# Patient Record
Sex: MALE | Race: WHITE | Employment: OTHER | URBAN - METROPOLITAN AREA
[De-identification: names, ages, dates, MRNs, and addresses within clinical notes are randomized per-mention and may not be internally consistent; named-entity substitution may affect disease eponyms.]

---

## 2017-01-23 ENCOUNTER — HOSPITAL ENCOUNTER (OUTPATIENT)
Dept: GENERAL RADIOLOGY | Age: 77
Discharge: HOME | End: 2017-01-23
Payer: MEDICARE

## 2017-01-23 DIAGNOSIS — Z90.5 S/P NEPHRECTOMY: ICD-10-CM

## 2017-01-23 PROCEDURE — 71020 XR CHEST PA LAT: CPT

## 2017-09-22 ENCOUNTER — CLINICAL SUPPORT (OUTPATIENT)
Dept: INTERNAL MEDICINE CLINIC | Age: 77
End: 2017-09-22

## 2017-09-22 DIAGNOSIS — Z23 ENCOUNTER FOR IMMUNIZATION: Primary | ICD-10-CM

## 2017-09-22 NOTE — PROGRESS NOTES
After obtaining consent, and per orders of Dr. Analy Burroughs, injection of Fluzone high dose 65+ given by [facility], LPN.  P

## 2017-10-03 ENCOUNTER — OFFICE VISIT (OUTPATIENT)
Dept: INTERNAL MEDICINE CLINIC | Age: 77
End: 2017-10-03

## 2017-10-03 VITALS
WEIGHT: 178 LBS | SYSTOLIC BLOOD PRESSURE: 120 MMHG | HEIGHT: 73 IN | BODY MASS INDEX: 23.59 KG/M2 | DIASTOLIC BLOOD PRESSURE: 72 MMHG | HEART RATE: 81 BPM | OXYGEN SATURATION: 97 %

## 2017-10-03 DIAGNOSIS — M79.642 PAIN OF LEFT HAND: Primary | ICD-10-CM

## 2017-10-03 PROBLEM — K21.9 GASTROESOPHAGEAL REFLUX DISEASE WITHOUT ESOPHAGITIS: Status: ACTIVE | Noted: 2017-10-03

## 2017-10-03 PROBLEM — E78.00 HYPERCHOLESTEROLEMIA: Status: ACTIVE | Noted: 2017-10-03

## 2017-10-03 PROBLEM — J30.9 ALLERGIC RHINITIS: Status: ACTIVE | Noted: 2017-10-03

## 2017-10-03 PROBLEM — J31.0 PURULENT RHINITIS: Status: ACTIVE | Noted: 2017-10-03

## 2017-10-03 PROBLEM — S33.5XXA LUMBAR SPRAIN: Status: ACTIVE | Noted: 2017-10-03

## 2017-10-03 PROBLEM — B02.29 HERPES ZOSTER VIRUS INFECTION OF FACE AND EAR NERVES: Status: ACTIVE | Noted: 2017-10-03

## 2017-10-03 PROBLEM — Z79.899 LONG-TERM USE OF HIGH-RISK MEDICATION: Status: ACTIVE | Noted: 2017-10-03

## 2017-10-03 PROBLEM — K29.80 DUODENITIS: Status: ACTIVE | Noted: 2017-10-03

## 2017-10-03 PROBLEM — C64.9 RENAL CELL CANCER (HCC): Status: ACTIVE | Noted: 2017-10-03

## 2017-10-03 PROBLEM — E55.9 VITAMIN D DEFICIENCY: Status: ACTIVE | Noted: 2017-10-03

## 2017-10-03 PROBLEM — N52.9 ED (ERECTILE DYSFUNCTION) OF ORGANIC ORIGIN: Status: ACTIVE | Noted: 2017-10-03

## 2017-10-03 PROBLEM — I82.409 RECURRENT DEEP VEIN THROMBOSIS (DVT) (HCC): Status: ACTIVE | Noted: 2017-10-03

## 2017-10-03 PROBLEM — C61 PROSTATIC CANCER (HCC): Status: ACTIVE | Noted: 2017-10-03

## 2017-10-03 PROBLEM — K63.5 COLON POLYPS: Status: ACTIVE | Noted: 2017-10-03

## 2017-10-03 PROBLEM — L30.9 ECZEMA: Status: ACTIVE | Noted: 2017-10-03

## 2017-10-03 PROBLEM — D68.51 FACTOR V LEIDEN MUTATION (HCC): Status: ACTIVE | Noted: 2017-10-03

## 2017-10-03 PROBLEM — M85.80 OSTEOPENIA: Status: ACTIVE | Noted: 2017-10-03

## 2017-10-03 PROBLEM — Z79.01 LONG-TERM (CURRENT) USE OF ANTICOAGULANTS: Status: ACTIVE | Noted: 2017-10-03

## 2017-10-03 PROBLEM — S92.912A TOE FRACTURE, LEFT: Status: ACTIVE | Noted: 2017-10-03

## 2017-10-03 RX ORDER — SILDENAFIL CITRATE 20 MG/1
20 TABLET ORAL AS NEEDED
COMMUNITY
End: 2020-05-01 | Stop reason: ALTCHOICE

## 2017-10-03 RX ORDER — DICLOFENAC SODIUM 10 MG/G
GEL TOPICAL 4 TIMES DAILY
COMMUNITY
End: 2018-08-02 | Stop reason: ALTCHOICE

## 2017-10-03 RX ORDER — CLOBETASOL PROPIONATE 0.5 MG/G
CREAM TOPICAL 2 TIMES DAILY
Qty: 15 G | Refills: 0 | Status: SHIPPED | OUTPATIENT
Start: 2017-10-03 | End: 2018-08-02 | Stop reason: ALTCHOICE

## 2017-10-03 NOTE — PATIENT INSTRUCTIONS
Hand Pain: Care Instructions  Your Care Instructions  Common causes of hand pain are overuse and injuries, such as might happen during sports or home repair projects. Everyday wear and tear, especially as you get older, also can cause hand pain. Most minor hand injuries will heal on their own, and home treatment is usually all you need to do. If you have sudden and severe pain, you may need tests and treatment. Follow-up care is a key part of your treatment and safety. Be sure to make and go to all appointments, and call your doctor if you are having problems. Its also a good idea to know your test results and keep a list of the medicines you take. How can you care for yourself at home? · Take pain medicines exactly as directed. ¨ If the doctor gave you a prescription medicine for pain, take it as prescribed. ¨ If you are not taking a prescription pain medicine, ask your doctor if you can take an over-the-counter medicine. · Rest and protect your hand. Take a break from any activity that may cause pain. · Put ice or a cold pack on your hand for 10 to 20 minutes at a time. Put a thin cloth between the ice and your skin. · Prop up the sore hand on a pillow when you ice it or anytime you sit or lie down during the next 3 days. Try to keep it above the level of your heart. This will help reduce swelling. · If your doctor recommends a sling, splint, or elastic bandage to support your hand, wear it as directed. When should you call for help? Call 911 anytime you think you may need emergency care. For example, call if:  · Your hand turns cool or pale or changes color. Call your doctor now or seek immediate medical care if:  · You cannot move your hand. · Your hand pops, moves out of its normal position, and then returns to its normal position. · You have signs of infection, such as:  ¨ Increased pain, swelling, warmth, or redness. ¨ Red streaks leading from the sore area.   ¨ Pus draining from a place on your hand. ¨ A fever. · Your hand feels numb or tingly. Watch closely for changes in your health, and be sure to contact your doctor if:  · Your hand feels unstable when you try to use it. · You do not get better as expected. · You have any new symptoms, such as swelling. · Bruises from an injury to your hand last longer than 2 weeks. Where can you learn more? Go to http://kenna-lisa.info/. Enter R273 in the search box to learn more about \"Hand Pain: Care Instructions. \"  Current as of: March 20, 2017  Content Version: 11.3  © 4171-7534 [employer]. Care instructions adapted under license by [employer] (which disclaims liability or warranty for this information). If you have questions about a medical condition or this instruction, always ask your healthcare professional. [address] any warranty or liability for your use of this information.

## 2017-10-03 NOTE — MR AVS SNAPSHOT
Visit Information Date & Time Provider Department Dept. Phone Encounter #  
 10/3/2017  1:50 PM Ana Lilia Orellana MD [facility] [id number] Follow-up Instructions Return if symptoms worsen or fail to improve. Your Appointments 1/4/2018 10:00 AM  
FOLLOW UP 10 with Ana Lilia Orellana MD  
Corazon Prieto ([facility]) Appt Note: 6 mo fu  
 [address] [id number] [address] [facility] [id number] Upcoming Health Maintenance Date Due DTaP/Tdap/Td series (1 - Tdap) 9/5/1961 ZOSTER VACCINE AGE 60> 7/5/2000 GLAUCOMA SCREENING Q2Y 9/5/2005 Pneumococcal 65+ High/Highest Risk (1 of 2 - PCV13) 9/5/2005 MEDICARE YEARLY EXAM 9/5/2005 Allergies as of 10/3/2017  Review Complete On: 10/3/2017 By: Ana Lilia Orellana MD  
  
 Severity Noted Reaction Type Reactions Quinolones  10/03/2017    Unknown (comments) Current Immunizations  Never Reviewed Name Date Influenza High Dose Vaccine PF 9/22/2017 Influenza Vaccine 10/26/2016, 10/19/2015, 9/25/2014 Pneumococcal Conjugate (PCV-13) 10/19/2015, 12/1/2005 Not reviewed this visit You Were Diagnosed With   
  
 Codes Comments Pain of left hand    -  Primary ICD-10-CM: F58.868 ICD-9-CM: 729.5 Vitals BP Pulse Height(growth percentile) Weight(growth percentile) SpO2 BMI  
 120/72 (BP 1 Location: Right arm, BP Patient Position: Sitting) 81 6' 1\" (1.854 m) 178 lb (80.7 kg) 97% 23.48 kg/m2 Smoking Status Never Smoker BMI and BSA Data Body Mass Index Body Surface Area  
 23.48 kg/m 2 2.04 m 2 Preferred Pharmacy Pharmacy Name Phone [facility] [address], [address] [place] [address] [phone] Your Updated Medication List  
  
   
 This list is accurate as of: 10/3/17  2:02 PM.  Always use your most recent med list.  
  
  
  
  
 BONIVA 150 mg tablet Generic drug:  ibandronate Take 150 mg by mouth every thirty (30) days. CALCIUM 600 + D 600-125 mg-unit Tab Generic drug:  calcium-cholecalciferol (d3) Take 1 tablet by mouth daily. calcium carbonate 200 mg calcium (500 mg) Chew Commonly known as:  TUMS Take 2 tablets by mouth daily. Cholecalciferol (Vitamin D3) 3,000 unit Tab Take 1 tablet by mouth daily. clobetasol 0.05 % topical cream  
Commonly known as:  Deidra Patee Apply  to affected area two (2) times a day. diphenhydrAMINE 25 mg capsule Commonly known as:  BENADRYL Take 25 mg by mouth every six (6) hours as needed for Itching or Sleep (allergies). pravastatin 10 mg tablet Commonly known as:  PRAVACHOL Take 40 mg by mouth nightly. Indications: HYPERCHOLESTEROLEMIA  
  
 sildenafil 20 mg tablet Commonly known as:  REVATIO Take 20 mg by mouth three (3) times daily. TYLENOL EXTRA STRENGTH 500 mg tablet Generic drug:  acetaminophen Take 1,000 mg by mouth every six (6) hours as needed for Pain. VOLTAREN 1 % Gel Generic drug:  diclofenac Apply  to affected area four (4) times daily. XARELTO 20 mg Tab tablet Generic drug:  rivaroxaban Take 20 mg by mouth daily. Prescriptions Sent to Pharmacy Refills  
 clobetasol (TEMOVATE) 0.05 % topical cream 0 Sig: Apply  to affected area two (2) times a day. Class: Normal  
 Pharmacy: [address], [address] [place] [address]  [address]  #: [phone] Route: Topical  
  
Follow-up Instructions Return if symptoms worsen or fail to improve. Introducing [facility]! Romayne Duster introduces [employer] patient portal. Now you can access parts of your medical record, email your doctor's office, and request medication refills online. 1. In your internet browser, go to [email or web address] 2. Click on the First Time User? Click Here link in the Sign In box. You will see the New Member Sign Up page. 3. Enter your [employer] Access Code exactly as it appears below. You will not need to use this code after youve completed the sign-up process. If you do not sign up before the expiration date, you must request a new code. · [employer] Access Code: [email or web address] Expires: 12/21/2017  9:19 AM 
 
4. Enter the last four digits of your Social Security Number (xxxx) and Date of Birth (mm/dd/yyyy) as indicated and click Submit. You will be taken to the next sign-up page. 5. Create a Teliportmet ID. This will be your [employer] login ID and cannot be changed, so think of one that is secure and easy to remember. 6. Create a [employer] password. You can change your password at any time. 7. Enter your Password Reset Question and Answer. This can be used at a later time if you forget your password. 8. Enter your e-mail address. You will receive e-mail notification when new information is available in [address] 9. Click Sign Up. You can now view and download portions of your medical record. 10. Click the Download Summary menu link to download a portable copy of your medical information. If you have questions, please visit the Frequently Asked Questions section of the [employer] website. Remember, [employer] is NOT to be used for urgent needs. For medical emergencies, dial 911. Now available from your iPhone and Android! Please provide this summary of care documentation to your next provider. Your primary care clinician is listed as [address]. If you have any questions after today's visit, please call [phone].

## 2017-10-03 NOTE — PROGRESS NOTES
This note will not be viewable in [address] Subjective:   Mr. Jonnie Ruiz presents to the office today with complaints of right hand pain and swelling. He has noticed this over the last week after he had playing a round of golf. Patient states that he feels as though he was gripping the golf club to tightly. Since then he has noted the palm of his hand swelling and a round well circumcised rash present. He notes that it is slightly itchy.   He denies any hand pain or wrist pain or no reduction in the range of motion of his  or wrist movement      Past Medical History:   Diagnosis Date    Allergic rhinitis 10/3/2017    Arthritis     Cancer ([address]     Prostate    Cancer ([address]     kidney    Chronic kidney disease     Coagulation disorder ([address]     factor V leiden mutation- tends to have blood clots-     Colon polyps 10/3/2017    Duodenitis 10/3/2017    Eczema 10/3/2017    ED (erectile dysfunction) of organic origin 10/3/2017    Factor V Leiden mutation ([address] 10/3/2017    Gastroesophageal reflux disease without esophagitis 10/3/2017    Herpes zoster virus infection of face and ear nerves 10/3/2017    Hypercholesterolemia 10/3/2017    Long-term (current) use of anticoagulants 10/3/2017    Long-term use of high-risk medication 10/3/2017    Lumbar sprain 10/3/2017    Osteopenia 10/3/2017    Other ill-defined conditions(799.89)     High Cholesterol    Other ill-defined conditions(799.89)     doctor does not want him to have quinolones family -causes achilles tendonitis    Prostatic cancer ([address] 10/3/2017    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/2017    Recurrent deep vein thrombosis (DVT) ([address] 10/3/2017    Renal cell cancer ([address] 10/3/2017    Thromboembolus ([address]     DVT    Toe fracture, left 10/3/2017    Vitamin D deficiency 10/3/2017     Past Surgical History:   Procedure Laterality Date    COLORECTAL SCRN; HI RISK IND  2/3/2015         HX ORTHOPAEDIC      cortisone shot in knee and elbow    HX OTHER SURGICAL      colonoscopy    HX TONSILLECTOMY      HX UROLOGICAL Right     Kidney Removal     Allergies   Allergen Reactions    Quinolones Unknown (comments)     Current Outpatient Prescriptions   Medication Sig Dispense Refill    sildenafil (REVATIO) 20 mg tablet Take 20 mg by mouth three (3) times daily.  diclofenac (VOLTAREN) 1 % gel Apply  to affected area four (4) times daily.  clobetasol (TEMOVATE) 0.05 % topical cream Apply  to affected area two (2) times a day. 15 g 0    Cholecalciferol, Vitamin D3, 3,000 unit tab Take 1 tablet by mouth daily.  calcium carbonate (TUMS) 200 mg calcium (500 mg) chew Take 2 tablets by mouth daily.  calcium-cholecalciferol, d3, (CALCIUM 600 + D) 600-125 mg-unit tab Take 1 tablet by mouth daily.  ibandronate (BONIVA) 150 mg tablet Take 150 mg by mouth every thirty (30) days.  acetaminophen (TYLENOL EXTRA STRENGTH) 500 mg tablet Take 1,000 mg by mouth every six (6) hours as needed for Pain.  diphenhydrAMINE (BENADRYL) 25 mg capsule Take 25 mg by mouth every six (6) hours as needed for Itching or Sleep (allergies).  rivaroxaban (XARELTO) 20 mg tab tablet Take 20 mg by mouth daily.  pravastatin (PRAVACHOL) 10 mg tablet Take 40 mg by mouth nightly.  Indications: HYPERCHOLESTEROLEMIA       Social History     Social History    Marital status:      Spouse name: N/A    Number of children: N/A    Years of education: N/A     Social History Main Topics    Smoking status: Never Smoker    Smokeless tobacco: Never Used    Alcohol use No    Drug use: No    Sexual activity: Not Asked     Other Topics Concern    None     Social History Narrative     Family History   Problem Relation Age of Onset    Hypertension Mother     Other Mother      abdominal aneurysm    Cancer Father      bladder        Review of Systems:  GEN: no weight loss, weight gain, fatigue or night sweats  EXT: Complains of right hand pain and redness  Neurological ROS: no TIA or stroke symptoms  ROS otherwise negative      Objective:     Visit Vitals    /72 (BP 1 Location: Right arm, BP Patient Position: Sitting)    Pulse 81    Ht 6' 1\" (1.854 m)    Wt 178 lb (80.7 kg)    SpO2 97%    BMI 23.48 kg/m2     Body mass index is 23.48 kg/(m^2). General:   alert, cooperative and no distress   Extremities: , Right hand is slightly swollen there is a round area of redness in the palm of the hand which approximates his hand as it would  his golf club which is red with well demarcated edge. No blister formation is noted. There is no tenderness to the hand with palpation no dorsal hand swelling is present and no wrist pain or swelling is evident   Neuro: ..alert, oriented x3,speech normal in context and clarity, cranial nerves II-XII intact,motor strength: full proximally and distally,gait: normal  reflexes: full and symmetric     Physical exam otherwise negative         Assessment/Plan:     Diagnoses and all orders for this visit:    Pain of left hand    Other orders  -     clobetasol (TEMOVATE) 0.05 % topical cream; Apply  to affected area two (2) times a day., Normal, Disp-15 g, R-0        Other instructions: The area of redness on the palm of the hand is somewhat interesting as it appears as either an allergic reaction or a burn. Patient notes that it is slightly itchy and we will use clobetasol cream to see if this will affect some improvement. The patient will avoid golf for the time being and was recommended that he loosen his golf  when planned in the future. Follow-up if there is no improvement    Follow-up Disposition:  Return if symptoms worsen or fail to improve.     Sam Cruz MD

## 2017-10-03 NOTE — PROGRESS NOTES
Liat Millan is a 68 y.o. male presenting for Hand Swelling  . 1. Have you been to the ER, urgent care clinic since your last visit? Hospitalized since your last visit? No    2. Have you seen or consulted any other health care providers outside of the [address] [place] [address] since your last visit? Include any pap smears or colon screening. No    Fall Risk Assessment, last 12 mths 10/3/2017   Able to walk? Yes   Fall in past 12 months? No         Abuse Screening Questionnaire 10/3/2017   Do you ever feel afraid of your partner? N   Are you in a relationship with someone who physically or mentally threatens you? N   Is it safe for you to go home? Y       PHQ over the last two weeks 10/3/2017   Little interest or pleasure in doing things Not at all   Feeling down, depressed or hopeless Not at all   Total Score PHQ 2 0       There are no discontinued medications.

## 2018-01-16 RX ORDER — RIVAROXABAN 20 MG/1
TABLET, FILM COATED ORAL
Qty: 30 TAB | Refills: 5 | Status: SHIPPED | OUTPATIENT
Start: 2018-01-16 | End: 2018-08-07 | Stop reason: SDUPTHER

## 2018-01-22 ENCOUNTER — OFFICE VISIT (OUTPATIENT)
Dept: INTERNAL MEDICINE CLINIC | Age: 78
End: 2018-01-22

## 2018-01-22 VITALS
WEIGHT: 181.6 LBS | BODY MASS INDEX: 24.07 KG/M2 | HEART RATE: 70 BPM | OXYGEN SATURATION: 96 % | DIASTOLIC BLOOD PRESSURE: 72 MMHG | HEIGHT: 73 IN | SYSTOLIC BLOOD PRESSURE: 120 MMHG

## 2018-01-22 DIAGNOSIS — C61 PROSTATIC CANCER (HCC): ICD-10-CM

## 2018-01-22 DIAGNOSIS — D68.51 FACTOR V LEIDEN MUTATION (HCC): ICD-10-CM

## 2018-01-22 DIAGNOSIS — E78.00 HYPERCHOLESTEROLEMIA: Primary | ICD-10-CM

## 2018-01-22 DIAGNOSIS — E55.9 VITAMIN D DEFICIENCY: ICD-10-CM

## 2018-01-22 DIAGNOSIS — M85.80 OSTEOPENIA, UNSPECIFIED LOCATION: ICD-10-CM

## 2018-01-22 DIAGNOSIS — Z79.899 LONG-TERM USE OF HIGH-RISK MEDICATION: ICD-10-CM

## 2018-01-22 DIAGNOSIS — Z79.01 LONG-TERM (CURRENT) USE OF ANTICOAGULANTS: ICD-10-CM

## 2018-01-22 DIAGNOSIS — C64.9 RENAL CELL CARCINOMA, UNSPECIFIED LATERALITY (HCC): ICD-10-CM

## 2018-01-22 LAB
ALBUMIN SERPL-MCNC: 4.4 G/DL (ref 3.9–5.4)
ALKALINE PHOS POC: 53 U/L (ref 38–126)
ALT SERPL-CCNC: 22 U/L (ref 9–52)
AST SERPL-CCNC: 22 U/L (ref 14–36)
BACTERIA UA POCT, BACTPOCT: NORMAL
BILIRUB UR QL STRIP: NEGATIVE
BUN BLD-MCNC: 20 MG/DL (ref 9–20)
CALCIUM BLD-MCNC: 10 MG/DL (ref 8.4–10.2)
CASTS UA POCT: 0
CHLORIDE BLD-SCNC: 101 MMOL/L (ref 98–107)
CHOLEST SERPL-MCNC: 156 MG/DL (ref 0–200)
CK (CPK) POC: 83 U/L (ref 30–135)
CLUE CELLS, CLUEPOCT: NEGATIVE
CO2 POC: 31 MMOL/L (ref 22–32)
CREAT BLD-MCNC: 1 MG/DL (ref 0.8–1.5)
CRYSTALS UA POCT, CRYSPOCT: NEGATIVE
EGFR (POC): 72.3
EPITHELIAL CELLS POCT: NEGATIVE
GLUCOSE POC: 75 MG/DL (ref 75–110)
GLUCOSE UR-MCNC: NEGATIVE MG/DL
GRAN# POC: 3.6 K/UL (ref 2–7.8)
GRAN% POC: 64 % (ref 37–92)
HCT VFR BLD CALC: 45.1 % (ref 37–51)
HDLC SERPL-MCNC: 68 MG/DL (ref 35–130)
HGB BLD-MCNC: 14.6 G/DL (ref 12–18)
KETONES P FAST UR STRIP-MCNC: NEGATIVE MG/DL
LDL CHOLESTEROL POC: 65 MG/DL (ref 0–130)
LY# POC: 1.7 K/UL (ref 0.6–4.1)
LY% POC: 30.3 % (ref 10–58.5)
MCH RBC QN: 28.7 PG (ref 26–32)
MCHC RBC-ENTMCNC: 32.5 G/DL (ref 30–36)
MCV RBC: 88 FL (ref 80–97)
MID #, POC: 0.3 K/UL (ref 0–1.8)
MID% POC: 5.7 % (ref 0.1–24)
MUCUS UA POCT, MUCPOCT: NORMAL
PH UR STRIP: 6.5 [PH] (ref 5–7)
PLATELET # BLD: 149 K/UL (ref 140–440)
POTASSIUM SERPL-SCNC: 4.5 MMOL/L (ref 3.6–5)
PROT SERPL-MCNC: 7.2 G/DL (ref 6.3–8.2)
PROT UR QL STRIP: NEGATIVE
RBC # BLD: 5.1 M/UL (ref 4.2–6.3)
RBC UA POCT, RBCPOCT: 0
SODIUM SERPL-SCNC: 143 MMOL/L (ref 137–145)
SP GR UR STRIP: 1.01 (ref 1.01–1.02)
TCHOL/HDL RATIO (POC): 2.3 (ref 0–4)
TOTAL BILIRUBIN POC: 1 MG/DL (ref 0.2–1.3)
TRICH UA POCT, TRICHPOC: NEGATIVE
TRIGL SERPL-MCNC: 115 MG/DL (ref 0–200)
TSH BLD-ACNC: 2.1 UIU/ML (ref 0.4–4.2)
UA UROBILINOGEN AMB POC: NORMAL (ref 0.2–1)
URINALYSIS CLARITY POC: CLEAR
URINALYSIS COLOR POC: NORMAL
URINE BLOOD POC: NEGATIVE
URINE CULT COMMENT, POCT: NORMAL
URINE LEUKOCYTES POC: NEGATIVE
URINE NITRITES POC: NEGATIVE
VITAMIN D POC: 28.4 NG/ML (ref 30–96)
VLDLC SERPL CALC-MCNC: 23 MG/DL
WBC # BLD: 5.6 K/UL (ref 4.1–10.9)
WBC UA POCT, WBCPOCT: 0
YEAST UA POCT, YEASTPOC: NEGATIVE

## 2018-01-22 NOTE — PROGRESS NOTES
Tamara Worley is a 68 y.o. male presenting for Cholesterol Problem (6 mo fu)  . 1. Have you been to the ER, urgent care clinic since your last visit? Hospitalized since your last visit? No    2. Have you seen or consulted any other health care providers outside of the [address] [place] [address] since your last visit? Include any pap smears or colon screening. No    Fall Risk Assessment, last 12 mths 10/3/2017   Able to walk? Yes   Fall in past 12 months? No         Abuse Screening Questionnaire 10/3/2017   Do you ever feel afraid of your partner? N   Are you in a relationship with someone who physically or mentally threatens you? N   Is it safe for you to go home? Y       PHQ over the last two weeks 10/3/2017   Little interest or pleasure in doing things Not at all   Feeling down, depressed or hopeless Not at all   Total Score PHQ 2 0       There are no discontinued medications.

## 2018-01-22 NOTE — PROGRESS NOTES
This note will not be viewable in [address] Jaimee Adams is a 68 y.o. male and presents with Cholesterol Problem (6 mo fu)  . Subjective:  Mr. Omar Beckman returns to our office today in follow-up of multiple medical problems. The patient has hypercholesterolemia and remains on pravastatin therapy. The patient denies muscle soreness or GI upset. He has no history of ASCVD and denies exertional chest pains or claudication. The  the patient has factor V Leiden deficiency with recurrent DVT. He is currently on Xarelto long-term. Patient has had no bleeding problems and no recurrent clot issues. He is tolerated his treatment well. Patient has osteopenia and remains on Boniva as well as calcium and vitamin D. He has been vitamin D deficient in the past as well. He denies any esophageal problems related to his Boniva and he has had no falls or fractures in the last 6 months. The patient does have a history of prostate cancer and renal cell cancer. He is followed by Dr. Noemi Brown for this and sees him on a yearly basis. He has had no recurrence of his disease.     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    Factor V Leiden mutation ([facility] 10/3/2017    Gastroesophageal reflux disease without esophagitis 10/3/2017    Herpes zoster virus infection of face and ear nerves 10/3/2017    Hypercholesterolemia 10/3/2017    Long-term (current) use of anticoagulants 10/3/2017    Long-term use of high-risk medication 10/3/2017    Lumbar sprain 10/3/2017    Osteopenia 10/3/2017    Other ill-defined conditions(799.89)     High Cholesterol    Other ill-defined conditions(799.89)     doctor does not want him to have quinolones family -causes achilles tendonitis    Prostatic cancer ([facility] 10/3/2017    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/2017    Recurrent deep vein thrombosis (DVT) [facility] 10/3/2017    Renal cell cancer ([facility] 10/3/2017    Thromboembolus ([facility]     DVT    Toe fracture, left 10/3/2017    Vitamin D deficiency 10/3/2017     Past Surgical History:   Procedure Laterality Date    COLORECTAL SCRN; HI RISK IND  2/3/2015         HX ORTHOPAEDIC      cortisone shot in knee and elbow    HX OTHER SURGICAL      colonoscopy    HX TONSILLECTOMY      HX UROLOGICAL Right     Kidney Removal     Allergies   Allergen Reactions    Quinolones Unknown (comments)     Current Outpatient Prescriptions   Medication Sig Dispense Refill    XARELTO 20 mg tab tablet TAKE ONE TABLET BY MOUTH DAILY 30 Tab 5    sildenafil (REVATIO) 20 mg tablet Take 20 mg by mouth three (3) times daily.  diclofenac (VOLTAREN) 1 % gel Apply  to affected area four (4) times daily.  clobetasol (TEMOVATE) 0.05 % topical cream Apply  to affected area two (2) times a day. 15 g 0    Cholecalciferol, Vitamin D3, 3,000 unit tab Take 1 tablet by mouth daily.  calcium carbonate (TUMS) 200 mg calcium (500 mg) chew Take 2 tablets by mouth daily.  calcium-cholecalciferol, d3, (CALCIUM 600 + D) 600-125 mg-unit tab Take 1 tablet by mouth daily.  ibandronate (BONIVA) 150 mg tablet Take 150 mg by mouth every thirty (30) days.  acetaminophen (TYLENOL EXTRA STRENGTH) 500 mg tablet Take 1,000 mg by mouth every six (6) hours as needed for Pain.  diphenhydrAMINE (BENADRYL) 25 mg capsule Take 25 mg by mouth every six (6) hours as needed for Itching or Sleep (allergies).  pravastatin (PRAVACHOL) 10 mg tablet Take 40 mg by mouth nightly.  Indications: HYPERCHOLESTEROLEMIA       Social History     Social History    Marital status:      Spouse name: N/A    Number of children: N/A    Years of education: N/A     Social History Main Topics    Smoking status: Never Smoker    Smokeless tobacco: Never Used    Alcohol use No    Drug use: No    Sexual activity: Not Asked     Other Topics Concern    None     Social History Narrative     Family History   Problem Relation Age of Onset    Hypertension Mother     Other Mother      abdominal aneurysm    Cancer Father      bladder        Health Maintenance   Topic Date Due    DTaP/Tdap/Td series (1 - Tdap) 09/05/1961    ZOSTER VACCINE AGE 60>  07/05/2000    GLAUCOMA SCREENING Q2Y  09/05/2005    MEDICARE YEARLY EXAM  09/05/2005    Pneumococcal 65+ High/Highest Risk (2 of 2 - PPSV23) 12/14/2015    Influenza Age 9 to Adult  Completed        Review of Systems  Constitutional: negative for fevers, chills, anorexia and weight loss  Eyes:   negative for visual disturbance and irritation  ENT:   negative for tinnitus,sore throat,nasal congestion,ear pain,hoarseness  Respiratory:  negative for cough, hemoptysis, dyspnea,wheezing  CV:   negative for chest pain, palpitations, lower extremity edema  GI:   negative for nausea, vomiting, diarrhea, abdominal pain,melena  Endo:               negative for polyuria,polydipsia,polyphagia,heat intolerance  Genitourinary: negative for frequency, dysuria and hematuria  Integumentary: negative for rash and pruritus  Hematologic:  negative for easy bruising and gum/nose bleeding  Musculoskel: negative for myalgias, arthralgias, back pain, muscle weakness, joint pain  Neurological:  negative for headaches, dizziness, vertigo, memory problems and gait   Behavl/Psych: negative for feelings of anxiety, depression, mood changes  ROS otherwise negative      Objective:  Visit Vitals    /72 (BP 1 Location: Left arm, BP Patient Position: Sitting)    Pulse 70    Ht 6' 1\" (1.854 m)    Wt 181 lb 9.6 oz (82.4 kg)    SpO2 96%    BMI 23.96 kg/m2     Body mass index is 23.96 kg/(m^2).     Physical Exam:   General appearance - alert, well appearing, and in no distress  Mental status - alert, oriented to person, place, and time  EYE-BLAS, EOMI,conjunctiva normal bilaterally, lids normal  ENT-ENT exam normal, no neck nodes or sinus tenderness  Nose - normal and patent, no erythema,  Or discharge   Mouth - mucous membranes moist, pharynx normal without lesions  Neck - supple, no significant adenopathy or bruit  Chest - clear to auscultation, no wheezes, rales or rhonchi. Heart - normal rate, regular rhythm, normal S1, S2, no murmurs, rubs, clicks or gallops   Abdomen - soft, nontender, nondistended, no masses or organomegaly  Lymph- no adenopathy palpable  Ext-peripheral pulses normal, no pedal edema, no clubbing or cyanosis  Skin-Warm and dry. no hyperpigmentation, vitiligo, or suspicious lesions  Neuro -alert, oriented, normal speech, no focal findings or movement disorder noted      Assessment/Plan:  Diagnoses and all orders for this visit:    Hypercholesterolemia  -     AMB POC LIPID PROFILE  -     IN COLLECTION VENOUS BLOOD,VENIPUNCTURE  -     AMB POC TSH    Long-term use of high-risk medication  -     AMB POC COMPLETE CBC,AUTOMATED ENTER  -     AMB POC COMPREHENSIVE METABOLIC PANEL  -     AMB POC CK (CPK)  -     AMB POC URINALYSIS DIP STICK AUTO W/ MICRO     Factor V Leiden mutation ([facility]    Long-term (current) use of anticoagulants    Osteopenia, unspecified location    Vitamin D deficiency  -     AMB POC VITAMIN D    Prostatic cancer ([facility]    Renal cell carcinoma, unspecified laterality ([facility]        Other instructions: The patient's medications are reviewed and reconciled. No change in his current medical regimen is made. A prudent diet and exercise regimen is encouraged. Await results of multiple labs. Follow-up 6 months    Follow-up Disposition:  Return in about 6 months (around 7/22/2018). I have reviewed with the patient details of the assessment and plan and all questions were answered.  Relevent patient education was performed. The most recent lab findings were reviewed with the patient. An After Visit Summary was printed and given to the patient.     Charlie Antunez MD

## 2018-01-22 NOTE — PATIENT INSTRUCTIONS
Clotting Factor Deficiencies: Care Instructions  Your Care Instructions    Clotting factors are substances in the blood that help stop bleeding after a cut or injury. They also prevent sudden bleeding. In people who have clotting factor problems, the clotting factors don't work right or, in some cases, are missing. When blood does not clot well, even minor injuries can cause serious bleeding. This can lead to blood loss, injury to internal organs, or damage to muscles or joints. Several conditions, including hemophilia, can make it hard for the blood to clot. Your doctor can treat you by giving you replacement clotting factors. You also may take medicine to prevent bleeding. You may often have clotting factors transfused into a vein to prevent bleeding, or you may get them as needed. You may eventually learn to do this at home. You can also try to prevent injuries that can cause you to bleed. Follow-up care is a key part of your treatment and safety. Be sure to make and go to all appointments, and call your doctor if you are having problems. It's also a good idea to know your test results and keep a list of the medicines you take. How can you care for yourself at home? · Take your medicines exactly as prescribed. Call your doctor if you think you are having a problem with your medicine. You will get more details on the specific medicines your doctor prescribes. · Stay at a healthy body weight. If you are overweight, the additional stress on joints can trigger bleeding. · Exercise safely. Avoid contact sports. Swim or walk to avoid excess pressure on your joints. Check with your doctor before doing activities that put you at high risk for falls, such as riding a bike. · Brush and floss your teeth daily. This may help you avoid problems that could lead to having a tooth pulled. · Avoid aspirin and nonsteroidal anti-inflammatory drugs (NSAIDs), such as ibuprofen (Advil, Motrin) or naproxen (Aleve).  They can increase the chance of bleeding. · Take pain medicines exactly as directed. ¨ If the doctor gave you a prescription medicine for pain, take it as prescribed. ¨ If you are not taking a prescription pain medicine, ask your doctor if you can take an over-the-counter medicine. · Take care to prevent accidents at home:  ¨ Make sure rugs are tacked down so you do not slip. ¨ Keep furniture with sharp edges out of pathways. ¨ Use nonskid floor wax. ¨ Wipe up spills quickly. ¨ If you live in an area that gets snow and ice in the winter, sprinkle salt on steps and sidewalks. ¨ Avoid loose-fitting shoes. You might lose your balance and fall. · Wear medical alert jewelry that lists your clotting problem. You can buy this at most drugstores. When should you call for help? Call 911 anytime you think you may need emergency care. For example, call if:  ? · You passed out (lost consciousness). ? · You have signs of severe bleeding, which includes:  ¨ You have a severe headache that is different from past headaches. ¨ You vomit blood or what looks like coffee grounds. ¨ Your stools are maroon or very bloody. ?Call your doctor now or seek immediate medical care if:  ? · You are dizzy or lightheaded, or you feel like you may faint. ? · You have abnormal bleeding, such as:  ¨ Your stools are black and look like tar, or they have streaks of blood. ¨ You have blood in your urine. ¨ You have joint pain. ¨ You have bruises or blood spots under your skin. ? Watch closely for changes in your health, and be sure to contact your doctor if:  ? · You do not get better as expected. Where can you learn more? Go to http://kenna-lisa.info/. Enter O104 in the search box to learn more about \"Clotting Factor Deficiencies: Care Instructions. \"  Current as of: October 13, 2016  Content Version: 11.4  © 3949-9915 [employer].  Care instructions adapted under license by [employer] (which disclaims liability or warranty for this information). If you have questions about a medical condition or this instruction, always ask your healthcare professional. [address] any warranty or liability for your use of this information.

## 2018-01-26 ENCOUNTER — TELEPHONE (OUTPATIENT)
Dept: INTERNAL MEDICINE CLINIC | Age: 78
End: 2018-01-26

## 2018-01-26 RX ORDER — ERGOCALCIFEROL 1.25 MG/1
50000 CAPSULE ORAL
COMMUNITY
End: 2020-05-04 | Stop reason: SDUPTHER

## 2018-01-26 NOTE — TELEPHONE ENCOUNTER
Patient sent a note with some changes to medications. Changes were reconciled in the chart per Dr. Manasa Rodriguez.

## 2018-02-02 ENCOUNTER — OFFICE VISIT (OUTPATIENT)
Dept: INTERNAL MEDICINE CLINIC | Age: 78
End: 2018-02-02

## 2018-02-02 VITALS
DIASTOLIC BLOOD PRESSURE: 70 MMHG | WEIGHT: 184 LBS | BODY MASS INDEX: 24.39 KG/M2 | SYSTOLIC BLOOD PRESSURE: 122 MMHG | OXYGEN SATURATION: 98 % | HEIGHT: 73 IN | HEART RATE: 62 BPM

## 2018-02-02 DIAGNOSIS — H11.32 SUBCONJUNCTIVAL HEMORRHAGE OF LEFT EYE: ICD-10-CM

## 2018-02-02 DIAGNOSIS — J02.9 ACUTE PHARYNGITIS, UNSPECIFIED ETIOLOGY: Primary | ICD-10-CM

## 2018-02-02 RX ORDER — AZITHROMYCIN 250 MG/1
250 TABLET, FILM COATED ORAL SEE ADMIN INSTRUCTIONS
Qty: 6 TAB | Refills: 0 | Status: SHIPPED | OUTPATIENT
Start: 2018-02-02 | End: 2018-05-09 | Stop reason: ALTCHOICE

## 2018-02-02 NOTE — MR AVS SNAPSHOT
[facility] 
 
 
 [address] [id number] [phone] Patient: Brook Main MRN: [id number] [facility]:[birth year] Visit Information Date & Time Provider Department Dept. Phone Encounter #  
 2/2/2018 10:50 AM Maribell Kent MD [facility] [id number] Follow-up Instructions Return if symptoms worsen or fail to improve. Your Appointments 8/2/2018 10:00 AM  
FOLLOW UP 10 with Maribell Kent MD  
[facility] ([address] [facility]) Appt Note: 6 mo fu; r/s:6 mo fu  
 [address] [id number] [address] [address] Upcoming Health Maintenance Date Due DTaP/Tdap/Td series (1 - Tdap) 9/5/1961 ZOSTER VACCINE AGE 60> 7/5/2000 GLAUCOMA SCREENING Q2Y 9/5/2005 MEDICARE YEARLY EXAM 9/5/2005 Allergies as of 2/2/2018  Review Complete On: 2/2/2018 By: Maribell Kent MD  
  
 Severity Noted Reaction Type Reactions Quinolones  10/03/2017    Unknown (comments) Current Immunizations  Reviewed on 1/22/2018 Name Date Influenza High Dose Vaccine PF 9/22/2017 Influenza Vaccine 10/26/2016, 10/19/2015, 9/25/2014 Pneumococcal Conjugate (PCV-13) 10/19/2015 Pneumococcal Polysaccharide (PPSV-23) 12/1/2005 Not reviewed this visit You Were Diagnosed With   
  
 Codes Comments Acute pharyngitis, unspecified etiology    -  Primary ICD-10-CM: J02.9 ICD-9-CM: 275 Subconjunctival hemorrhage of left eye     ICD-10-CM: H11.32 
ICD-9-CM: 372.72 Vitals BP Pulse Height(growth percentile) Weight(growth percentile) SpO2 BMI  
 122/70 (BP 1 Location: Left arm, BP Patient Position: Sitting) 62 6' 1\" (1.854 m) 184 lb (83.5 kg) 98% 24.28 kg/m2 Smoking Status Never Smoker BMI and BSA Data Body Mass Index Body Surface Area  
 24.28 kg/m 2 2.07 m 2 Preferred Pharmacy Pharmacy Name Phone [address], [address] [facility] [address] [place] [phone] Your Updated Medication List  
  
   
This list is accurate as of: 2/2/18 11:19 AM.  Always use your most recent med list.  
  
  
  
  
 azithromycin 250 mg tablet Commonly known as:  Kendall Miguel Take 1 Tab by mouth See Admin Instructions. BONIVA 150 mg tablet Generic drug:  ibandronate Take 150 mg by mouth every thirty (30) days. CALCIUM 600 + D 600-125 mg-unit Tab Generic drug:  calcium-cholecalciferol (d3) Take 1 tablet by mouth daily. calcium carbonate 200 mg calcium (500 mg) Chew Commonly known as:  TUMS Take 2 tablets by mouth daily. Cholecalciferol (Vitamin D3) 3,000 unit Tab Take 1 tablet by mouth daily. clobetasol 0.05 % topical cream  
Commonly known as:  Jennifer Seashore Apply  to affected area two (2) times a day. diphenhydrAMINE 25 mg capsule Commonly known as:  BENADRYL Take 25 mg by mouth every six (6) hours as needed for Itching or Sleep (allergies). ergocalciferol 50,000 unit capsule Commonly known as:  ERGOCALCIFEROL Take 50,000 Units by mouth. Take one capsule on the 1st and 15th of every month  
  
 pravastatin 40 mg tablet Commonly known as:  PRAVACHOL  
TAKE ONE TABLET BY MOUTH DAILY  
  
 sildenafil (antihypertensive) 20 mg tablet Commonly known as:  REVATIO Take 20 mg by mouth as needed. TYLENOL EXTRA STRENGTH 500 mg tablet Generic drug:  acetaminophen Take 1,000 mg by mouth every six (6) hours as needed for Pain. VOLTAREN 1 % Gel Generic drug:  diclofenac Apply  to affected area four (4) times daily. XARELTO 20 mg Tab tablet Generic drug:  rivaroxaban TAKE ONE TABLET BY MOUTH DAILY Prescriptions Sent to Pharmacy  Refills  
 azithromycin (ZITHROMAX) 250 mg tablet 0  
 Sig: Take 1 Tab by mouth See Admin Instructions. Class: Normal  
 Pharmacy: [facility] [address] [place] [address] Dr [place], [address] [place] [address] Ph #: [phone] Route: Oral  
  
Follow-up Instructions Return if symptoms worsen or fail to improve. Patient Instructions Sore Throat: Care Instructions Your Care Instructions Infection by bacteria or a virus causes most sore throats. Cigarette smoke, dry air, air pollution, allergies, and yelling can also cause a sore throat. Sore throats can be painful and annoying. Fortunately, most sore throats go away on their own. If you have a bacterial infection, your doctor may prescribe antibiotics. Follow-up care is a key part of your treatment and safety. Be sure to make and go to all appointments, and call your doctor if you are having problems. It's also a good idea to know your test results and keep a list of the medicines you take. How can you care for yourself at home? · If your doctor prescribed antibiotics, take them as directed. Do not stop taking them just because you feel better. You need to take the full course of antibiotics. · Gargle with warm salt water once an hour to help reduce swelling and relieve discomfort. Use 1 teaspoon of salt mixed in 1 cup of warm water. · Take an over-the-counter pain medicine, such as acetaminophen (Tylenol), ibuprofen (Advil, Motrin), or naproxen (Aleve). Read and follow all instructions on the label. · Be careful when taking over-the-counter cold or flu medicines and Tylenol at the same time. Many of these medicines have acetaminophen, which is Tylenol. Read the labels to make sure that you are not taking more than the recommended dose. Too much acetaminophen (Tylenol) can be harmful. · Drink plenty of fluids. Fluids may help soothe an irritated throat. Hot fluids, such as tea or soup, may help decrease throat pain. · Use over-the-counter throat lozenges to soothe pain. Regular cough drops or hard candy may also help. These should not be given to young children because of the risk of choking. · Do not smoke or allow others to smoke around you. If you need help quitting, talk to your doctor about stop-smoking programs and medicines. These can increase your chances of quitting for good. · Use a vaporizer or humidifier to add moisture to your bedroom. Follow the directions for cleaning the machine. When should you call for help? Call your doctor now or seek immediate medical care if: 
? · You have new or worse trouble swallowing. ? · Your sore throat gets much worse on one side. ? Watch closely for changes in your health, and be sure to contact your doctor if you do not get better as expected. Where can you learn more? Go to http://kenna-lisa.info/. Enter 062 441 80 [phone] in the search box to learn more about \"Sore Throat: Care Instructions. \" Current as of: May 12, 2017 Content Version: 11.4 © 6641-6044 [employer]. Care instructions adapted under license by [employer] (which disclaims liability or warranty for this information). If you have questions about a medical condition or this instruction, always ask your healthcare professional. [address] any warranty or liability for your use of this information. Introducing [facility]! Elizabeth Mccoy introduces [employer] patient portal. Now you can access parts of your medical record, email your doctor's office, and request medication refills online. 1. In your internet browser, go to [email or web address] 2. Click on the First Time User? Click Here link in the Sign In box. You will see the New Member Sign Up page. 3. Enter your [employer] Access Code exactly as it appears below. You will not need to use this code after youve completed the sign-up process.  If you do not sign up before the expiration date, you must request a new code. · [employer] Access Code: [email or web address] Expires: 4/22/2018 10:58 AM 
 
4. Enter the last four digits of your Social Security Number (xxxx) and Date of Birth (mm/dd/yyyy) as indicated and click Submit. You will be taken to the next sign-up page. 5. Create a [employer] ID. This will be your [employer] login ID and cannot be changed, so think of one that is secure and easy to remember. 6. Create a [employer] password. You can change your password at any time. 7. Enter your Password Reset Question and Answer. This can be used at a later time if you forget your password. 8. Enter your e-mail address. You will receive e-mail notification when new information is available in [address] 9. Click Sign Up. You can now view and download portions of your medical record. 10. Click the Download Summary menu link to download a portable copy of your medical information. If you have questions, please visit the Frequently Asked Questions section of the [employer] website. Remember, [employer] is NOT to be used for urgent needs. For medical emergencies, dial 911. Now available from your iPhone and Android! Please provide this summary of care documentation to your next provider. Your primary care clinician is listed as NAT Munoz 21. If you have any questions after today's visit, please call [phone].

## 2018-02-02 NOTE — PROGRESS NOTES
Sonia Barakat is a 68 y.o. male presenting for Red Eye  .     1. Have you been to the ER, urgent care clinic since your last visit? Hospitalized since your last visit? No    2. Have you seen or consulted any other health care providers outside of the [address] [place] [address] since your last visit? Include any pap smears or colon screening. No    Fall Risk Assessment, last 12 mths 10/3/2017   Able to walk? Yes   Fall in past 12 months? No         Abuse Screening Questionnaire 10/3/2017   Do you ever feel afraid of your partner? N   Are you in a relationship with someone who physically or mentally threatens you? N   Is it safe for you to go home? Y       PHQ over the last two weeks 10/3/2017   Little interest or pleasure in doing things Not at all   Feeling down, depressed or hopeless Not at all   Total Score PHQ 2 0       There are no discontinued medications.

## 2018-02-02 NOTE — PROGRESS NOTES
This note will not be viewable in [address] Sirena Weinberg is a 68 y.o. male and presents with Red Eye  . Subjective:  Mr. Samuel Sherman presents to the office today with 1-2 days worth of a sore throat. He denies any fevers chills, rhinorrhea neck stiffness or rash. There is been no cough. In addition he has noted some redness involving the inner lower aspect of his left eye. The patient notes no eye irritation or drainage. He is taking Xarelto due to underlying factor V Leiden deficiency.     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    Factor V Leiden mutation ([address] 10/3/2017    Gastroesophageal reflux disease without esophagitis 10/3/2017    Herpes zoster virus infection of face and ear nerves 10/3/2017    Hypercholesterolemia 10/3/2017    Long-term (current) use of anticoagulants 10/3/2017    Long-term use of high-risk medication 10/3/2017    Lumbar sprain 10/3/2017    Osteopenia 10/3/2017    Other ill-defined conditions(799.89)     High Cholesterol    Other ill-defined conditions(799.89)     doctor does not want him to have quinolones family -causes achilles tendonitis    Prostatic cancer ([address] 10/3/2017    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/2017    Recurrent deep vein thrombosis (DVT) ([address] 10/3/2017    Renal cell cancer ([address] 10/3/2017    Thromboembolus ([address]     DVT    Toe fracture, left 10/3/2017    Vitamin D deficiency 10/3/2017     Past Surgical History:   Procedure Laterality Date    COLORECTAL SCRN; HI RISK IND  2/3/2015         HX ORTHOPAEDIC      cortisone shot in knee and elbow    HX OTHER SURGICAL      colonoscopy    HX TONSILLECTOMY      HX UROLOGICAL Right     Kidney Removal     Allergies   Allergen Reactions    Quinolones Unknown (comments)     Current Outpatient Prescriptions   Medication Sig Dispense Refill    azithromycin (ZITHROMAX) 250 mg tablet Take 1 Tab by mouth See Admin Instructions. 6 Tab 0    ergocalciferol (ERGOCALCIFEROL) 50,000 unit capsule Take 50,000 Units by mouth. Take one capsule on the 1st and 15th of every month      pravastatin (PRAVACHOL) 40 mg tablet TAKE ONE TABLET BY MOUTH DAILY 90 Tab 3    XARELTO 20 mg tab tablet TAKE ONE TABLET BY MOUTH DAILY 30 Tab 5    sildenafil (REVATIO) 20 mg tablet Take 20 mg by mouth as needed.  diclofenac (VOLTAREN) 1 % gel Apply  to affected area four (4) times daily.  clobetasol (TEMOVATE) 0.05 % topical cream Apply  to affected area two (2) times a day. 15 g 0    Cholecalciferol, Vitamin D3, 3,000 unit tab Take 1 tablet by mouth daily.  calcium carbonate (TUMS) 200 mg calcium (500 mg) chew Take 2 tablets by mouth daily.  calcium-cholecalciferol, d3, (CALCIUM 600 + D) 600-125 mg-unit tab Take 1 tablet by mouth daily.  ibandronate (BONIVA) 150 mg tablet Take 150 mg by mouth every thirty (30) days.  acetaminophen (TYLENOL EXTRA STRENGTH) 500 mg tablet Take 1,000 mg by mouth every six (6) hours as needed for Pain.  diphenhydrAMINE (BENADRYL) 25 mg capsule Take 25 mg by mouth every six (6) hours as needed for Itching or Sleep (allergies).        Social History     Social History    Marital status:      Spouse name: N/A    Number of children: N/A    Years of education: N/A     Social History Main Topics    Smoking status: Never Smoker    Smokeless tobacco: Never Used    Alcohol use No    Drug use: No    Sexual activity: Not Asked     Other Topics Concern    None     Social History Narrative     Family History   Problem Relation Age of Onset    Hypertension Mother    Riaz Fahad Other Mother      abdominal aneurysm    Cancer Father      bladder Review of Systems  Constitutional: negative for fevers, chills, anorexia and weight loss  Eyes:   negative for visual disturbance and irritation. Positive for redness along the inner corner of the left eye  ENT:   Positive for sore throat, drainage. Denies dysphageia. LN's tender. Respiratory:  negative for cough, hemoptysis, dyspnea,wheezing  CV:   negative for chest pain, palpitations, lower extremity edema  GI:   negative for nausea, vomiting, diarrhea, abdominal pain,melena  Integumentary: negative for rash and pruritus  Neurological:  negative for headaches, dizziness, vertigo, memory problems and gait       Objective:  Visit Vitals    /70 (BP 1 Location: Left arm, BP Patient Position: Sitting)    Pulse 62    Ht 6' 1\" (1.854 m)    Wt 184 lb (83.5 kg)    SpO2 98%    BMI 24.28 kg/m2     Body mass index is 24.28 kg/(m^2). Physical Exam:   General appearance - alert, well appearing, and in no distress  Mental status - alert, oriented to person, place, and time  EYE-BLAS, EOMI,  No lid swelling or purulent drainage. There is a sub-conjunctival hemorrhage involving the inner lower quadrant of the left eye  ENT- TM's clear without A/F level. Pharynx erythematous with drainage noted  Nose - normal and patent, no erythema,  Neck - supple, with tender anterior nodes   Chest - clear to auscultation, no wheezes, rales or rhonchi, symmetric air entry   Heart - normal rate, regular rhythm, normal S1, S2, no murmurs, rubs, clicks or gallops   Skin-No rash appreciated  Neuro -alert, oriented, normal speech, no focal findings. Assessment/Plan:  Diagnoses and all orders for this visit:    Acute pharyngitis, unspecified etiology  -     azithromycin (ZITHROMAX) 250 mg tablet; Take 1 Tab by mouth See Admin Instructions. , [id number] Tab, R-0    Subconjunctival hemorrhage of left eye        Other Instructions:  Warm salt water gargles to be started    Tylenol and chloraseptic spray to be used symptomatically    Increase po fluids    I have asked him to hold his Xarelto for 24 hours due to the sub-conjunctival hemorrhage    Follow-up Disposition:  Return if symptoms worsen or fail to improve. I have reviewed with the patient details of the assessment and plan and all questions were answered. Relevent patient education was performed. An After Visit Summary was printed and given to the patient.     Alexys Dallas MD

## 2018-02-02 NOTE — PATIENT INSTRUCTIONS
Sore Throat: Care Instructions  Your Care Instructions    Infection by bacteria or a virus causes most sore throats. Cigarette smoke, dry air, air pollution, allergies, and yelling can also cause a sore throat. Sore throats can be painful and annoying. Fortunately, most sore throats go away on their own. If you have a bacterial infection, your doctor may prescribe antibiotics. Follow-up care is a key part of your treatment and safety. Be sure to make and go to all appointments, and call your doctor if you are having problems. It's also a good idea to know your test results and keep a list of the medicines you take. How can you care for yourself at home? · If your doctor prescribed antibiotics, take them as directed. Do not stop taking them just because you feel better. You need to take the full course of antibiotics. · Gargle with warm salt water once an hour to help reduce swelling and relieve discomfort. Use 1 teaspoon of salt mixed in 1 cup of warm water. · Take an over-the-counter pain medicine, such as acetaminophen (Tylenol), ibuprofen (Advil, Motrin), or naproxen (Aleve). Read and follow all instructions on the label. · Be careful when taking over-the-counter cold or flu medicines and Tylenol at the same time. Many of these medicines have acetaminophen, which is Tylenol. Read the labels to make sure that you are not taking more than the recommended dose. Too much acetaminophen (Tylenol) can be harmful. · Drink plenty of fluids. Fluids may help soothe an irritated throat. Hot fluids, such as tea or soup, may help decrease throat pain. · Use over-the-counter throat lozenges to soothe pain. Regular cough drops or hard candy may also help. These should not be given to young children because of the risk of choking. · Do not smoke or allow others to smoke around you. If you need help quitting, talk to your doctor about stop-smoking programs and medicines.  These can increase your chances of quitting for good. · Use a vaporizer or humidifier to add moisture to your bedroom. Follow the directions for cleaning the machine. When should you call for help? Call your doctor now or seek immediate medical care if:  ? · You have new or worse trouble swallowing. ? · Your sore throat gets much worse on one side. ? Watch closely for changes in your health, and be sure to contact your doctor if you do not get better as expected. Where can you learn more? Go to http://kenna-lisa.info/. Enter [phone] [id number] [phone] in the search box to learn more about \"Sore Throat: Care Instructions. \"  Current as of: May 12, 2017  Content Version: 11.4  © 7533-2965 [employer]. Care instructions adapted under license by [employer] (which disclaims liability or warranty for this information). If you have questions about a medical condition or this instruction, always ask your healthcare professional. [address] any warranty or liability for your use of this information.

## 2018-02-05 ENCOUNTER — HOSPITAL ENCOUNTER (OUTPATIENT)
Dept: GENERAL RADIOLOGY | Age: 78
Discharge: HOME | End: 2018-02-05
Payer: MEDICARE

## 2018-02-05 DIAGNOSIS — Z90.5 HISTORY OF NEPHRECTOMY: ICD-10-CM

## 2018-02-05 PROCEDURE — 71046 X-RAY EXAM CHEST 2 VIEWS: CPT

## 2018-05-09 ENCOUNTER — OFFICE VISIT (OUTPATIENT)
Dept: INTERNAL MEDICINE CLINIC | Age: 78
End: 2018-05-09

## 2018-05-09 VITALS
HEART RATE: 62 BPM | SYSTOLIC BLOOD PRESSURE: 124 MMHG | OXYGEN SATURATION: 98 % | WEIGHT: 172.8 LBS | HEIGHT: 73 IN | DIASTOLIC BLOOD PRESSURE: 80 MMHG | BODY MASS INDEX: 22.9 KG/M2

## 2018-05-09 DIAGNOSIS — S33.5XXA LUMBAR SPRAIN, INITIAL ENCOUNTER: Primary | ICD-10-CM

## 2018-05-09 RX ORDER — CYCLOBENZAPRINE HCL 10 MG
10 TABLET ORAL
Qty: 30 TAB | Refills: 0 | Status: SHIPPED | OUTPATIENT
Start: 2018-05-09 | End: 2018-08-02 | Stop reason: ALTCHOICE

## 2018-05-09 RX ORDER — TRAMADOL HYDROCHLORIDE 50 MG/1
50 TABLET ORAL
Qty: 28 TAB | Refills: 0 | Status: SHIPPED | OUTPATIENT
Start: 2018-05-09 | End: 2018-08-02 | Stop reason: ALTCHOICE

## 2018-05-09 NOTE — PATIENT INSTRUCTIONS
Back Strain: Care Instructions  Your Care Instructions    Back strain happens when you overstretch, or pull, a muscle in your back. You may hurt your back in an accident or when you exercise or lift something. Most back pain will get better with rest and time. You can take care of yourself at home to help your back heal.  Follow-up care is a key part of your treatment and safety. Be sure to make and go to all appointments, and call your doctor if you are having problems. It's also a good idea to know your test results and keep a list of the medicines you take. How can you care for yourself at home? · Try to stay as active as you can, but stop or reduce any activity that causes pain. · Put ice or a cold pack on the sore muscle for 10 to 20 minutes at a time to stop swelling. Try this every 1 to 2 hours for 3 days (when you are awake) or until the swelling goes down. Put a thin cloth between the ice pack and your skin. · After 2 or 3 days, apply a heating pad on low or a warm cloth to your back. Some doctors suggest that you go back and forth between hot and cold treatments. · Take pain medicines exactly as directed. ¨ If the doctor gave you a prescription medicine for pain, take it as prescribed. ¨ If you are not taking a prescription pain medicine, ask your doctor if you can take an over-the-counter medicine. · Try sleeping on your side with a pillow between your legs. Or put a pillow under your knees when you lie on your back. These measures can ease pain in your lower back. · Return to your usual level of activity slowly. When should you call for help? Call 911 anytime you think you may need emergency care. For example, call if:  ? · You are unable to move a leg at all. ?Call your doctor now or seek immediate medical care if:  ? · You have new or worse symptoms in your legs, belly, or buttocks. Symptoms may include:  ¨ Numbness or tingling. ¨ Weakness. ¨ Pain.    ? · You lose bladder or bowel control. ? Watch closely for changes in your health, and be sure to contact your doctor if you are not getting better as expected. Where can you learn more? Go to http://kenna-lisa.info/. Enter S239 in the search box to learn more about \"Back Strain: Care Instructions. \"  Current as of: March 21, 2017  Content Version: 11.4  © 6842-6205 [employer]. Care instructions adapted under license by [employer] (which disclaims liability or warranty for this information). If you have questions about a medical condition or this instruction, always ask your healthcare professional. [place] [address] any warranty or liability for your use of this information.

## 2018-05-09 NOTE — MR AVS SNAPSHOT
[facility], [place], operated by [facility] 
 
 
 [address] [address] [phone] Patient: Angelika Rater MRN: [id number] Visit Information Date & Time Provider Department Dept. Phone Encounter #  
 5/9/2018  1:40 PM Ramiro Real MD [facility] [id number] Follow-up Instructions Return if symptoms worsen or fail to improve. Your Appointments 8/2/2018 10:00 AM  
FOLLOW UP 10 with MD Corazon [address] ([facility]) Appt Note: 6 mo fu; r/s:6 mo fu  
 [address] [phone] [address] [address] Upcoming Health Maintenance Date Due DTaP/Tdap/Td series (1 - Tdap) 9/5/1961 ZOSTER VACCINE AGE 60> 7/5/2000 GLAUCOMA SCREENING Q2Y 9/5/2005 MEDICARE YEARLY EXAM 3/14/2018 Influenza Age 5 to Adult 8/1/2018 Allergies as of 5/9/2018  Review Complete On: 5/9/2018 By: Ramiro Real MD  
  
 Severity Noted Reaction Type Reactions Quinolones  10/03/2017    Unknown (comments) Current Immunizations  Reviewed on 1/22/2018 Name Date Influenza High Dose Vaccine PF 9/22/2017 Influenza Vaccine 10/26/2016, 10/19/2015, 9/25/2014 Pneumococcal Conjugate (PCV-13) 10/19/2015 Pneumococcal Polysaccharide (PPSV-23) 12/1/2005 Not reviewed this visit You Were Diagnosed With   
  
 Codes Comments Lumbar sprain, initial encounter    -  Primary ICD-10-CM: S33. Halie Santos ICD-9-CM: 347. 2 Vitals BP Pulse Height(growth percentile) Weight(growth percentile) SpO2 BMI  
 124/80 (BP 1 Location: Left arm, BP Patient Position: Sitting) 62 6' 1\" (1.854 m) 172 lb 12.8 oz (78.4 kg) 98% 22.8 kg/m2 Smoking Status Never Smoker BMI and BSA Data  Body Mass Index Body Surface Area  
 22.8 kg/m 2 2.01 m 2  
 Preferred Pharmacy Pharmacy Name Phone [facility] [address], [address] [place] [address] Charissa Borden [phone] Your Updated Medication List  
  
   
This list is accurate as of 5/9/18  2:12 PM.  Always use your most recent med list.  
  
  
  
  
 BONIVA 150 mg tablet Generic drug:  ibandronate Take 150 mg by mouth every thirty (30) days. CALCIUM 600 + D 600-125 mg-unit Tab Generic drug:  calcium-cholecalciferol (d3) Take 1 tablet by mouth daily. calcium carbonate 200 mg calcium (500 mg) Chew Commonly known as:  TUMS Take 2 tablets by mouth daily. Cholecalciferol (Vitamin D3) 3,000 unit Tab Take 1 tablet by mouth daily. clobetasol 0.05 % topical cream  
Commonly known as:  Tressia Shayna Apply  to affected area two (2) times a day. cyclobenzaprine 10 mg tablet Commonly known as:  FLEXERIL Take 1 Tab by mouth nightly. diphenhydrAMINE 25 mg capsule Commonly known as:  BENADRYL Take 25 mg by mouth every six (6) hours as needed for Itching or Sleep (allergies). ergocalciferol 50,000 unit capsule Commonly known as:  ERGOCALCIFEROL Take 50,000 Units by mouth. Take one capsule on the 1st and 15th of every month  
  
 pravastatin 40 mg tablet Commonly known as:  PRAVACHOL  
TAKE ONE TABLET BY MOUTH DAILY  
  
 sildenafil (antihypertensive) 20 mg tablet Commonly known as:  REVATIO Take 20 mg by mouth as needed. traMADol 50 mg tablet Commonly known as:  ULTRAM  
Take 1 Tab by mouth every six (6) hours as needed for Pain. Max Daily Amount: 200 mg.  
  
 TYLENOL EXTRA STRENGTH 500 mg tablet Generic drug:  acetaminophen Take 1,000 mg by mouth every six (6) hours as needed for Pain. VOLTAREN 1 % Gel Generic drug:  diclofenac Apply  to affected area four (4) times daily. XARELTO 20 mg Tab tablet Generic drug:  rivaroxaban TAKE ONE TABLET BY MOUTH DAILY Prescriptions Printed Refills  
 cyclobenzaprine (FLEXERIL) 10 mg tablet 0 Sig: Take 1 Tab by mouth nightly. Class: Print Route: Oral  
 traMADol (ULTRAM) 50 mg tablet 0 Sig: Take 1 Tab by mouth every six (6) hours as needed for Pain. Max Daily Amount: 200 mg. Class: Print Route: Oral  
  
Follow-up Instructions Return if symptoms worsen or fail to improve. Introducing [facility]! Dinesh Benedict introduces [employer] patient portal. Now you can access parts of your medical record, email your doctor's office, and request medication refills online. 1. In your internet browser, go to [email or web address] 2. Click on the First Time User? Click Here link in the Sign In box. You will see the New Member Sign Up page. 3. Enter your Portero Access Code exactly as it appears below. You will not need to use this code after youve completed the sign-up process. If you do not sign up before the expiration date, you must request a new code. · Portero Access Code: [email or web address] Expires: 8/7/2018  2:12 PM 
 
4. Enter the last four digits of your Social Security Number (xxxx) and Date of Birth (mm/dd/yyyy) as indicated and click Submit. You will be taken to the next sign-up page. 5. Create a Portero ID. This will be your Portero login ID and cannot be changed, so think of one that is secure and easy to remember. 6. Create a Portero password. You can change your password at any time. 7. Enter your Password Reset Question and Answer. This can be used at a later time if you forget your password. 8. Enter your e-mail address. You will receive e-mail notification when new information is available in [address] 9. Click Sign Up. You can now view and download portions of your medical record. 10. Click the Download Summary menu link to download a portable copy of your medical information.  
 
If you have questions, please visit the Frequently Asked Questions section of the [employer]ER. Remember, [facility] is NOT to be used for urgent needs. For medical emergencies, dial 911. Now available from your iPhone and Android! Please provide this summary of care documentation to your next provider. Your primary care clinician is listed as NAT Aguilera. If you have any questions after today's visit, please call [phone].

## 2018-05-09 NOTE — PROGRESS NOTES
This note will not be viewable in [address]          68 y.o. male presents with complaints of LBP    His pain began over the last 24 hours after he bent over to pick something up. The pain is in the right lumbar region. He notes that it feels muscular and is had similar back sprain in the past.  He denies any spinal pain or radicular discomfort. He notes stiffness with inactivity and notes that it is hard to stand up from a sitting position or sit down from a standing position. He notes that if he walks any distance that the pain will loosen up. He denies any lower extremity weakness. There is been no rash.     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    Factor V Leiden mutation ([address] 10/3/2017    Gastroesophageal reflux disease without esophagitis 10/3/2017    Herpes zoster virus infection of face and ear nerves 10/3/2017    Hypercholesterolemia 10/3/2017    Long-term (current) use of anticoagulants 10/3/2017    Long-term use of high-risk medication 10/3/2017    Lumbar sprain 10/3/2017    Osteopenia 10/3/2017    Other ill-defined conditions(799.89)     High Cholesterol    Other ill-defined conditions(799.89)     doctor does not want him to have quinolones family -causes achilles tendonitis    Prostatic cancer ([address] 10/3/2017    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/2017    Recurrent deep vein thrombosis (DVT) ([address] 10/3/2017    Renal cell cancer ([address] 10/3/2017    Thromboembolus ([address]     DVT    Toe fracture, left 10/3/2017    Vitamin D deficiency 10/3/2017     Past Surgical History:   Procedure Laterality Date    COLORECTAL SCRN; HI RISK IND  2/3/2015         HX ORTHOPAEDIC      cortisone shot in knee and elbow    HX OTHER SURGICAL      colonoscopy    HX TONSILLECTOMY      HX UROLOGICAL Right     Kidney Removal     Allergies   Allergen Reactions    Quinolones Unknown (comments)     Current Outpatient Prescriptions   Medication Sig Dispense Refill    cyclobenzaprine (FLEXERIL) 10 mg tablet Take 1 Tab by mouth nightly. 30 Tab 0    traMADol (ULTRAM) 50 mg tablet Take 1 Tab by mouth every six (6) hours as needed for Pain. Max Daily Amount: 200 mg. 28 Tab 0    ergocalciferol (ERGOCALCIFEROL) 50,000 unit capsule Take 50,000 Units by mouth. Take one capsule on the 1st and 15th of every month      pravastatin (PRAVACHOL) 40 mg tablet TAKE ONE TABLET BY MOUTH DAILY 90 Tab 3    XARELTO 20 mg tab tablet TAKE ONE TABLET BY MOUTH DAILY 30 Tab 5    sildenafil (REVATIO) 20 mg tablet Take 20 mg by mouth as needed.  diclofenac (VOLTAREN) 1 % gel Apply  to affected area four (4) times daily.  clobetasol (TEMOVATE) 0.05 % topical cream Apply  to affected area two (2) times a day. 15 g 0    Cholecalciferol, Vitamin D3, 3,000 unit tab Take 1 tablet by mouth daily.  calcium carbonate (TUMS) 200 mg calcium (500 mg) chew Take 2 tablets by mouth daily.  calcium-cholecalciferol, d3, (CALCIUM 600 + D) 600-125 mg-unit tab Take 1 tablet by mouth daily.  ibandronate (BONIVA) 150 mg tablet Take 150 mg by mouth every thirty (30) days.  acetaminophen (TYLENOL EXTRA STRENGTH) 500 mg tablet Take 1,000 mg by mouth every six (6) hours as needed for Pain.  diphenhydrAMINE (BENADRYL) 25 mg capsule Take 25 mg by mouth every six (6) hours as needed for Itching or Sleep (allergies).        Social History     Social History    Marital status:      Spouse name: N/A    Number of children: N/A    Years of education: N/A     Social History Main Topics    Smoking status: Never Smoker    Smokeless tobacco: Never Used    Alcohol use No    Drug use: No    Sexual activity: Not Asked     Other Topics Concern    None     Social History Narrative     Family History   Problem Relation Age of Onset    Hypertension Mother     Other Mother      abdominal aneurysm    Cancer Father      bladder        Review of Systems:  Gen: no fatigue, fever, chills,  Nose: no rhinorrhea, no sinus pain  Mouth: no oral lesions, no sore throat  Resp: no shortness of breath, no wheezing, no cough  CV: no chest pain, no orthopnea, no paroxysmal nocturnal dyspnea, no lower extremity edema, no palpitations  Abd: no nausea, no heartburn, no diarrhea, no constipation, no abdominal pain  Back: Positive for back pain without radiculopathy. Stiffness with ROM  Neuro: no headaches, no syncope or presyncopal episodes  Heme: no lymphadenopathy, no easy bruising or bleeding  ROS otherwise negative    Visit Vitals    /80 (BP 1 Location: Left arm, BP Patient Position: Sitting)    Pulse 62    Ht 6' 1\" (1.854 m)    Wt 172 lb 12.8 oz (78.4 kg)    SpO2 98%    BMI 22.8 kg/m2     Gen: alert, oriented, no acute distress  HEENT: Unremarkable  Neck: Supple without adenopathy  Resp: no increase work of breathing, lungs clear to ausculation bilaterally, no wheezing, rales or rhonchi  CV: RRR. No murmurs, rubs, or gallops. Abd: soft, not tender, not distended. No hepatosplenomegaly. Normal bowel sounds. Neuro: cranial nerves intact, normal strength and movement in all extremities, reflexes and sensation intact and symmetric. Skin: no lesion or rash  Extremities: no cyanosis or edema  Back: Lower  to palpation on affected side. Twisting ROM limited. SLR negative bilaterally. LE DTR's symmetric. Diagnoses and all orders for this visit:    Lumbar sprain, initial encounter  -     cyclobenzaprine (FLEXERIL) 10 mg tablet; Take 1 Tab by mouth nightly. , Print, Disp-30 Tab, R-0  -     traMADol (ULTRAM) 50 mg tablet; Take 1 Tab by mouth every six (6) hours as needed for Pain.  Max Daily Amount: 200 mg., Print, Disp-28 Tab, R-0        Other instructions: The patient has a back sprain that he is previously had before. We will treat conservatively as we have done in the past.    He is unable to take anti-inflammatory medication due to the use of Xarelto. We will give him tramadol for pain and have him take a muscle relaxer at bedtime. Moist heat, stretching exercises and walking are encouraged. Follow-up if there is worsening    Follow-up Disposition:  Return if symptoms worsen or fail to improve.     Marga Kelley MD

## 2018-05-09 NOTE — PROGRESS NOTES
Cole Dang is a 68 y.o. male presenting for Back Pain  . 1. Have you been to the ER, urgent care clinic since your last visit? Hospitalized since your last visit? No    2. Have you seen or consulted any other health care providers outside of the [address] [place] [address] since your last visit? Include any pap smears or colon screening. No    Fall Risk Assessment, last 12 mths 5/9/2018   Able to walk? Yes   Fall in past 12 months? No         Abuse Screening Questionnaire 5/9/2018   Do you ever feel afraid of your partner? N   Are you in a relationship with someone who physically or mentally threatens you? N   Is it safe for you to go home? Y       PHQ over the last two weeks 5/9/2018   Little interest or pleasure in doing things Not at all   Feeling down, depressed or hopeless Not at all   Total Score PHQ 2 0       There are no discontinued medications.

## 2018-08-02 ENCOUNTER — OFFICE VISIT (OUTPATIENT)
Dept: INTERNAL MEDICINE CLINIC | Age: 78
End: 2018-08-02

## 2018-08-02 VITALS
HEIGHT: 73 IN | BODY MASS INDEX: 22.4 KG/M2 | DIASTOLIC BLOOD PRESSURE: 80 MMHG | SYSTOLIC BLOOD PRESSURE: 124 MMHG | HEART RATE: 73 BPM | OXYGEN SATURATION: 97 % | WEIGHT: 169 LBS

## 2018-08-02 DIAGNOSIS — Z23 ENCOUNTER FOR IMMUNIZATION: ICD-10-CM

## 2018-08-02 DIAGNOSIS — Z00.00 INITIAL MEDICARE ANNUAL WELLNESS VISIT: Primary | ICD-10-CM

## 2018-08-02 DIAGNOSIS — D68.51 FACTOR V LEIDEN MUTATION (HCC): Chronic | ICD-10-CM

## 2018-08-02 DIAGNOSIS — I82.409 RECURRENT DEEP VEIN THROMBOSIS (DVT) (HCC): ICD-10-CM

## 2018-08-02 DIAGNOSIS — E78.00 HYPERCHOLESTEROLEMIA: Chronic | ICD-10-CM

## 2018-08-02 DIAGNOSIS — Z79.01 LONG TERM CURRENT USE OF ANTICOAGULANT THERAPY: Chronic | ICD-10-CM

## 2018-08-02 DIAGNOSIS — Z79.899 LONG-TERM USE OF HIGH-RISK MEDICATION: Chronic | ICD-10-CM

## 2018-08-02 DIAGNOSIS — E55.9 VITAMIN D DEFICIENCY: Chronic | ICD-10-CM

## 2018-08-02 RX ORDER — LANOLIN ALCOHOL/MO/W.PET/CERES
500 CREAM (GRAM) TOPICAL DAILY
COMMUNITY

## 2018-08-02 NOTE — PROGRESS NOTES
This note will not be viewable in [address] Stephanie Houser is a 68 y.o. male who presents for an Initial Medicare Annual Wellness Exam (AWV) and follow up of chronic medical conditions. The patient states that he has not had an annual wellness exam done within the last year I have reviewed the patient's medical history in detail and updated the computerized patient record. History Subjective: 
Mr. Coy Bowers is a 51-year-old  male who presents the office today for a Medicare wellness check as well as follow-up of multiple medical problems. The patient has hypercholesterolemia and remains on pravastatin therapy. He denies muscle soreness or GI upset. The patient has recently been on an [place] type of diet. He has no history of coronary artery disease and he denies exertional chest pains or claudication. The patient has a history of recurrent DVT with subsequent finding of factor V Leiden mutation for which he is now on long-term anticoagulation. He currently is on Xarelto and is had no bleeding problems. He denies any symptoms of recurrent DVT and is had no chest pain palpitations or shortness of breath. Patient has a vitamin D deficiency along with osteopenia. He is on calcium with vitamin D along with a vitamin D supplement. He does take Boniva monthly. He has had no GI upset related to his medications and he has had no histories of falls or fractures. Patient Active Problem List  
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  
 Gastroesophageal reflux disease without esophagitis K21.9  Herpes zoster virus infection of face and ear nerves B02.29  
 Hypercholesterolemia E78.00  Long term current use of anticoagulant therapy Z79.01  
 Long-term use of high-risk medication Z79.899  Lumbar sprain S33. 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 
Esteban Rick MD as PCP - General (Internal Medicine) Charito Iglesias MD (Endocrinology) Kaylynn Mensah MD (Dermatology) Roselia Tomlinson MD (Urology) Judy Lopez DPM (Podiatry) Past Medical History:  
Diagnosis Date  Allergic rhinitis 10/3/2017  Arthritis  Cancer ([address] Prostate  Cancer ([address]   
 kidney  Chronic kidney disease  Coagulation disorder ([address] factor V leiden mutation- tends to have blood clots-   
 Colon polyps 10/3/2017  Duodenitis 10/3/2017  Eczema 10/3/2017  ED (erectile dysfunction) of organic origin 10/3/2017  Factor V Leiden mutation ([facility] 10/3/2017  Gastroesophageal reflux disease without esophagitis 10/3/2017  Herpes zoster virus infection of face and ear nerves 10/3/2017  Hypercholesterolemia 10/3/2017  Long-term (current) use of anticoagulants 10/3/2017  Long-term use of high-risk medication 10/3/2017  Lumbar sprain 10/3/2017  Osteopenia 10/3/2017  Other ill-defined conditions(799.89) High Cholesterol  Other ill-defined conditions(799.89) doctor does not want him to have quinolones family -causes achilles tendonitis  Prostatic cancer ([address] 10/3/2017  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/2017  Recurrent deep vein thrombosis (DVT) ([address] 10/3/2017  Renal cell cancer ([address] 10/3/2017  Thromboembolus ([address] DVT  Toe fracture, left 10/3/2017  Vitamin D deficiency 10/3/2017 Past Surgical History:  
Procedure Laterality Date  COLORECTAL SCRN; HI RISK IND  2/3/2015  HX ORTHOPAEDIC    
 cortisone shot in knee and elbow  HX OTHER SURGICAL    
 colonoscopy  HX TONSILLECTOMY  HX UROLOGICAL Right  Kidney Removal Allergies Allergen Reactions  Quinolones Unknown (comments) Current Outpatient Prescriptions Medication Sig Dispense Refill  cyanocobalamin (VITAMIN B-12) 500 mcg tablet Take 500 mcg by mouth daily.  ergocalciferol (ERGOCALCIFEROL) 50,000 unit capsule Take 50,000 Units by mouth. Take one capsule on the 1st and 15th of every month  pravastatin (PRAVACHOL) 40 mg tablet TAKE ONE TABLET BY MOUTH DAILY 90 Tab 3  XARELTO 20 mg tab tablet TAKE ONE TABLET BY MOUTH DAILY 30 Tab 5  
 sildenafil (REVATIO) 20 mg tablet Take 20 mg by mouth as needed.  Cholecalciferol, Vitamin D3, 3,000 unit tab Take 1 tablet by mouth daily.  calcium carbonate (TUMS) 200 mg calcium (500 mg) chew Take 2 tablets by mouth daily.  calcium-cholecalciferol, d3, (CALCIUM 600 + D) 600-125 mg-unit tab Take 1 tablet by mouth daily.  ibandronate (BONIVA) 150 mg tablet Take 150 mg by mouth every thirty (30) days.  acetaminophen (TYLENOL EXTRA STRENGTH) 500 mg tablet Take 1,000 mg by mouth every six (6) hours as needed for Pain.  diphenhydrAMINE (BENADRYL) 25 mg capsule Take 25 mg by mouth every six (6) hours as needed for Itching or Sleep (allergies). Social History Social History  Marital status:  Spouse name: N/A  
 Number of children: N/A  
 Years of education: N/A Social History Main Topics  Smoking status: Never Smoker  Smokeless tobacco: Never Used  Alcohol use No  
 Drug use: No  
 Sexual activity: Not Asked Other Topics Concern  None Social History Narrative Family History Problem Relation Age of Onset  Hypertension Mother  Other Mother   
  abdominal aneurysm  Cancer Father   
  bladder Health Maintenance Topic Date Due  
 DTaP/Tdap/Td series (1 - Tdap) 09/05/1961  ZOSTER VACCINE AGE 60>  07/05/2000  GLAUCOMA SCREENING Q2Y  09/05/2005  MEDICARE YEARLY EXAM  03/14/2018  Influenza Age 5 to Adult 08/01/2018  Pneumococcal 65+ High/Highest Risk  Completed Review of Systems Constitutional: negative for fevers, chills, anorexia and weight loss Eyes:   negative for visual disturbance and irritation ENT:   negative for tinnitus,sore throat,nasal congestion,ear pain,hoarseness Respiratory:  negative for cough, hemoptysis, dyspnea,wheezing CV:   negative for chest pain, palpitations, lower extremity edema GI:   negative for nausea, vomiting, diarrhea, abdominal pain,melena Endo:               negative for polyuria,polydipsia,polyphagia,heat intolerance Genitourinary: negative for frequency, dysuria and hematuria Integumentary: negative for rash and pruritus Hematologic:  negative for easy bruising and gum/nose bleeding Musculoskel: negative for myalgias, arthralgias, back pain, muscle weakness, joint pain Neurological:  negative for headaches, dizziness, vertigo, memory problems and gait Behavl/Psych: negative for feelings of anxiety, depression, mood changes ROS otherwise negative Depression Risk Factor Screening: PHQ over the last two weeks 5/9/2018 Little interest or pleasure in doing things Not at all Feeling down, depressed, irritable, or hopeless Not at all Total Score PHQ 2 0 Alcohol Risk Factor Screening: You do not drink alcohol or very rarely. Functional Ability and Level of Safety:  
Hearing Loss The patient wears hearing aids. Activities of Daily Living ADL Assessment 8/2/2018 Feeding yourself No Help Needed Getting from bed to chair No Help Needed Getting dressed No Help Needed Bathing or showering No Help Needed Walk across the room (includes cane/walker) No Help Needed Using the telphone No Help Needed Taking your medications No Help Needed Preparing meals No Help Needed Managing money (expenses/bills) No Help Needed Moderately strenuous housework (laundry) No Help Needed Shopping for personal items (toiletries/medicines) No Help Needed Shopping for groceries No Help Needed Driving No Help Needed Climbing a flight of stairs No Help Needed Getting to places beyond walking distances No Help Needed Fall Risk Fall Risk Assessment, last 12 mths 5/9/2018 Able to walk? Yes Fall in past 12 months? No  
 
 
Abuse Screen Abuse Screening Questionnaire 5/9/2018 Do you ever feel afraid of your partner? Wendy Distel Are you in a relationship with someone who physically or mentally threatens you? Wendy Distel Is it safe for you to go home? Madeleine Parker Cognitive Screening Evaluation of Cognitive Function: 
Has your family/caregiver stated any concerns about your memory: no 
 
Physical Exam  
 
Visit Vitals  /80  Pulse 73  Ht 6' 1\" (1.854 m)  Wt 169 lb (76.7 kg)  SpO2 97%  BMI 22.3 kg/m2 Body mass index is 22.3 kg/(m^2). General appearance - alert, well appearing, and in no distress Mental status - alert, oriented to person, place, and time EYE-BLAS, EOMI,conjunctiva normal bilaterally, lids normal 
ENT-ENT exam normal, no neck nodes or sinus tenderness Nose - normal and patent, no erythema,  Or discharge Mouth - mucous membranes moist, pharynx normal without lesions Neck - supple, no significant adenopathy or bruit Chest - clear to auscultation, no wheezes, rales or rhonchi. Heart - normal rate, regular rhythm, normal S1, S2, no murmurs, rubs, clicks or gallops Abdomen - soft, nontender, nondistended, no masses or organomegaly Lymph- no adenopathy palpable Ext-peripheral pulses normal, no pedal edema, no clubbing or cyanosis Skin-Warm and dry. no hyperpigmentation, vitiligo, or suspicious lesions Neuro -alert, oriented, normal speech, no focal findings or movement disorder noted Assessment/Plan  
[place] education and counseling provided: 
Age appropriate evidence-based preventive care recommendations based on today's review and evaluation; including relevant cancer screening guidelines, and vaccination recommendations. An After Visit Summary was printed and given to the patient which information about these guidelines, and a personalized schedule for health maintenance items. Whe appropriate and with patient agreement, orders noted below were placed to complete missing health maintenance items. Additional Plan for follow up chronic medical conditions includes: 
Diagnoses and all orders for this visit: 
 
Initial Medicare annual wellness visit Hypercholesterolemia 
-     COLLECTION VENOUS BLOOD,VENIPUNCTURE 
-     LIPID PANEL 
-     TSH 3RD GENERATION Long-term use of high-risk medication -     AMB POC COMPLETE CBC,AUTOMATED ENTER 
-     AMB POC URINALYSIS DIP STICK AUTO W/ MICRO  
-     CK 
-     METABOLIC PANEL, COMPREHENSIVE Factor V Leiden mutation ([facility] Recurrent deep vein thrombosis (DVT) ([facility] Long term current use of anticoagulant therapy Vitamin D deficiency 
-     VITAMIN D, 25 HYDROXY Encounter for immunization -     Tetanus, diphtheria toxoids and acellular pertussis (TDAP) vaccine, in individuals >=7 years, IM 
-     NC IMMUNIZ ADMIN,1 SINGLE/COMB VAC/TOXOID Other instructions: The patient's medications are reviewed and reconciled. No change in his current medical regimen is made. A prudent diet and exercise is encouraged. Advanced care planning was discussed with the patient today. Health maintenance issues were reviewed and age-appropriate vaccinations were the only issues to be commented on. I have recommended an influenza vaccination as of September 1 and also have recommended that he receive his shingles vaccination at his pharmacy. The patient is also due for a tetanus shot and he requests a Tdap be given today. This will be ordered. Await results of multiple labs Follow-up 6 months Follow-up Disposition: 
Return in about 6 months (around 2/2/2019).  
 
I have reviewed with the patient details of the assessment and plan and all questions were answered. Relevent patient education was performed. The most recent lab findings were reviewed with the patient.  
 
Nalini Álvarez MD

## 2018-08-02 NOTE — PROGRESS NOTES
Chief Complaint Patient presents with  Cholesterol Problem 6 mo fu [address] [facility] Annual Wellness Visit Depression Risk Factor Screening: PHQ over the last two weeks 5/9/2018 Little interest or pleasure in doing things Not at all Feeling down, depressed, irritable, or hopeless Not at all Total Score PHQ 2 0 Functional Ability and Level of Safety: Activities of Daily Living ADL Assessment 8/2/2018 Feeding yourself No Help Needed Getting from bed to chair No Help Needed Getting dressed No Help Needed Bathing or showering No Help Needed Walk across the room (includes cane/walker) No Help Needed Using the telphone No Help Needed Taking your medications No Help Needed Preparing meals No Help Needed Managing money (expenses/bills) No Help Needed Moderately strenuous housework (laundry) No Help Needed Shopping for personal items (toiletries/medicines) No Help Needed Shopping for groceries No Help Needed Driving No Help Needed Climbing a flight of stairs No Help Needed Getting to places beyond walking distances No Help Needed Fall Risk Fall Risk Assessment, last 12 mths 5/9/2018 Able to walk? Yes Fall in past 12 months? No  
 
 
Abuse Screen Abuse Screening Questionnaire 5/9/2018 Do you ever feel afraid of your partner? Wendy Distel Are you in a relationship with someone who physically or mentally threatens you? Wendy Distel Is it safe for you to go home? Madeleine Parker Patient Care Team  
Patient Care Team: 
Lorenza Litten, MD as PCP - General (Internal Medicine) Kelton Ferrer MD (Endocrinology) Medhat Monzon MD (Dermatology) Kate Calderon MD (Urology) Alta Quarles DPM (Podiatry)

## 2018-08-02 NOTE — MR AVS SNAPSHOT
[address] [facility] [address] [id number] [phone] Patient: Akin Pickens MRN: [id number] [id number] Visit Information Date & Time Provider Department Dept. Phone Encounter #  
 8/2/2018 10:00 AM Karthik Ball MD [facility] [phone] [id number] Follow-up Instructions Return in about 6 months (around 2/2/2019). Upcoming Health Maintenance Date Due DTaP/Tdap/Td series (1 - Tdap) 9/5/1961 ZOSTER VACCINE AGE 60> 7/5/2000 GLAUCOMA SCREENING Q2Y 9/5/2005 MEDICARE YEARLY EXAM 3/14/2018 Influenza Age 5 to Adult 8/1/2018 Allergies as of 8/2/2018  Review Complete On: 8/2/2018 By: Karthik Ball MD  
  
 Severity Noted Reaction Type Reactions Quinolones  10/03/2017    Unknown (comments) Current Immunizations  Reviewed on 1/22/2018 Name Date Influenza High Dose Vaccine PF 9/22/2017 Influenza Vaccine 10/26/2016, 10/19/2015, 9/25/2014 Pneumococcal Conjugate (PCV-13) 10/19/2015 Pneumococcal Polysaccharide (PPSV-23) 12/1/2005 Tdap  Incomplete Not reviewed this visit You Were Diagnosed With   
  
 Codes Comments Initial Medicare annual wellness visit    -  Primary ICD-10-CM: Z00.00 ICD-9-CM: V70.0 Hypercholesterolemia     ICD-10-CM: E78.00 ICD-9-CM: 272.0 Long-term use of high-risk medication     ICD-10-CM: Z79.899 ICD-9-CM: V58.69 Factor V Leiden mutation [facility])     ICD-10-CM: K96.61 
ICD-9-CM: 289.81 Recurrent deep vein thrombosis (DVT) (HCC)     ICD-10-CM: I82.409 ICD-9-CM: 453.40 Long term current use of anticoagulant therapy     ICD-10-CM: Z79.01 
ICD-9-CM: V58.61 Vitamin D deficiency     ICD-10-CM: E55.9 ICD-9-CM: 268.9 Encounter for immunization     ICD-10-CM: H48 ICD-9-CM: V03.89 Vitals BP Pulse Height(growth percentile) Weight(growth percentile) SpO2 BMI 124/80 73 6' 1\" (1.854 m) 169 lb (76.7 kg) 97% 22.3 kg/m2 Smoking Status Never Smoker Vitals History BMI and BSA Data Body Mass Index Body Surface Area  
 22.3 kg/m 2 1.99 m 2 Preferred Pharmacy Pharmacy Name Phone [facility] [address] [facility] [phone] Your Updated Medication List  
  
   
This list is accurate as of 8/2/18 10:19 AM.  Always use your most recent med list.  
  
  
  
  
 BONIVA 150 mg tablet Generic drug:  ibandronate Take 150 mg by mouth every thirty (30) days. CALCIUM 600 + D 600-125 mg-unit Tab Generic drug:  calcium-cholecalciferol (d3) Take 1 tablet by mouth daily. calcium carbonate 200 mg calcium (500 mg) Chew Commonly known as:  TUMS Take 2 tablets by mouth daily. Cholecalciferol (Vitamin D3) 3,000 unit Tab Take 1 tablet by mouth daily. diphenhydrAMINE 25 mg capsule Commonly known as:  BENADRYL Take 25 mg by mouth every six (6) hours as needed for Itching or Sleep (allergies). ergocalciferol 50,000 unit capsule Commonly known as:  ERGOCALCIFEROL Take 50,000 Units by mouth. Take one capsule on the 1st and 15th of every month  
  
 pravastatin 40 mg tablet Commonly known as:  PRAVACHOL  
TAKE ONE TABLET BY MOUTH DAILY  
  
 sildenafil (antihypertensive) 20 mg tablet Commonly known as:  REVATIO Take 20 mg by mouth as needed. TYLENOL EXTRA STRENGTH 500 mg tablet Generic drug:  acetaminophen Take 1,000 mg by mouth every six (6) hours as needed for Pain. VITAMIN B-12 500 mcg tablet Generic drug:  cyanocobalamin Take 500 mcg by mouth daily. XARELTO 20 mg Tab tablet Generic drug:  rivaroxaban TAKE ONE TABLET BY MOUTH DAILY We Performed the Following AMB POC COMPLETE CBC,AUTOMATED ENTER S5866959 CPT(R)] AMB POC URINALYSIS DIP STICK AUTO W/ MICRO  [12205 CPT(R)] CK X8861732 CPT(R)] COLLECTION VENOUS BLOOD,VENIPUNCTURE A3323660 CPT(R)] LIPID PANEL [37868 CPT(R)] METABOLIC PANEL, COMPREHENSIVE [92677 CPT(R)] WV IMMUNIZ ADMIN,1 SINGLE/COMB VAC/TOXOID C141536 CPT(R)] TETANUS, DIPHTHERIA TOXOIDS AND ACELLULAR PERTUSSIS VACCINE (TDAP), IN INDIVIDS. >=7, IM [id number] CPT(R)] TSH 3RD GENERATION [24612 CPT(R)] VITAMIN D, 25 HYDROXY L6811032 CPT(R)] Follow-up Instructions Return in about 6 months (around 2/2/2019). Patient Instructions The best way to stay healthy is to live a healthy lifestyle. A healthy lifestyle includes regular exercise, eating a well-balanced diet, keeping a healthy weight and not smoking. Regular physical exams and screening tests are another important way to take care of yourself. Preventive exams provided by health care providers can find health problems early when treatment works best and can keep you from getting certain diseases or illnesses. Preventive services include exams, lab tests, screenings, shots, monitoring and information to help you take care of your own health. All people over 65 should have a pneumonia shot. Pneumonia shots are usually only needed once in a lifetime unless your doctor decides differently. In addition to your physical exam, some screening tests are recommended: 
 
All people over 65 should have a yearly flu shot. People over 65 are at medium to high risk for Hepatitis B. Three shots are needed for complete protection. Bone mass measurement (dexa scan) is recommended every two years. Diabetes Mellitus screening is recommended every year. Glaucoma is an eye disease caused by high pressure in the eye. An eye exam is recommended every year. Cardiovascular screening tests that check your cholesterol and other blood fat (lipid) levels are recommended every five years.   
 
Colorectal Cancer screening tests help to find pre-cancerous polyps (growths in the colon) so they can be removed before they turn into cancer. Tests ordered for screening depend on your personal and family history risk factors. Prostate Cancer Screening (annually up to age 76) Screening for breast cancer is recommended yearly with a Mammogram. 
 
Screening for cervical and vaginal cancer is recommended with a pelvic and Pap test every two years. However if you have had an abnormal pap in the past  three years or at high risk for cervical or vaginal cancer Medicare will cover a pap test and a pelvic exam every year. Here is a list of your current Health Maintenance items with a due date: 
Health Maintenance Due Topic Date Due  
 DTaP/Tdap/Td  (1 - Tdap) 1961  Shingles Vaccine  2000  Glaucoma Screening   2005 [facility] Annual Well Visit  2018  Flu Vaccine  2018 Vaccine Information Statement Tdap (Tetanus, Diphtheria, Pertussis) Vaccine: What You Need to Know Many Vaccine Information Statements are available in German and other languages. See www.immunize.org/vis. Hojas de Información Sobre Vacunas están disponibles en español y en muchos otros idiomas. Visite WorthScale.si 1. Why get vaccinated? Tetanus, diphtheria, and pertussis are very serious diseases. Tdap vaccine can protect us from these diseases. And, Tdap vaccine given to pregnant women can protect  babies against pertussis. TETANUS (Lockjaw) is rare in the [facility] today. It causes painful muscle tightening and stiffness, usually all over the body. ? It can lead to tightening of muscles in the head and neck so you cant open your mouth, swallow, or sometimes even breathe. Tetanus kills about 1 out of 10 people who are infected even after receiving the best medical care. DIPHTHERIA is also rare in the [facility] today. It can cause a thick coating to form in the back of the throat. ? It can lead to breathing problems, heart failure, paralysis, and death. PERTUSSIS (Whooping Cough) causes severe coughing spells, which can cause difficulty breathing, vomiting, and disturbed sleep. ? It can also lead to weight loss, incontinence, and rib fractures. Up to 2 in 100 adolescents and 5 in 100 adults with pertussis are hospitalized or have complications, which could include pneumonia or death. These diseases are caused by bacteria. Diphtheria and pertussis are spread from person to person through secretions from coughing or sneezing. Tetanus enters the body through cuts, scratches, or wounds. Before vaccines, as many as 200,000 cases of diphtheria, 200,000 cases of pertussis, and hundreds of cases of tetanus, were reported in the [place] each year. Since vaccination began, reports of cases for tetanus and diphtheria have dropped by about 99% and for pertussis by about 80%. 2. Tdap vaccine Tdap vaccine can protect adolescents and adults from tetanus, diphtheria, and pertussis. One dose of Tdap is routinely given at age 6 or 15. People who did not get Tdap at that age should get it as soon as possible. Tdap is especially important for health care professionals and anyone having close contact with a baby younger than 12 months. Pregnant women should get a dose of Tdap during every pregnancy, to protect the  from pertussis. Infants are most at risk for severe, life-threatening complications from pertussis. Another vaccine, called Td, protects against tetanus and diphtheria, but not pertussis. A Td booster should be given every 10 years. Tdap may be given as one of these boosters if you have never gotten Tdap before. Tdap may also be given after a severe cut or burn to prevent tetanus infection. Your doctor or the person giving you the vaccine can give you more information. Tdap may safely be given at the same time as other vaccines. 3. Some people should not get this vaccine  A person who has ever had a life-threatening allergic reaction after a previous dose of any diphtheria, tetanus or pertussis containing vaccine, OR has a severe allergy to any part of this vaccine, should not get Tdap vaccine. Tell the person giving the vaccine about any severe allergies.  Anyone who had coma or long repeated seizures within 7 days after a childhood dose of DTP or DTaP, or a previous dose of Tdap, should not get Tdap, unless a cause other than the vaccine was found. They can still get Td.  Talk to your doctor if you: 
- have seizures or another nervous system problem, 
- had severe pain or swelling after any vaccine containing diphtheria, tetanus or pertussis,  
- ever had a condition called Guillain Barré Syndrome (GBS), 
- arent feeling well on the day the shot is scheduled. 4. Risks With any medicine, including vaccines, there is a chance of side effects. These are usually mild and go away on their own. Serious reactions are also possible but are rare. Most people who get Tdap vaccine do not have any problems with it. Mild Problems following Tdap 
(Did not interfere with activities)  Pain where the shot was given (about 3 in 4 adolescents or 2 in 3 adults)  Redness or swelling where the shot was given (about 1 person in 5)  Mild fever of at least 100.4°F (up to about 1 in 25 adolescents or 1 in 100 adults)  Headache (about 3 or 4 people in 10)  Tiredness (about 1 person in 3 or 4)  Nausea, vomiting, diarrhea, stomach ache (up to 1 in 4 adolescents or 1 in 10 adults)  Chills,  sore joints (about 1 person in 10)  Body aches (about 1 person in 3 or 4)  Rash, swollen glands (uncommon) Moderate Problems following Tdap (Interfered with activities, but did not require medical attention)  Pain where the shot was given (up to 1 in 5 or 6)  Redness or swelling where the shot was given (up to about 1 in 16 adolescents or 1 in 12 adults)  Fever over 102°F (about 1 in 100 adolescents or 1 in 250 adults)  Headache (about 1 in 7 adolescents or 1 in 10 adults)  Nausea, vomiting, diarrhea, stomach ache (up to 1 or 3 people in 100)  Swelling of the entire arm where the shot was given (up to about 1 in 500). Severe Problems following Tdap 
(Unable to perform usual activities; required medical attention)  Swelling, severe pain, bleeding, and redness in the arm where the shot was given (rare). Problems that could happen after any vaccine:  People sometimes faint after a medical procedure, including vaccination. Sitting or lying down for about 15 minutes can help prevent fainting, and injuries caused by a fall. Tell your doctor if you feel dizzy, or have vision changes or ringing in the ears.  Some people get severe pain in the shoulder and have difficulty moving the arm where a shot was given. This happens very rarely.  Any medication can cause a severe allergic reaction. Such reactions from a vaccine are very rare, estimated at fewer than 1 in a million doses, and would happen within a few minutes to a few hours after the vaccination. As with any medicine, there is a very remote chance of a vaccine causing a serious injury or death. The safety of vaccines is always being monitored. For more information, visit: www.cdc.gov/vaccinesafety/ 
 
 
The [facility] Vaccine Injury Compensation Program (VICP) is a federal program that was created to compensate people who may have been injured by certain vaccines. Persons who believe they may have been injured by a vaccine can learn about the program and about filing a claim by calling 9-571.499.2994 or visiting the [employer] website at www.[facility].gov/vaccinecompensation. There is a time limit to file a claim for compensation. 7. How can I learn more?  Ask your doctor. He or she can give you the vaccine package insert or suggest other sources of information.  Call your local or state health department.  Contact the Centers for Disease Control and Prevention (CDC): 
- Call 4-612.678.3827 (1-800-CDC-INFO) or 
- Visit CDCs website at www.cdc.gov/vaccines Vaccine Information Statement Tdap Vaccine 
(2/24/2015) [address] 177WF-07 Department of Health and [employer] Centers for Disease Control and Prevention Office Use Only Advance Directives: Care Instructions Your Care Instructions An advance directive is a legal way to state your wishes at the end of your life. It tells your family and your doctor what to do if you can no longer say what you want. There are two main types of advance directives. You can change them any time that your wishes change. · A living will tells your family and your doctor your wishes about life support and other treatment.  
· A durable power of  for health care lets you name a person to make treatment decisions for you when you can't speak for yourself. This person is called a health care agent. If you do not have an advance directive, decisions about your medical care may be made by a doctor or a  who doesn't know you. It may help to think of an advance directive as a gift to the people who care for you. If you have one, they won't have to make tough decisions by themselves. Follow-up care is a key part of your treatment and safety. Be sure to make and go to all appointments, and call your doctor if you are having problems. It's also a good idea to know your test results and keep a list of the medicines you take. How can you care for yourself at home? · Discuss your wishes with your loved ones and your doctor. This way, there are no surprises. · Many states have a unique form. Or you might use a universal form that has been approved by many states. This kind of form can sometimes be completed and stored online. Your electronic copy will then be available wherever you have a connection to the Internet. In most cases, doctors will respect your wishes even if you have a form from a different state. · You don't need a  to do an advance directive. But you may want to get legal advice. · Think about these questions when you prepare an advance directive: ¨ Who do you want to make decisions about your medical care if you are not able to? Many people choose a family member or close friend. ¨ Do you know enough about life support methods that might be used? If not, talk to your doctor so you understand. ¨ What are you most afraid of that might happen? You might be afraid of having pain, losing your independence, or being kept alive by machines. ¨ Where would you prefer to die? Choices include your home, a hospital, or a nursing home. ¨ Would you like to have information about hospice care to support you and your family? ¨ Do you want to donate organs when you die? ¨ Do you want certain Jew practices performed before you die? If so, put your wishes in the advance directive. · Read your advance directive every year, and make changes as needed. When should you call for help? Be sure to contact your doctor if you have any questions. Where can you learn more? Go to http://kenna-lisa.info/. Enter R264 in the search box to learn more about \"Advance Directives: Care Instructions. \" Current as of: October 6, 2017 Content Version: 11.7 © 4748-4469 [employer]. Care instructions adapted under license by [employer] (which disclaims liability or warranty for this information). If you have questions about a medical condition or this instruction, always ask your healthcare professional. [address] any warranty or liability for your use of this information. Introducing [facility]! Peyton Santana introduces [employer] patient portal. Now you can access parts of your medical record, email your doctor's office, and request medication refills online. 1. In your internet browser, go to [email or web address] 2. Click on the First Time User? Click Here link in the Sign In box. You will see the New Member Sign Up page. 3. Enter your [employer] Access Code exactly as it appears below. You will not need to use this code after youve completed the sign-up process. If you do not sign up before the expiration date, you must request a new code. · [employer] Access Code: [email or web address] Expires: 8/7/2018  2:12 PM 
 
4. Enter the last four digits of your Social Security Number (xxxx) and Date of Birth (mm/dd/yyyy) as indicated and click Submit. You will be taken to the next sign-up page. 5. Create a [employer] ID. This will be your [employer] login ID and cannot be changed, so think of one that is secure and easy to remember. 6. Create a [employer] password. You can change your password at any time. 7. Enter your Password Reset Question and Answer. This can be used at a later time if you forget your password. 8. Enter your e-mail address. You will receive e-mail notification when new information is available in [address] 9. Click Sign Up. You can now view and download portions of your medical record. 10. Click the Download Summary menu link to download a portable copy of your medical information. If you have questions, please visit the Frequently Asked Questions section of the [employer] website. Remember, [employer] is NOT to be used for urgent needs. For medical emergencies, dial 911. Now available from your iPhone and Android! Please provide this summary of care documentation to your next provider. Your primary care clinician is listed as NAT Munoz 21. If you have any questions after today's visit, please call [phone].

## 2018-08-02 NOTE — PATIENT INSTRUCTIONS
The best way to stay healthy is to live a healthy lifestyle. A healthy lifestyle includes regular exercise, eating a well-balanced diet, keeping a healthy weight and not smoking. Regular physical exams and screening tests are another important way to take care of yourself. Preventive exams provided by health care providers can find health problems early when treatment works best and can keep you from getting certain diseases or illnesses. Preventive services include exams, lab tests, screenings, shots, monitoring and information to help you take care of your own health. All people over 65 should have a pneumonia shot. Pneumonia shots are usually only needed once in a lifetime unless your doctor decides differently. In addition to your physical exam, some screening tests are recommended: 
 
All people over 65 should have a yearly flu shot. People over 65 are at medium to high risk for Hepatitis B. Three shots are needed for complete protection. Bone mass measurement (dexa scan) is recommended every two years. Diabetes Mellitus screening is recommended every year. Glaucoma is an eye disease caused by high pressure in the eye. An eye exam is recommended every year. Cardiovascular screening tests that check your cholesterol and other blood fat (lipid) levels are recommended every five years. Colorectal Cancer screening tests help to find pre-cancerous polyps (growths in the colon) so they can be removed before they turn into cancer. Tests ordered for screening depend on your personal and family history risk factors. Prostate Cancer Screening (annually up to age 76) Screening for breast cancer is recommended yearly with a Mammogram. 
 
Screening for cervical and vaginal cancer is recommended with a pelvic and Pap test every two years.  However if you have had an abnormal pap in the past  three years or at high risk for cervical or vaginal cancer Medicare will cover a pap test and a pelvic exam every year. Here is a list of your current Health Maintenance items with a due date: 
Health Maintenance Due Topic Date Due  
 DTaP/Tdap/Td  (1 - Tdap) 1961  Shingles Vaccine  2000  Glaucoma Screening   2005 [facility] Annual Well Visit  2018  Flu Vaccine  2018 Vaccine Information Statement Tdap (Tetanus, Diphtheria, Pertussis) Vaccine: What You Need to Know Many Vaccine Information Statements are available in Greek and other languages. See www.immunize.org/vis. Hojas de Información Sobre Vacunas están disponibles en español y en muchos otros idiomas. Visite WorthScale.si 1. Why get vaccinated? Tetanus, diphtheria, and pertussis are very serious diseases. Tdap vaccine can protect us from these diseases. And, Tdap vaccine given to pregnant women can protect  babies against pertussis. TETANUS (Lockjaw) is rare in the [facility] today. It causes painful muscle tightening and stiffness, usually all over the body.  It can lead to tightening of muscles in the head and neck so you cant open your mouth, swallow, or sometimes even breathe. Tetanus kills about 1 out of 10 people who are infected even after receiving the best medical care. DIPHTHERIA is also rare in the [facility] today. It can cause a thick coating to form in the back of the throat.  It can lead to breathing problems, heart failure, paralysis, and death. PERTUSSIS (Whooping Cough) causes severe coughing spells, which can cause difficulty breathing, vomiting, and disturbed sleep.  It can also lead to weight loss, incontinence, and rib fractures. Up to 2 in 100 adolescents and 5 in 100 adults with pertussis are hospitalized or have complications, which could include pneumonia or death. These diseases are caused by bacteria. Diphtheria and pertussis are spread from person to person through secretions from coughing or sneezing.  Tetanus enters the body through cuts, scratches, or wounds. Before vaccines, as many as 200,000 cases of diphtheria, 200,000 cases of pertussis, and hundreds of cases of tetanus, were reported in the [place] each year. Since vaccination began, reports of cases for tetanus and diphtheria have dropped by about 99% and for pertussis by about 80%. 2. Tdap vaccine Tdap vaccine can protect adolescents and adults from tetanus, diphtheria, and pertussis. One dose of Tdap is routinely given at age 6 or 15. People who did not get Tdap at that age should get it as soon as possible. Tdap is especially important for health care professionals and anyone having close contact with a baby younger than 12 months. Pregnant women should get a dose of Tdap during every pregnancy, to protect the  from pertussis. Infants are most at risk for severe, life-threatening complications from pertussis. Another vaccine, called Td, protects against tetanus and diphtheria, but not pertussis. A Td booster should be given every 10 years. Tdap may be given as one of these boosters if you have never gotten Tdap before. Tdap may also be given after a severe cut or burn to prevent tetanus infection. Your doctor or the person giving you the vaccine can give you more information. Tdap may safely be given at the same time as other vaccines. 3. Some people should not get this vaccine  A person who has ever had a life-threatening allergic reaction after a previous dose of any diphtheria, tetanus or pertussis containing vaccine, OR has a severe allergy to any part of this vaccine, should not get Tdap vaccine. Tell the person giving the vaccine about any severe allergies.  Anyone who had coma or long repeated seizures within 7 days after a childhood dose of DTP or DTaP, or a previous dose of Tdap, should not get Tdap, unless a cause other than the vaccine was found. They can still get Td.  
 
 Talk to your doctor if you: 
- have seizures or another nervous system problem, 
- had severe pain or swelling after any vaccine containing diphtheria, tetanus or pertussis,  
- ever had a condition called Guillain Barré Syndrome (GBS), 
- arent feeling well on the day the shot is scheduled. 4. Risks With any medicine, including vaccines, there is a chance of side effects. These are usually mild and go away on their own. Serious reactions are also possible but are rare. Most people who get Tdap vaccine do not have any problems with it. Mild Problems following Tdap 
(Did not interfere with activities)  Pain where the shot was given (about 3 in 4 adolescents or 2 in 3 adults)  Redness or swelling where the shot was given (about 1 person in 5)  Mild fever of at least 100.4°F (up to about 1 in 25 adolescents or 1 in 100 adults)  Headache (about 3 or 4 people in 10)  Tiredness (about 1 person in 3 or 4)  Nausea, vomiting, diarrhea, stomach ache (up to 1 in 4 adolescents or 1 in 10 adults)  Chills,  sore joints (about 1 person in 10)  Body aches (about 1 person in 3 or 4)  Rash, swollen glands (uncommon) Moderate Problems following Tdap (Interfered with activities, but did not require medical attention)  Pain where the shot was given (up to 1 in 5 or 6)  Redness or swelling where the shot was given (up to about 1 in 16 adolescents or 1 in 12 adults)  Fever over 102°F (about 1 in 100 adolescents or 1 in 250 adults)  Headache (about 1 in 7 adolescents or 1 in 10 adults)  Nausea, vomiting, diarrhea, stomach ache (up to 1 or 3 people in 100)  Swelling of the entire arm where the shot was given (up to about 1 in 500). Severe Problems following Tdap 
(Unable to perform usual activities; required medical attention)  Swelling, severe pain, bleeding, and redness in the arm where the shot was given (rare). Problems that could happen after any vaccine:  People sometimes faint after a medical procedure, including vaccination. Sitting or lying down for about 15 minutes can help prevent fainting, and injuries caused by a fall. Tell your doctor if you feel dizzy, or have vision changes or ringing in the ears.  Some people get severe pain in the shoulder and have difficulty moving the arm where a shot was given. This happens very rarely.  Any medication can cause a severe allergic reaction. Such reactions from a vaccine are very rare, estimated at fewer than 1 in a million doses, and would happen within a few minutes to a few hours after the vaccination. As with any medicine, there is a very remote chance of a vaccine causing a serious injury or death. The safety of vaccines is always being monitored. For more information, visit: www.cdc.gov/vaccinesafety/ 
 
 
The [facility] Vaccine Injury Compensation Program (VICP) is a federal program that was created to compensate people who may have been injured by certain vaccines.  
 
Persons who believe they may have been injured by a vaccine can learn about the program and about filing a claim by calling 3-747.866.8852 or visiting the [employer] website at [email or web address]/[email or web address]. There is a time limit to file a claim for compensation. 7. How can I learn more?  Ask your doctor. He or she can give you the vaccine package insert or suggest other sources of information.  Call your local or state health department.  Contact the Centers for Disease Control and Prevention (CDC): 
- Call 7-954.409.4082 (1-800-CDC-INFO) or 
- Visit CDCs website at www.cdc.gov/vaccines Vaccine Information Statement Tdap Vaccine 
(2/24/2015) 42 JANN [address] 639LE-25 Department of Health and [place] Centers for Disease Control and Prevention Office Use Only Advance Directives: Care Instructions Your Care Instructions An advance directive is a legal way to state your wishes at the end of your life. It tells your family and your doctor what to do if you can no longer say what you want. There are two main types of advance directives. You can change them any time that your wishes change. · A living will tells your family and your doctor your wishes about life support and other treatment. · A durable power of  for health care lets you name a person to make treatment decisions for you when you can't speak for yourself. This person is called a health care agent. If you do not have an advance directive, decisions about your medical care may be made by a doctor or a  who doesn't know you. It may help to think of an advance directive as a gift to the people who care for you. If you have one, they won't have to make tough decisions by themselves. Follow-up care is a key part of your treatment and safety. Be sure to make and go to all appointments, and call your doctor if you are having problems. It's also a good idea to know your test results and keep a list of the medicines you take.  
How can you care for yourself at home? · Discuss your wishes with your loved ones and your doctor. This way, there are no surprises. · Many states have a unique form. Or you might use a universal form that has been approved by many states. This kind of form can sometimes be completed and stored online. Your electronic copy will then be available wherever you have a connection to the Internet. In most cases, doctors will respect your wishes even if you have a form from a different state. · You don't need a  to do an advance directive. But you may want to get legal advice. · Think about these questions when you prepare an advance directive: ¨ Who do you want to make decisions about your medical care if you are not able to? Many people choose a family member or close friend. ¨ Do you know enough about life support methods that might be used? If not, talk to your doctor so you understand. ¨ What are you most afraid of that might happen? You might be afraid of having pain, losing your independence, or being kept alive by machines. ¨ Where would you prefer to die? Choices include your home, a hospital, or a nursing home. ¨ Would you like to have information about hospice care to support you and your family? ¨ Do you want to donate organs when you die? ¨ Do you want certain Confucianism practices performed before you die? If so, put your wishes in the advance directive. · Read your advance directive every year, and make changes as needed. When should you call for help? Be sure to contact your doctor if you have any questions. Where can you learn more? Go to http://kenna-lisa.info/. Enter R264 in the search box to learn more about \"Advance Directives: Care Instructions. \" Current as of: October 6, 2017 Content Version: 11.7 © 1355-6298 [employer]. Care instructions adapted under license by [employer] (which disclaims liability or warranty for this information).  If you have questions about a medical condition or this instruction, always ask your healthcare professional. [place] [address] any warranty or liability for your use of this information.

## 2018-08-03 LAB
25(OH)D3+25(OH)D2 SERPL-MCNC: 48.7 NG/ML (ref 30–100)
ALBUMIN SERPL-MCNC: 4.5 G/DL (ref 3.5–4.8)
ALBUMIN/GLOB SERPL: 2 {RATIO} (ref 1.2–2.2)
ALP SERPL-CCNC: 51 IU/L (ref 39–117)
ALT SERPL-CCNC: 14 IU/L (ref 0–44)
AST SERPL-CCNC: 18 IU/L (ref 0–40)
BACTERIA UA POCT, BACTPOCT: NORMAL
BILIRUB SERPL-MCNC: 0.7 MG/DL (ref 0–1.2)
BILIRUB UR QL STRIP: NEGATIVE
BUN SERPL-MCNC: 16 MG/DL (ref 8–27)
BUN/CREAT SERPL: 14 (ref 10–24)
CALCIUM SERPL-MCNC: 10 MG/DL (ref 8.6–10.2)
CASTS UA POCT: 0
CHLORIDE SERPL-SCNC: 103 MMOL/L (ref 96–106)
CHOLEST SERPL-MCNC: 175 MG/DL (ref 100–199)
CK SERPL-CCNC: 84 U/L (ref 24–204)
CLUE CELLS, CLUEPOCT: NEGATIVE
CO2 SERPL-SCNC: 27 MMOL/L (ref 20–29)
CREAT SERPL-MCNC: 1.18 MG/DL (ref 0.76–1.27)
CRYSTALS UA POCT, CRYSPOCT: NEGATIVE
EPITHELIAL CELLS POCT: NEGATIVE
GLOBULIN SER CALC-MCNC: 2.2 G/DL (ref 1.5–4.5)
GLUCOSE SERPL-MCNC: 87 MG/DL (ref 65–99)
GLUCOSE UR-MCNC: NEGATIVE MG/DL
GRAN# POC: 3.6 K/UL (ref 2–7.8)
GRAN% POC: 62.6 % (ref 37–92)
HCT VFR BLD CALC: 44.4 % (ref 37–51)
HDLC SERPL-MCNC: 67 MG/DL
HGB BLD-MCNC: 14.6 G/DL (ref 12–18)
KETONES P FAST UR STRIP-MCNC: NEGATIVE MG/DL
LDLC SERPL CALC-MCNC: 92 MG/DL (ref 0–99)
LY# POC: 1.7 K/UL (ref 0.6–4.1)
LY% POC: 32.6 % (ref 10–58.5)
MCH RBC QN: 28.9 PG (ref 26–32)
MCHC RBC-ENTMCNC: 32.9 G/DL (ref 30–36)
MCV RBC: 88 FL (ref 80–97)
MID #, POC: 0.2 K/UL (ref 0–1.8)
MID% POC: 4.8 % (ref 0.1–24)
MUCUS UA POCT, MUCPOCT: NORMAL
PH UR STRIP: 7 [PH] (ref 5–7)
PLATELET # BLD: 149 K/UL (ref 140–440)
POTASSIUM SERPL-SCNC: 4.3 MMOL/L (ref 3.5–5.2)
PROT SERPL-MCNC: 6.7 G/DL (ref 6–8.5)
PROT UR QL STRIP: NEGATIVE
RBC # BLD: 5.06 M/UL (ref 4.2–6.3)
RBC UA POCT, RBCPOCT: 0
SODIUM SERPL-SCNC: 143 MMOL/L (ref 134–144)
SP GR UR STRIP: 1.01 (ref 1.01–1.02)
TRICH UA POCT, TRICHPOC: NEGATIVE
TRIGL SERPL-MCNC: 80 MG/DL (ref 0–149)
TSH SERPL DL<=0.005 MIU/L-ACNC: 2.86 UIU/ML (ref 0.45–4.5)
UA UROBILINOGEN AMB POC: NORMAL (ref 0.2–1)
URINALYSIS CLARITY POC: CLEAR
URINALYSIS COLOR POC: NORMAL
URINE BLOOD POC: NEGATIVE
URINE CULT COMMENT, POCT: NORMAL
URINE LEUKOCYTES POC: NEGATIVE
URINE NITRITES POC: NEGATIVE
VLDLC SERPL CALC-MCNC: 16 MG/DL (ref 5–40)
WBC # BLD: 5.5 K/UL (ref 4.1–10.9)
WBC UA POCT, WBCPOCT: 0
YEAST UA POCT, YEASTPOC: NEGATIVE

## 2018-08-07 RX ORDER — RIVAROXABAN 20 MG/1
TABLET, FILM COATED ORAL
Qty: 30 TAB | Refills: 4 | Status: SHIPPED | OUTPATIENT
Start: 2018-08-07 | End: 2019-01-01 | Stop reason: SDUPTHER

## 2018-08-09 ENCOUNTER — OFFICE VISIT (OUTPATIENT)
Dept: INTERNAL MEDICINE CLINIC | Age: 78
End: 2018-08-09

## 2018-08-09 VITALS
BODY MASS INDEX: 22.4 KG/M2 | OXYGEN SATURATION: 96 % | HEART RATE: 66 BPM | SYSTOLIC BLOOD PRESSURE: 130 MMHG | HEIGHT: 73 IN | TEMPERATURE: 97.6 F | DIASTOLIC BLOOD PRESSURE: 80 MMHG | WEIGHT: 169 LBS

## 2018-08-09 DIAGNOSIS — J02.9 ACUTE PHARYNGITIS, UNSPECIFIED ETIOLOGY: Primary | ICD-10-CM

## 2018-08-09 RX ORDER — AZITHROMYCIN 250 MG/1
250 TABLET, FILM COATED ORAL SEE ADMIN INSTRUCTIONS
Qty: 6 TAB | Refills: 0 | Status: SHIPPED | OUTPATIENT
Start: 2018-08-09 | End: 2018-08-14

## 2018-08-09 NOTE — PROGRESS NOTES
Prasad Cassette presents with   Chief Complaint   Patient presents with    Sore Throat    Cough   Patient of Dr Diego Oviedo here with complaint of sore throat & slight cough since yesterday. No fever or chills noted. 1. Have you been to the ER, urgent care clinic since your last visit? Hospitalized since your last visit? No    2. Have you seen or consulted any other health care providers outside of the [facility] since your last visit? Include any pap smears or colon screening.  No

## 2018-08-09 NOTE — PROGRESS NOTES
Subjective:   is a very pleasant 68year old gentleman, patient of Dr. Amilcar Brian, who comes in today with a 2-3 day history of a very sore throat. He denies nasal congestion or postnasal drainage. He denies any shortness of breath, has had occasional cough, but denies wheezing or hemoptysis. Denies chills or fever. Denies nausea or vomiting.       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    Factor V Leiden mutation ([address] 10/3/2017    Gastroesophageal reflux disease without esophagitis 10/3/2017    Herpes zoster virus infection of face and ear nerves 10/3/2017    Hypercholesterolemia 10/3/2017    Long-term (current) use of anticoagulants 10/3/2017    Long-term use of high-risk medication 10/3/2017    Lumbar sprain 10/3/2017    Osteopenia 10/3/2017    Other ill-defined conditions(799.89)     High Cholesterol    Other ill-defined conditions(799.89)     doctor does not want him to have quinolones family -causes achilles tendonitis    Prostatic cancer ([address] 10/3/2017    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/2017    Recurrent deep vein thrombosis (DVT) ([address] 10/3/2017    Renal cell cancer ([address] 10/3/2017    Thromboembolus ([address]     DVT    Toe fracture, left 10/3/2017    Vitamin D deficiency 10/3/2017     Past Surgical History:   Procedure Laterality Date    COLORECTAL SCRN; HI RISK IND  2/3/2015         HX ORTHOPAEDIC      cortisone shot in knee and elbow    HX OTHER SURGICAL      colonoscopy    HX TONSILLECTOMY      HX UROLOGICAL Right     Kidney Removal       Current Outpatient Prescriptions on File Prior to Visit   Medication Sig Dispense Refill    XARELTO 20 mg tab tablet TAKE ONE TABLET BY MOUTH DAILY 30 Tab 4    cyanocobalamin (VITAMIN B-12) 500 mcg tablet Take 500 mcg by mouth daily.  ergocalciferol (ERGOCALCIFEROL) 50,000 unit capsule Take 50,000 Units by mouth. Take one capsule on the 1st and 15th of every month      pravastatin (PRAVACHOL) 40 mg tablet TAKE ONE TABLET BY MOUTH DAILY 90 Tab 3    sildenafil (REVATIO) 20 mg tablet Take 20 mg by mouth as needed.  calcium carbonate (TUMS) 200 mg calcium (500 mg) chew Take 2 tablets by mouth daily.  calcium-cholecalciferol, d3, (CALCIUM 600 + D) 600-125 mg-unit tab Take 1 tablet by mouth daily.  ibandronate (BONIVA) 150 mg tablet Take 150 mg by mouth every thirty (30) days.  acetaminophen (TYLENOL EXTRA STRENGTH) 500 mg tablet Take 1,000 mg by mouth every six (6) hours as needed for Pain.  diphenhydrAMINE (BENADRYL) 25 mg capsule Take 25 mg by mouth every six (6) hours as needed for Itching or Sleep (allergies). No current facility-administered medications on file prior to visit. Allergies   Allergen Reactions    Quinolones Unknown (comments)       Physical Examination:  GENERAL:  Pleasant in no acute distress. He is alert and oriented. VITALS:  BP: 130/80. P: 66. O2 sat: 96%. HEENT:  Normocephalic, atraumatic. TMs normal.  He does wear bilateral hearing aids. Mouth mucosa pink. Tongue midline. Pharynx very erythematous without presence of exudates. No sinus tenderness. NECK:  Supple with mildly tender tonsillar adenopathy. CHEST:  Lungs clear to auscultation, no rales or wheezes. CV:  Heart regular rhythm without murmur. EXTREMITIES:  No edema or calf tenderness. Distal pulses were present. Impression:  1. Acute pharyngitis. Plan:  1. It was opted to start him on a Z-Jaime.    2. He may gargle with salty water. 3. He may use Tylenol alternating with ibuprofen as needed for discomfort.   4. He will call us if he fails to improve or if his condition worsens.

## 2018-08-09 NOTE — PATIENT INSTRUCTIONS
Sore Throat: Care Instructions  Your Care Instructions    Infection by bacteria or a virus causes most sore throats. Cigarette smoke, dry air, air pollution, allergies, and yelling can also cause a sore throat. Sore throats can be painful and annoying. Fortunately, most sore throats go away on their own. If you have a bacterial infection, your doctor may prescribe antibiotics. Follow-up care is a key part of your treatment and safety. Be sure to make and go to all appointments, and call your doctor if you are having problems. It's also a good idea to know your test results and keep a list of the medicines you take. How can you care for yourself at home? · If your doctor prescribed antibiotics, take them as directed. Do not stop taking them just because you feel better. You need to take the full course of antibiotics. · Gargle with warm salt water once an hour to help reduce swelling and relieve discomfort. Use 1 teaspoon of salt mixed in 1 cup of warm water. · Take an over-the-counter pain medicine, such as acetaminophen (Tylenol), ibuprofen (Advil, Motrin), or naproxen (Aleve). Read and follow all instructions on the label. · Be careful when taking over-the-counter cold or flu medicines and Tylenol at the same time. Many of these medicines have acetaminophen, which is Tylenol. Read the labels to make sure that you are not taking more than the recommended dose. Too much acetaminophen (Tylenol) can be harmful. · Drink plenty of fluids. Fluids may help soothe an irritated throat. Hot fluids, such as tea or soup, may help decrease throat pain. · Use over-the-counter throat lozenges to soothe pain. Regular cough drops or hard candy may also help. These should not be given to young children because of the risk of choking. · Do not smoke or allow others to smoke around you. If you need help quitting, talk to your doctor about stop-smoking programs and medicines.  These can increase your chances of quitting for good. · Use a vaporizer or humidifier to add moisture to your bedroom. Follow the directions for cleaning the machine. When should you call for help? Call your doctor now or seek immediate medical care if:    · You have new or worse trouble swallowing.     · Your sore throat gets much worse on one side.    Watch closely for changes in your health, and be sure to contact your doctor if you do not get better as expected. Where can you learn more? Go to http://kenna-lisa.info/. Enter [phone] [id number] [phone] in the search box to learn more about \"Sore Throat: Care Instructions. \"  Current as of: May 12, 2017  Content Version: 11.7  © 7899-7163 [employer]. Care instructions adapted under license by [employer] (which disclaims liability or warranty for this information). If you have questions about a medical condition or this instruction, always ask your healthcare professional. [address] any warranty or liability for your use of this information.

## 2018-08-09 NOTE — MR AVS SNAPSHOT
[facility] 
 
 
 [facility] [address] [address] [phone] Patient: Prasad Soriano MRN: [id number] CVL:[id number]/[id number] Visit Information Date & Time Provider Department Dept. Phone Encounter #  
 8/9/2018  2:00 PM Penelope Zelaya NP [address] [facility] [phone] [id number] Follow-up Instructions Return if symptoms worsen or fail to improve. Your Appointments 2/5/2019 10:00 AM  
FOLLOW UP 10 with MD Corazon Trevino 26 ([facility]) Appt Note: 6 month follow up appointment [place] [address] [id number] [address] [facility] [id number] Upcoming Health Maintenance Date Due ZOSTER VACCINE AGE 60> 7/5/2000 GLAUCOMA SCREENING Q2Y 9/5/2005 Influenza Age 5 to Adult 8/1/2018 MEDICARE YEARLY EXAM 8/3/2019 DTaP/Tdap/Td series (2 - Td) 8/2/2028 Allergies as of 8/9/2018  Review Complete On: 8/9/2018 By: Penelope Zelaya NP Severity Noted Reaction Type Reactions Quinolones  10/03/2017    Unknown (comments) Current Immunizations  Reviewed on 1/22/2018 Name Date Influenza High Dose Vaccine PF 9/22/2017 Influenza Vaccine 10/26/2016, 10/19/2015, 9/25/2014 Pneumococcal Conjugate (PCV-13) 10/19/2015 Pneumococcal Polysaccharide (PPSV-23) 12/1/2005 Tdap 8/2/2018 Not reviewed this visit You Were Diagnosed With   
  
 Codes Comments Acute pharyngitis, unspecified etiology    -  Primary ICD-10-CM: J02.9 ICD-9-CM: 096 Vitals BP Pulse Temp Height(growth percentile) Weight(growth percentile) SpO2  
 130/80 (BP 1 Location: Left arm, BP Patient Position: Sitting) 66 97.6 °F (36.4 °C) (Oral) 6' 1\" (1.854 m) 169 lb (76.7 kg) 96% BMI Smoking Status 22.3 kg/m2 Never Smoker Vitals History BMI and BSA Data Body Mass Index Body Surface Area  
 22.3 kg/m 2 1.99 m 2 Preferred Pharmacy Pharmacy Name Phone [address] [place], [address] [facility] Vaishali Dates [phone] Your Updated Medication List  
  
   
This list is accurate as of 8/9/18  2:21 PM.  Always use your most recent med list.  
  
  
  
  
 azithromycin 250 mg tablet Commonly known as:  Chelsey[place]ash [place] Take 1 Tab by mouth See Admin Instructions for 5 days. BONIVA 150 mg tablet Generic drug:  ibandronate Take 150 mg by mouth every thirty (30) days. CALCIUM 600 + D 600-125 mg-unit Tab Generic drug:  calcium-cholecalciferol (d3) Take 1 tablet by mouth daily. calcium carbonate 200 mg calcium (500 mg) Chew Commonly known as:  TUMS Take 2 tablets by mouth daily. diphenhydrAMINE 25 mg capsule Commonly known as:  BENADRYL Take 25 mg by mouth every six (6) hours as needed for Itching or Sleep (allergies). ergocalciferol 50,000 unit capsule Commonly known as:  ERGOCALCIFEROL Take 50,000 Units by mouth. Take one capsule on the 1st and 15th of every month  
  
 pravastatin 40 mg tablet Commonly known as:  PRAVACHOL  
TAKE ONE TABLET BY MOUTH DAILY  
  
 sildenafil (antihypertensive) 20 mg tablet Commonly known as:  REVATIO Take 20 mg by mouth as needed. TYLENOL EXTRA STRENGTH 500 mg tablet Generic drug:  acetaminophen Take 1,000 mg by mouth every six (6) hours as needed for Pain. VITAMIN B-12 500 mcg tablet Generic drug:  cyanocobalamin Take 500 mcg by mouth daily. XARELTO 20 mg Tab tablet Generic drug:  rivaroxaban TAKE ONE TABLET BY MOUTH DAILY Prescriptions Sent to Pharmacy Refills  
 azithromycin (ZITHROMAX) 250 mg tablet 0 Sig: Take 1 Tab by mouth See Admin Instructions for 5 days.   
 Class: Normal  
 Pharmacy: [address] [place], [address] [facility] Delphina Boxer  #: [phone] Route: Oral  
  
Follow-up Instructions Return if symptoms worsen or fail to improve. Patient Instructions Sore Throat: Care Instructions Your Care Instructions Infection by bacteria or a virus causes most sore throats. Cigarette smoke, dry air, air pollution, allergies, and yelling can also cause a sore throat. Sore throats can be painful and annoying. Fortunately, most sore throats go away on their own. If you have a bacterial infection, your doctor may prescribe antibiotics. Follow-up care is a key part of your treatment and safety. Be sure to make and go to all appointments, and call your doctor if you are having problems. It's also a good idea to know your test results and keep a list of the medicines you take. How can you care for yourself at home? · If your doctor prescribed antibiotics, take them as directed. Do not stop taking them just because you feel better. You need to take the full course of antibiotics. · Gargle with warm salt water once an hour to help reduce swelling and relieve discomfort. Use 1 teaspoon of salt mixed in 1 cup of warm water. · Take an over-the-counter pain medicine, such as acetaminophen (Tylenol), ibuprofen (Advil, Motrin), or naproxen (Aleve). Read and follow all instructions on the label. · Be careful when taking over-the-counter cold or flu medicines and Tylenol at the same time. Many of these medicines have acetaminophen, which is Tylenol. Read the labels to make sure that you are not taking more than the recommended dose. Too much acetaminophen (Tylenol) can be harmful. · Drink plenty of fluids. Fluids may help soothe an irritated throat. Hot fluids, such as tea or soup, may help decrease throat pain. · Use over-the-counter throat lozenges to soothe pain. Regular cough drops or hard candy may also help. These should not be given to young children because of the risk of choking. · Do not smoke or allow others to smoke around you. If you need help quitting, talk to your doctor about stop-smoking programs and medicines. These can increase your chances of quitting for good. · Use a vaporizer or humidifier to add moisture to your bedroom. Follow the directions for cleaning the machine. When should you call for help? Call your doctor now or seek immediate medical care if: 
  · You have new or worse trouble swallowing.  
  · Your sore throat gets much worse on one side.  
 Watch closely for changes in your health, and be sure to contact your doctor if you do not get better as expected. Where can you learn more? Go to http://kenna-lisa.info/. Enter 062 441 80 [phone] in the search box to learn more about \"Sore Throat: Care Instructions. \" Current as of: May 12, 2017 Content Version: 11.7 © 8221-2628 5by. Care instructions adapted under license by [employer] (which disclaims liability or warranty for this information). If you have questions about a medical condition or this instruction, always ask your healthcare professional. [address] any warranty or liability for your use of this information. Introducing [facility] HE[facility]! Sienna Peters introduces [facility] patient portal. Now you can access parts of your medical record, email your doctor's office, and request medication refills online. 1. In your internet browser, go to [email or web address] 2. Click on the First Time User? Click Here link in the Sign In box. You will see the New Member Sign Up page. 3. Enter your [facility] Access Code exactly as it appears below. You will not need to use this code after youve completed the sign-up process. If you do not sign up before the expiration date, you must request a new code. · [facility] Access Code: Stephanie Mamadou Expires: 11/7/2018  1:50 PM 
 
 4. Enter the last four digits of your Social Security Number (xxxx) and Date of Birth (mm/dd/yyyy) as indicated and click Submit. You will be taken to the next sign-up page. 5. Create a [employer] ID. This will be your [employer] login ID and cannot be changed, so think of one that is secure and easy to remember. 6. Create a [employer] password. You can change your password at any time. 7. Enter your Password Reset Question and Answer. This can be used at a later time if you forget your password. 8. Enter your e-mail address. You will receive e-mail notification when new information is available in [address] 9. Click Sign Up. You can now view and download portions of your medical record. 10. Click the Download Summary menu link to download a portable copy of your medical information. If you have questions, please visit the Frequently Asked Questions section of the [employer] website. Remember, [employer] is NOT to be used for urgent needs. For medical emergencies, dial 911. Now available from your iPhone and Android! Please provide this summary of care documentation to your next provider. Your primary care clinician is listed as NAT Munoz 21. If you have any questions after today's visit, please call [phone].

## 2018-08-14 ENCOUNTER — HOSPITAL ENCOUNTER (OUTPATIENT)
Dept: GENERAL RADIOLOGY | Age: 78
Discharge: HOME | End: 2018-08-14
Payer: MEDICARE

## 2018-08-14 DIAGNOSIS — Q67.8 CHEST ANOMALY: ICD-10-CM

## 2018-08-14 PROCEDURE — 71046 X-RAY EXAM CHEST 2 VIEWS: CPT

## 2018-09-21 ENCOUNTER — CLINICAL SUPPORT (OUTPATIENT)
Dept: INTERNAL MEDICINE CLINIC | Age: 78
End: 2018-09-21

## 2018-09-21 DIAGNOSIS — Z23 ENCOUNTER FOR IMMUNIZATION: Primary | ICD-10-CM

## 2018-09-21 NOTE — PROGRESS NOTES
After obtaining consent, and per orders of Dr. Virginia Mandujano, injection of FluAd influenza vaccine given by Sunitha Sandhu LPN.

## 2018-12-14 ENCOUNTER — OFFICE VISIT (OUTPATIENT)
Dept: INTERNAL MEDICINE CLINIC | Age: 78
End: 2018-12-14

## 2018-12-14 VITALS
BODY MASS INDEX: 22.5 KG/M2 | HEART RATE: 62 BPM | HEIGHT: 73 IN | WEIGHT: 169.8 LBS | SYSTOLIC BLOOD PRESSURE: 126 MMHG | OXYGEN SATURATION: 98 % | DIASTOLIC BLOOD PRESSURE: 80 MMHG

## 2018-12-14 DIAGNOSIS — M54.9 MID BACK PAIN: Primary | ICD-10-CM

## 2018-12-14 RX ORDER — METHYLPREDNISOLONE 4 MG/1
TABLET ORAL
Qty: 15 TAB | Refills: 0 | Status: SHIPPED | OUTPATIENT
Start: 2018-12-14 | End: 2019-03-05 | Stop reason: ALTCHOICE

## 2018-12-14 NOTE — PROGRESS NOTES
Wong Wade is a 66 y.o. male presenting for Back Pain  . 1. Have you been to the ER, urgent care clinic since your last visit? Hospitalized since your last visit? No    2. Have you seen or consulted any other health care providers outside of the [address] [place] [address] since your last visit? Include any pap smears or colon screening. No    Fall Risk Assessment, last 12 mths 5/9/2018   Able to walk? Yes   Fall in past 12 months? No         Abuse Screening Questionnaire 5/9/2018   Do you ever feel afraid of your partner? N   Are you in a relationship with someone who physically or mentally threatens you? N   Is it safe for you to go home? Y       PHQ over the last two weeks 5/9/2018   Little interest or pleasure in doing things Not at all   Feeling down, depressed, irritable, or hopeless Not at all   Total Score PHQ 2 0       There are no discontinued medications.

## 2018-12-14 NOTE — PROGRESS NOTES
This note will not be viewable in [address] Mr. Emmanuel Small presents to the office today with complaints of back pain. The pain is been ongoing for 3 weeks. It is located in the mid back from the lower thoracic region to the upper lumbar region. The patient feels it in the middle of the spine and has had no  laterality to his pain. There is been no radicular discomfort. He notes that heat will help the discomfort. The patient is on long-term anticoagulation due to factor V Leiden mutation and is unable to take NSAID's. He has been taking Tylenol and applying liniment to the area for relief. He denies any abdominal symptoms. He has had no falls or or trauma to the area. He does go to the gym regularly and has been lifting weights. Eyes any rash.     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    Factor V Leiden mutation ([address] 10/3/2017    Gastroesophageal reflux disease without esophagitis 10/3/2017    Herpes zoster virus infection of face and ear nerves 10/3/2017    Hypercholesterolemia 10/3/2017    Long-term (current) use of anticoagulants 10/3/2017    Long-term use of high-risk medication 10/3/2017    Lumbar sprain 10/3/2017    Mid back pain 12/14/2018    Osteopenia 10/3/2017    Other ill-defined conditions(799.89)     High Cholesterol    Other ill-defined conditions(799.89)     doctor does not want him to have quinolones family -causes achilles tendonitis    Prostatic cancer ([address] 10/3/2017    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/2017    Recurrent deep vein thrombosis (DVT) ([address] 10/3/2017    Renal cell cancer ([address] 10/3/2017    Thromboembolus ([address]     DVT    Toe fracture, left 10/3/2017    Vitamin D deficiency 10/3/2017     Past Surgical History:   Procedure Laterality Date    COLORECTAL SCRN; HI RISK IND  2/3/2015         HX ORTHOPAEDIC      cortisone shot in knee and elbow    HX OTHER SURGICAL      colonoscopy    HX TONSILLECTOMY      HX UROLOGICAL Right     Kidney Removal     Allergies   Allergen Reactions    Quinolones Unknown (comments)     Current Outpatient Medications   Medication Sig Dispense Refill    methylPREDNISolone (MEDROL) 4 mg tab Take 5 tablets day one, take 4 tablets day two, take 3 tablets day three,take 2 tablets day four, take 1 tablet day five. 15 Tab 0    XARELTO 20 mg tab tablet TAKE ONE TABLET BY MOUTH DAILY 30 Tab 4    cyanocobalamin (VITAMIN B-12) 500 mcg tablet Take 500 mcg by mouth daily.  ergocalciferol (ERGOCALCIFEROL) 50,000 unit capsule Take 50,000 Units by mouth. Take one capsule on the 1st and 15th of every month      pravastatin (PRAVACHOL) 40 mg tablet TAKE ONE TABLET BY MOUTH DAILY 90 Tab 3    sildenafil (REVATIO) 20 mg tablet Take 20 mg by mouth as needed.  calcium carbonate (TUMS) 200 mg calcium (500 mg) chew Take 2 tablets by mouth daily.  calcium-cholecalciferol, d3, (CALCIUM 600 + D) 600-125 mg-unit tab Take 1 tablet by mouth daily.  ibandronate (BONIVA) 150 mg tablet Take 150 mg by mouth every thirty (30) days.  acetaminophen (TYLENOL EXTRA STRENGTH) 500 mg tablet Take 1,000 mg by mouth every six (6) hours as needed for Pain.  diphenhydrAMINE (BENADRYL) 25 mg capsule Take 25 mg by mouth every six (6) hours as needed for Itching or Sleep (allergies).        Social History     Socioeconomic History    Marital status:      Spouse name: Not on file    Number of children: Not on file    Years of education: Not on file    Highest education level: Not on file   Tobacco Use    Smoking status: Never Smoker    Smokeless tobacco: Never Used   Substance and Sexual Activity    Alcohol use: No    Drug use: No     Family History   Problem Relation Age of Onset    Hypertension Mother     Other Mother         abdominal aneurysm    Cancer Father         bladder        Review of Systems:  Gen: no fatigue, fever, chills,  Nose: no rhinorrhea, no sinus pain  Mouth: no oral lesions, no sore throat  Resp: no shortness of breath, no wheezing, no cough  CV: no chest pain, no orthopnea, no paroxysmal nocturnal dyspnea, no lower extremity edema, no palpitations  Abd: no nausea, no heartburn, no diarrhea, no constipation, no abdominal pain  Back: Positive for back pain without radiculopathy. Stiffness with ROM  Neuro: no headaches, no syncope or presyncopal episodes  Heme: no lymphadenopathy, no easy bruising or bleeding  ROS otherwise negative    Visit Vitals  /80 (BP 1 Location: Right arm, BP Patient Position: Sitting)   Pulse 62   Ht 6' 1\" (1.854 m)   Wt 169 lb 12.8 oz (77 kg)   SpO2 98%   BMI 22.40 kg/m²     Gen: alert, oriented, no acute distress  HEENT: Unremarkable  Neck: Supple without adenopathy  Resp: no increase work of breathing, lungs clear to ausculation bilaterally, no wheezing, rales or rhonchi  CV: RRR. No murmurs, rubs, or gallops. Abd: soft, not tender, not distended. No hepatosplenomegaly. Normal bowel sounds. Neuro: cranial nerves intact, normal strength and movement in all extremities, reflexes and sensation intact and symmetric. Skin: no lesion or rash  Extremities: no cyanosis or edema  Back: Lower  to palpation on affected side. Twisting ROM limited. SLR negative bilaterally. LE DTR's symmetric. Diagnoses and all orders for this visit:    Mid back pain  -     XR SPINE LUMB 2 OR 3 V; Future  -     XR SPINE THORAC 2 V; Future  -     methylPREDNISolone (MEDROL) 4 mg tab;  Take 5 tablets day one, take 4 tablets day two, take 3 tablets day three,take 2 tablets day four, take 1 tablet day five., Normal, Disp-15 Tab, R-0        Other instructions:   X-rays were reviewed and revealed some lower thoracic degenerative changes along with degenerative disc disease. Await radiologist review of x-ray    Patient will continue using moist heat and liniment on his back. Medrol Dosepak is prescribed    Have asked him to only do aerobic type of exercise and no resistant exercises until back issues have resolved    Follow-up if there is worsening    Follow-up Disposition:  Return if symptoms worsen or fail to improve.     Dwana Habermann, MD

## 2018-12-14 NOTE — PATIENT INSTRUCTIONS
Learning About Relief for Back Pain  What is back tension and strain? Back strain happens when you overstretch, or pull, a muscle in your back. You may hurt your back in an accident or when you exercise or lift something. Most back pain will get better with rest and time. You can take care of yourself at home to help your back heal.  What can you do first to relieve back pain? When you first feel back pain, try these steps:  · Walk. Take a short walk (10 to 20 minutes) on a level surface (no slopes, hills, or stairs) every 2 to 3 hours. Walk only distances you can manage without pain, especially leg pain. · Relax. Find a comfortable position for rest. Some people are comfortable on the floor or a medium-firm bed with a small pillow under their head and another under their knees. Some people prefer to lie on their side with a pillow between their knees. Don't stay in one position for too long. · Try heat or ice. Try using a heating pad on a low or medium setting, or take a warm shower, for 15 to 20 minutes every 2 to 3 hours. Or you can buy single-use heat wraps that last up to 8 hours. You can also try an ice pack for 10 to 15 minutes every 2 to 3 hours. You can use an ice pack or a bag of frozen vegetables wrapped in a thin towel. There is not strong evidence that either heat or ice will help, but you can try them to see if they help. You may also want to try switching between heat and cold. · Take pain medicine exactly as directed. ? If the doctor gave you a prescription medicine for pain, take it as prescribed. ? If you are not taking a prescription pain medicine, ask your doctor if you can take an over-the-counter medicine. What else can you do? · Stretch and exercise. Exercises that increase flexibility may relieve your pain and make it easier for your muscles to keep your spine in a good, neutral position. And don't forget to keep walking. · Do self-massage.  You can use self-massage to unwind after work or school or to energize yourself in the morning. You can easily massage your feet, hands, or neck. Self-massage works best if you are in comfortable clothes and are sitting or lying in a comfortable position. Use oil or lotion to massage bare skin. · Reduce stress. Back pain can lead to a vicious [place]: Distress about the pain tenses the muscles in your back, which in turn causes more pain. Learn how to relax your mind and your muscles to lower your stress. Where can you learn more? Go to http://kenna-lisa.info/. Enter G218 in the search box to learn more about \"Learning About Relief for Back Pain. \"  Current as of: November 29, 2017  Content Version: 11.8  © 1752-9665 [employer]. Care instructions adapted under license by [employer] (which disclaims liability or warranty for this information). If you have questions about a medical condition or this instruction, always ask your healthcare professional. [place] [address] any warranty or liability for your use of this information.

## 2019-01-01 RX ORDER — RIVAROXABAN 20 MG/1
TABLET, FILM COATED ORAL
Qty: 30 TAB | Refills: 3 | Status: SHIPPED | OUTPATIENT
Start: 2019-01-01 | End: 2019-05-03 | Stop reason: SDUPTHER

## 2019-01-19 RX ORDER — PRAVASTATIN SODIUM 40 MG/1
TABLET ORAL
Qty: 90 TAB | Refills: 2 | Status: SHIPPED | OUTPATIENT
Start: 2019-01-19 | End: 2019-10-22 | Stop reason: SDUPTHER

## 2019-03-05 ENCOUNTER — OFFICE VISIT (OUTPATIENT)
Dept: INTERNAL MEDICINE CLINIC | Age: 79
End: 2019-03-05

## 2019-03-05 VITALS
BODY MASS INDEX: 23.14 KG/M2 | SYSTOLIC BLOOD PRESSURE: 120 MMHG | DIASTOLIC BLOOD PRESSURE: 74 MMHG | TEMPERATURE: 97.5 F | HEART RATE: 63 BPM | HEIGHT: 73 IN | RESPIRATION RATE: 18 BRPM | OXYGEN SATURATION: 95 % | WEIGHT: 174.6 LBS

## 2019-03-05 DIAGNOSIS — D68.51 FACTOR V LEIDEN MUTATION (HCC): Chronic | ICD-10-CM

## 2019-03-05 DIAGNOSIS — E55.9 VITAMIN D DEFICIENCY: Chronic | ICD-10-CM

## 2019-03-05 DIAGNOSIS — Z79.899 LONG-TERM USE OF HIGH-RISK MEDICATION: Chronic | ICD-10-CM

## 2019-03-05 DIAGNOSIS — C64.9 RENAL CELL CARCINOMA, UNSPECIFIED LATERALITY (HCC): ICD-10-CM

## 2019-03-05 DIAGNOSIS — Z79.01 LONG TERM CURRENT USE OF ANTICOAGULANT THERAPY: Chronic | ICD-10-CM

## 2019-03-05 DIAGNOSIS — E78.00 HYPERCHOLESTEROLEMIA: Primary | Chronic | ICD-10-CM

## 2019-03-05 DIAGNOSIS — C61 PROSTATIC CANCER (HCC): ICD-10-CM

## 2019-03-05 LAB
25(OH)D3 SERPL-MCNC: 34 NG/ML (ref 30–96)
A-G RATIO,AGRAT: 1.7 RATIO
ALBUMIN SERPL-MCNC: 4.3 G/DL (ref 3.9–5.4)
ALP SERPL-CCNC: 58 U/L (ref 38–126)
ALT SERPL-CCNC: 18 U/L (ref 9–52)
ANION GAP SERPL CALC-SCNC: 10 MMOL/L
AST SERPL W P-5'-P-CCNC: 21 U/L (ref 14–36)
BILIRUB SERPL-MCNC: 0.6 MG/DL (ref 0.2–1.3)
BUN SERPL-MCNC: 27 MG/DL (ref 9–20)
BUN/CREATININE RATIO,BUCR: 27 RATIO
CALCIUM SERPL-MCNC: 10.1 MG/DL (ref 8.4–10.2)
CHLORIDE SERPL-SCNC: 102 MMOL/L (ref 98–107)
CHOL/HDL RATIO,CHHD: 3 RATIO (ref 0–4)
CHOLEST SERPL-MCNC: 155 MG/DL (ref 0–200)
CO2 SERPL-SCNC: 29 MMOL/L (ref 22–32)
CREAT SERPL-MCNC: 1 MG/DL (ref 0.8–1.5)
ERYTHROCYTE [DISTWIDTH] IN BLOOD BY AUTOMATED COUNT: 13.9 %
GLOBULIN,GLOB: 2.5
GLUCOSE SERPL-MCNC: 109 MG/DL (ref 75–110)
HCT VFR BLD AUTO: 45.1 % (ref 37–51)
HDLC SERPL-MCNC: 62 MG/DL (ref 35–130)
HGB BLD-MCNC: 15.3 G/DL (ref 12–18)
LDL/HDL RATIO,LDHD: 1 RATIO
LDLC SERPL CALC-MCNC: 62 MG/DL (ref 0–130)
LYMPHOCYTES ABSOLUTE: 1.8 K/UL (ref 0.6–4.1)
LYMPHOCYTES NFR BLD: 29.6 % (ref 10–58.5)
MCH RBC QN AUTO: 30 PG (ref 26–32)
MCHC RBC AUTO-ENTMCNC: 33.9 G/DL (ref 30–36)
MCV RBC AUTO: 88.5 FL (ref 80–97)
MONOCYTES ABS-DIF,2141: 0.4 K/UL (ref 0–1.8)
MONOCYTES NFR BLD: 7.3 % (ref 0.1–24)
NEUTROPHILS # BLD: 63.1 % (ref 37–92)
NEUTROPHILS ABS,2156: 3.8 K/UL (ref 2–7.8)
PLATELET # BLD AUTO: 175 K/UL (ref 140–440)
PMV BLD AUTO: 8.3 FL
POTASSIUM SERPL-SCNC: 4.3 MMOL/L (ref 3.6–5)
PROT SERPL-MCNC: 6.8 G/DL (ref 6.3–8.2)
RBC # BLD AUTO: 5.1 M/UL (ref 4.2–6.3)
SODIUM SERPL-SCNC: 141 MMOL/L (ref 137–145)
TRIGL SERPL-MCNC: 156 MG/DL (ref 0–200)
TSH SERPL DL<=0.05 MIU/L-ACNC: 2.08 UIU/ML (ref 0.34–5.6)
VLDLC SERPL CALC-MCNC: 31 MG/DL
WBC # BLD AUTO: 6.1 K/UL (ref 4.1–10.9)

## 2019-03-05 RX ORDER — OXYBUTYNIN CHLORIDE 10 MG/1
TABLET, EXTENDED RELEASE ORAL
COMMUNITY
Start: 2019-02-26 | End: 2020-11-02 | Stop reason: ALTCHOICE

## 2019-03-05 NOTE — PATIENT INSTRUCTIONS
Clotting Factor Deficiencies: Care Instructions  Your Care Instructions    Clotting factors are substances in the blood that help stop bleeding after a cut or injury. They also prevent sudden bleeding. In people who have clotting factor problems, the clotting factors don't work right or, in some cases, are missing. When blood does not clot well, even minor injuries can cause serious bleeding. This can lead to blood loss, injury to internal organs, or damage to muscles or joints. Several conditions, including hemophilia, can make it hard for the blood to clot. Your doctor can treat you by giving you replacement clotting factors. You also may take medicine to prevent bleeding. You may often have clotting factors transfused into a vein to prevent bleeding, or you may get them as needed. You may eventually learn to do this at home. You can also try to prevent injuries that can cause you to bleed. Follow-up care is a key part of your treatment and safety. Be sure to make and go to all appointments, and call your doctor if you are having problems. It's also a good idea to know your test results and keep a list of the medicines you take. How can you care for yourself at home? · Take your medicines exactly as prescribed. Call your doctor if you think you are having a problem with your medicine. You will get more details on the specific medicines your doctor prescribes. · Stay at a healthy body weight. If you are overweight, the additional stress on joints can trigger bleeding. · Exercise safely. Avoid contact sports. Swim or walk to avoid excess pressure on your joints. Check with your doctor before doing activities that put you at high risk for falls, such as riding a bike. · Brush and floss your teeth daily. This may help you avoid problems that could lead to having a tooth pulled. · Avoid aspirin and nonsteroidal anti-inflammatory drugs (NSAIDs), such as ibuprofen (Advil, Motrin) or naproxen (Aleve).  They can increase the chance of bleeding. · Take pain medicines exactly as directed. ? If the doctor gave you a prescription medicine for pain, take it as prescribed. ? If you are not taking a prescription pain medicine, ask your doctor if you can take an over-the-counter medicine. · Take care to prevent accidents at home:  ? Make sure rugs are tacked down so you do not slip. ? Keep furniture with sharp edges out of pathways. ? Use nonskid floor wax. ? Wipe up spills quickly. ? If you live in an area that gets snow and ice in the winter, sprinkle salt on steps and sidewalks. ? Avoid loose-fitting shoes. You might lose your balance and fall. · Wear medical alert jewelry that lists your clotting problem. You can buy this at most drugstores. When should you call for help? Call 911 anytime you think you may need emergency care. For example, call if:    · You passed out (lost consciousness).     · You have signs of severe bleeding, which includes:  ? You have a severe headache that is different from past headaches. ? You vomit blood or what looks like coffee grounds. ? Your stools are maroon or very bloody.    Call your doctor now or seek immediate medical care if:    · You are dizzy or lightheaded, or you feel like you may faint.     · You have abnormal bleeding, such as:  ? A nosebleed that you can't easily stop. ? Your stools are black and look like tar, or they have streaks of blood. ? You have blood in your urine. ? You have joint pain. ? You have bruises or blood spots under your skin.    Watch closely for changes in your health, and be sure to contact your doctor if:    · You do not get better as expected. Where can you learn more? Go to http://kenna-lisa.info/. Enter K352 in the search box to learn more about \"Clotting Factor Deficiencies: Care Instructions. \"  Current as of: May 6, 2018  Content Version: 11.9  © 8022-9523 [employer].  Care instructions adapted under license by [employer] (which disclaims liability or warranty for this information). If you have questions about a medical condition or this instruction, always ask your healthcare professional. [address] any warranty or liability for your use of this information. Factor V Leiden: Care Instructions  Your Care Instructions    Factor V Leiden is the most common inherited condition that causes an increase in blood clotting. It increases the chances that your blood will form abnormal blood clots that can be dangerous. Clots can form in the veins near your bones that carry a lot of blood (deep veins), a condition called deep vein thrombosis. A clot can travel through the blood to a lung and cause a serious problem called a pulmonary embolism. Many things can increase the chance of blood clots, including smoking and not being able to walk or move around for a long time. Your treatment may include a medicine (called a blood thinner) that prevents blood clots. A healthy lifestyle also can lower your risk of a blood clot. Factor V Leiden is caused by a faulty gene that you inherit from one or both parents. Talk with your doctor about whether other people in your family should be tested for the faulty gene. Follow-up care is a key part of your treatment and safety. Be sure to make and go to all appointments, and call your doctor if you are having problems. It's also a good idea to know your test results and keep a list of the medicines you take. How can you care for yourself at home? · Take your medicines exactly as prescribed. Call your doctor if you think you are having a problem with your medicine. · If you take a blood thinner, be sure you get instructions about how to take your medicine safely. Blood thinners can cause serious bleeding problems. · Stay at a healthy weight. · Eat a balanced diet that is low in fat.   · Get at least 30 minutes of exercise on most days of the week. Walking is a good choice. You also may want to do other activities, such as running, swimming, cycling, or playing tennis or team sports. · Try not to sit or lie down for long periods. If you are in bed at home recovering from an injury or surgery, ask your doctor how often you should move around or do exercises. If you are on a long car trip, stop every hour or so. Get out and walk around for a few minutes. If you are traveling by bus, train, or plane, walk up and down the aisle every hour or so. · Some medicines, such as birth control pills, hormones, antibiotics, celecoxib (Celebrex), and raloxifene (Evista), can increase your chances of a blood clot. Check with your doctor before taking any medicines. · Do not smoke. It can increase the risk of blood clots. If you need help quitting, talk to your doctor about stop-smoking programs and medicines. These can increase your chances of quitting for good. · Let doctors you see know that you have factor V Leiden. · Wear medical alert jewelry that lists your clotting problem. You can buy it online or at most "[employer]. When should you call for help? Call 911 anytime you think you may need emergency care. For example, call if:    · You have sudden chest pain and shortness of breath.     · You cough up blood.    Call your doctor now or seek immediate medical care if:    · You have signs of a blood clot, such as:  ? Pain in your calf, back of the knee, thigh, or groin. ? Redness and swelling in your leg or groin.    Watch closely for changes in your health, and be sure to contact your doctor if you have any problems. Where can you learn more? Go to http://kenna-lisa.info/. Enter B034 in the search box to learn more about \"Factor V Leiden: Care Instructions. \"  Current as of: September 26, 2018  Content Version: 11.9  © 9730-5950 [employer], [employer].  Care instructions adapted under license by [employer] (which disclaims liability or warranty for this information). If you have questions about a medical condition or this instruction, always ask your healthcare professional. [address] any warranty or liability for your use of this information.

## 2019-03-05 NOTE — PROGRESS NOTES
James Wild is a 66 y.o. male presenting for Cholesterol Problem (6 mo fu)  . 1. Have you been to the ER, urgent care clinic since your last visit? Hospitalized since your last visit? No    2. Have you seen or consulted any other health care providers outside of the [address] [place] [address] since your last visit? Include any pap smears or colon screening. Yes When: 2-26-19 Where: Dr Jaquan Azul Reason for visit: overactive bladder. Fall Risk Assessment, last 12 mths 3/5/2019   Able to walk? Yes   Fall in past 12 months? No         Abuse Screening Questionnaire 3/5/2019   Do you ever feel afraid of your partner? N   Are you in a relationship with someone who physically or mentally threatens you? N   Is it safe for you to go home? Y       3 most recent PHQ Screens 3/5/2019   Little interest or pleasure in doing things Not at all   Feeling down, depressed, irritable, or hopeless Not at all   Total Score PHQ 2 0       There are no discontinued medications.

## 2019-03-05 NOTE — PROGRESS NOTES
This note will not be viewable in [address] Blessing Nolen is a 66 y.o. male and presents with Cholesterol Problem (6 mo fu)  . Subjective:  Mr. Neymar Lara returns to the office today in follow-up of multiple medical problems. The patient has hypercholesterolemia for which she is on pravastatin therapy. He continues to tolerate this without muscle soreness or GI upset. There is no history of coronary artery disease and he denies exertional chest pains or claudication. The patient has factor V Leiden mutation and has had recurrent blood clots. He is currently on daily Xarelto which he tolerates well and has had no problems in regards to bleeding and no recurrence of blood clots. Patient has osteopenia as well as vitamin D deficiency. Currently he is on monthly Boniva along with calcium and vitamin D supplements. He tolerates this without GI upset. He has had no falls or fractures. He continues with follow-up with Dr. Hermine Soulier regarding his prostate and renal cell carcinoma. He is followed on a every 6 month basis. He recently has developed some overactive bladder and was placed on oxybutynin.     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    Factor V Leiden mutation ([facility] 10/3/2017    Gastroesophageal reflux disease without esophagitis 10/3/2017    Herpes zoster virus infection of face and ear nerves 10/3/2017    Hypercholesterolemia 10/3/2017    Long-term (current) use of anticoagulants 10/3/2017    Long-term use of high-risk medication 10/3/2017    Lumbar sprain 10/3/2017    Mid back pain 12/14/2018    Osteopenia 10/3/2017    Other ill-defined conditions(799.89)     High Cholesterol    Other ill-defined conditions(799.89)     doctor does not want him to have quinolones family -causes achilles tendonitis    Prostatic cancer ([facility] [address] 10/3/2017    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/2017    Recurrent deep vein thrombosis (DVT) (HCC) 10/3/2017    Renal cell cancer ([facility] 10/3/2017    Thromboembolus ([facility] [address]     DVT    Toe fracture, left 10/3/2017    Vitamin D deficiency 10/3/2017     Past Surgical History:   Procedure Laterality Date    COLORECTAL SCRN; HI RISK IND  2/3/2015         HX ORTHOPAEDIC      cortisone shot in knee and elbow    HX OTHER SURGICAL      colonoscopy    HX TONSILLECTOMY      HX UROLOGICAL Right     Kidney Removal     Allergies   Allergen Reactions    Quinolones Unknown (comments)     Current Outpatient Medications   Medication Sig Dispense Refill    oxybutynin chloride XL (DITROPAN XL) 10 mg CR tablet       pravastatin (PRAVACHOL) 40 mg tablet TAKE ONE TABLET BY MOUTH DAILY 90 Tab 2    XARELTO 20 mg tab tablet TAKE ONE TABLET BY MOUTH DAILY 30 Tab 3    cyanocobalamin (VITAMIN B-12) 500 mcg tablet Take 500 mcg by mouth daily.  ergocalciferol (ERGOCALCIFEROL) 50,000 unit capsule Take 50,000 Units by mouth. Take one capsule on the 1st and 15th of every month      sildenafil (REVATIO) 20 mg tablet Take 20 mg by mouth as needed.  calcium carbonate (TUMS) 200 mg calcium (500 mg) chew Take 2 tablets by mouth daily.  calcium-cholecalciferol, d3, (CALCIUM 600 + D) 600-125 mg-unit tab Take 1 tablet by mouth daily.  ibandronate (BONIVA) 150 mg tablet Take 150 mg by mouth every thirty (30) days.  acetaminophen (TYLENOL EXTRA STRENGTH) 500 mg tablet Take 1,000 mg by mouth every six (6) hours as needed for Pain.  diphenhydrAMINE (BENADRYL) 25 mg capsule Take 25 mg by mouth every six (6) hours as needed for Itching or Sleep (allergies).        Social History     Socioeconomic History    Marital status:  Spouse name: Not on file    Number of children: Not on file    Years of education: Not on file    Highest education level: Not on file   Tobacco Use    Smoking status: Never Smoker    Smokeless tobacco: Never Used   Substance and Sexual Activity    Alcohol use: No    Drug use: No     Family History   Problem Relation Age of Onset    Hypertension Mother    [employer] Other Mother         abdominal aneurysm    Cancer Father         bladder        Health Maintenance   Topic Date Due    Shingrix Vaccine Age 49> (1 of 2) 09/05/1990    GLAUCOMA SCREENING Q2Y  09/05/2005    MEDICARE YEARLY EXAM  08/03/2019    DTaP/Tdap/Td series (2 - Td) 08/02/2028    Pneumococcal 65+ Low/Medium Risk  Completed    Influenza Age 5 to Adult  Completed        Review of Systems  Constitutional: negative for fevers, chills, anorexia and weight loss  Eyes:   negative for visual disturbance and irritation  ENT:   negative for tinnitus,sore throat,nasal congestion,ear pain,hoarseness  Respiratory:  negative for cough, hemoptysis, dyspnea,wheezing  CV:   negative for chest pain, palpitations, lower extremity edema  GI:   negative for nausea, vomiting, diarrhea, abdominal pain,melena  Endo:               negative for polyuria,polydipsia,polyphagia,heat intolerance  Genitourinary: negative for frequency, dysuria and hematuria  Integumentary: negative for rash and pruritus  Hematologic:  negative for easy bruising and gum/nose bleeding  Musculoskel: negative for myalgias, arthralgias, back pain, muscle weakness, joint pain  Neurological:  negative for headaches, dizziness, vertigo, memory problems and gait   Behavl/Psych: negative for feelings of anxiety, depression, mood changes  ROS otherwise negative      Objective:  Visit Vitals  /74   Pulse 63   Temp 97.5 °F (36.4 °C) (Oral)   Resp 18   Ht 6' 1\" (1.854 m)   Wt 174 lb 9.6 oz (79.2 kg)   SpO2 95%   BMI 23.04 kg/m²     Body mass index is 23.04 kg/m².     Physical Exam:   General appearance - alert, well appearing, and in no distress  Mental status - alert, oriented to person, place, and time  EYE-BLAS, EOMI,conjunctiva normal bilaterally, lids normal  ENT-ENT exam normal, no neck nodes or sinus tenderness  Nose - normal and patent, no erythema,  Or discharge   Mouth - mucous membranes moist, pharynx normal without lesions  Neck - supple, no significant adenopathy or bruit  Chest - clear to auscultation, no wheezes, rales or rhonchi. Heart - normal rate, regular rhythm, normal S1, S2, no murmurs, rubs, clicks or gallops   Abdomen - soft, nontender, nondistended, no masses or organomegaly  Lymph- no adenopathy palpable  Ext-peripheral pulses normal, no pedal edema, no clubbing or cyanosis  Skin-Warm and dry. no hyperpigmentation, vitiligo, or suspicious lesions  Neuro -alert, oriented, normal speech, no focal findings or movement disorder noted      Assessment/Plan:  Diagnoses and all orders for this visit:    Hypercholesterolemia  -     COLLECTION VENOUS BLOOD,VENIPUNCTURE  -     LIPID PANEL  -     TSH 3RD GENERATION    Long term current use of anticoagulant therapy  -     CBC WITH AUTOMATED DIFF  -     METABOLIC PANEL, COMPREHENSIVE    Long-term use of high-risk medication    Factor V Leiden mutation (HCC)    Renal cell carcinoma, unspecified laterality ([facility] [address]    Prostatic cancer ([facility] [address]    Vitamin D deficiency  -     VITAMIN D, 25 HYDROXY        Other instructions: The patient's medications were reviewed and reconciled. No change in his current medical regimen is made. A no added salt, prudent diet is encouraged    Await results of multiple labs    Follow-up 6 months    Follow-up Disposition:  Return in about 6 months (around 9/5/2019). I have reviewed with the patient details of the assessment and plan and all questions were answered. Relevent patient education was performed. The most recent lab findings were reviewed with the patient.     An After Visit Summary was printed and given to the patient.     Uyen Miller MD

## 2019-05-03 RX ORDER — RIVAROXABAN 20 MG/1
TABLET, FILM COATED ORAL
Qty: 30 TAB | Refills: 2 | Status: SHIPPED | OUTPATIENT
Start: 2019-05-03 | End: 2019-07-29 | Stop reason: SDUPTHER

## 2019-07-29 RX ORDER — RIVAROXABAN 20 MG/1
TABLET, FILM COATED ORAL
Qty: 30 TAB | Refills: 1 | Status: SHIPPED | OUTPATIENT
Start: 2019-07-29 | End: 2019-10-01 | Stop reason: SDUPTHER

## 2019-09-03 ENCOUNTER — HOSPITAL ENCOUNTER (OUTPATIENT)
Dept: GENERAL RADIOLOGY | Age: 79
Discharge: HOME | End: 2019-09-03
Payer: MEDICARE

## 2019-09-03 DIAGNOSIS — C64.9: ICD-10-CM

## 2019-09-03 PROCEDURE — 71046 X-RAY EXAM CHEST 2 VIEWS: CPT

## 2019-09-12 ENCOUNTER — OFFICE VISIT (OUTPATIENT)
Dept: INTERNAL MEDICINE CLINIC | Age: 79
End: 2019-09-12

## 2019-09-12 VITALS
OXYGEN SATURATION: 97 % | HEIGHT: 73 IN | TEMPERATURE: 97.6 F | SYSTOLIC BLOOD PRESSURE: 124 MMHG | HEART RATE: 60 BPM | RESPIRATION RATE: 16 BRPM | BODY MASS INDEX: 22.61 KG/M2 | DIASTOLIC BLOOD PRESSURE: 82 MMHG | WEIGHT: 170.6 LBS

## 2019-09-12 DIAGNOSIS — D68.51 FACTOR V LEIDEN MUTATION (HCC): Chronic | ICD-10-CM

## 2019-09-12 DIAGNOSIS — M54.9 MID BACK PAIN: ICD-10-CM

## 2019-09-12 DIAGNOSIS — C64.9 RENAL CELL CARCINOMA, UNSPECIFIED LATERALITY (HCC): ICD-10-CM

## 2019-09-12 DIAGNOSIS — Z79.01 LONG TERM CURRENT USE OF ANTICOAGULANT THERAPY: Chronic | ICD-10-CM

## 2019-09-12 DIAGNOSIS — E55.9 VITAMIN D DEFICIENCY: Chronic | ICD-10-CM

## 2019-09-12 DIAGNOSIS — Z79.899 LONG-TERM USE OF HIGH-RISK MEDICATION: Chronic | ICD-10-CM

## 2019-09-12 DIAGNOSIS — M85.80 OSTEOPENIA, UNSPECIFIED LOCATION: ICD-10-CM

## 2019-09-12 DIAGNOSIS — C61 PROSTATIC CANCER (HCC): ICD-10-CM

## 2019-09-12 DIAGNOSIS — I82.409 RECURRENT DEEP VEIN THROMBOSIS (DVT) (HCC): ICD-10-CM

## 2019-09-12 DIAGNOSIS — Z00.00 MEDICARE ANNUAL WELLNESS VISIT, SUBSEQUENT: Primary | ICD-10-CM

## 2019-09-12 DIAGNOSIS — E78.00 HYPERCHOLESTEROLEMIA: Chronic | ICD-10-CM

## 2019-09-12 LAB
A-G RATIO,AGRAT: 1.7 RATIO
ALBUMIN SERPL-MCNC: 4.3 G/DL (ref 3.9–5.4)
ALP SERPL-CCNC: 64 U/L (ref 38–126)
ALT SERPL-CCNC: 16 U/L (ref 0–50)
ANION GAP SERPL CALC-SCNC: 9 MMOL/L
AST SERPL W P-5'-P-CCNC: 22 U/L (ref 14–36)
BILIRUB SERPL-MCNC: 0.8 MG/DL (ref 0.2–1.3)
BUN SERPL-MCNC: 22 MG/DL (ref 9–20)
BUN/CREATININE RATIO,BUCR: 22 RATIO
CALCIUM SERPL-MCNC: 9.7 MG/DL (ref 8.4–10.2)
CHLORIDE SERPL-SCNC: 104 MMOL/L (ref 98–107)
CHOL/HDL RATIO,CHHD: 2 RATIO (ref 0–4)
CHOLEST SERPL-MCNC: 169 MG/DL (ref 0–200)
CK SERPL-CCNC: 57 U/L (ref 30–135)
CO2 SERPL-SCNC: 30 MMOL/L (ref 22–32)
CREAT SERPL-MCNC: 1 MG/DL (ref 0.8–1.5)
GLOBULIN,GLOB: 2.6
GLUCOSE SERPL-MCNC: 89 MG/DL (ref 75–110)
HDLC SERPL-MCNC: 80 MG/DL (ref 35–130)
LDL/HDL RATIO,LDHD: 1 RATIO
LDLC SERPL CALC-MCNC: 74 MG/DL (ref 0–130)
POTASSIUM SERPL-SCNC: 4.2 MMOL/L (ref 3.6–5)
PROT SERPL-MCNC: 6.9 G/DL (ref 6.3–8.2)
SODIUM SERPL-SCNC: 143 MMOL/L (ref 137–145)
TRIGL SERPL-MCNC: 77 MG/DL (ref 0–200)
VLDLC SERPL CALC-MCNC: 15 MG/DL

## 2019-09-12 RX ORDER — CYCLOBENZAPRINE HCL 10 MG
10 TABLET ORAL
Qty: 30 TAB | Refills: 0 | Status: SHIPPED | OUTPATIENT
Start: 2019-09-12 | End: 2020-05-01 | Stop reason: ALTCHOICE

## 2019-09-12 NOTE — PATIENT INSTRUCTIONS
The best way to stay healthy is to live a healthy lifestyle. A healthy lifestyle includes regular exercise, eating a well-balanced diet, keeping a healthy weight and not smoking. Regular physical exams and screening tests are another important way to take care of yourself. Preventive exams provided by health care providers can find health problems early when treatment works best and can keep you from getting certain diseases or illnesses. Preventive services include exams, lab tests, screenings, shots, monitoring and information to help you take care of your own health. All people over 65 should have a pneumonia shot. Pneumonia shots are usually only needed once in a lifetime unless your doctor decides differently. In addition to your physical exam, some screening tests are recommended:    All people over 65 should have a yearly flu shot. People over 65 are at medium to high risk for Hepatitis B. Three shots are needed for complete protection. Bone mass measurement (dexa scan) is recommended every two years. Diabetes Mellitus screening is recommended every year. Glaucoma is an eye disease caused by high pressure in the eye. An eye exam is recommended every year. Cardiovascular screening tests that check your cholesterol and other blood fat (lipid) levels are recommended every five years. Colorectal Cancer screening tests help to find pre-cancerous polyps (growths in the colon) so they can be removed before they turn into cancer. Tests ordered for screening depend on your personal and family history risk factors. Prostate Cancer Screening (annually up to age 76)    Screening for breast cancer is recommended yearly with a Mammogram.    Screening for cervical and vaginal cancer is recommended with a pelvic and Pap test every two years.  However if you have had an abnormal pap in the past  three years or at high risk for cervical or vaginal cancer Medicare will cover a pap test and a pelvic exam every year. Here is a list of your current Health Maintenance items with a due date:  Health Maintenance Due   Topic Date Due    Glaucoma Screening   09/05/2005    Flu Vaccine  08/01/2019    Annual Well Visit  08/03/2019          Learning About How to Have a Healthy Back  What causes back pain? Back pain is often caused by overuse, strain, or injury. For example, people often hurt their backs playing sports or working in the yard, being jolted in a car accident, or lifting something too heavy. Aging plays a part too. Your bones and muscles tend to lose strength as you age, which makes injury more likely. The spongy discs between the bones of the spine (vertebrae) may suffer from wear and tear and no longer provide enough cushion between the bones. A disc that bulges or breaks open (herniated disc) can press on nerves, causing back pain. In some people, back pain is the result of arthritis, broken vertebrae caused by bone loss (osteoporosis), illness, or a spine problem. Although most people have back pain at one time or another, there are steps you can take to make it less likely. How can you have a healthy back? Reduce stress on your back through good posture  Slumping or slouching alone may not cause low back pain. But after the back has been strained or injured, bad posture can make pain worse. · Sleep in a position that maintains your back's normal curves and on a mattress that feels comfortable. Sleep on your side with a pillow between your knees, or sleep on your back with a pillow under your knees. These positions can reduce strain on your back. · Stand and sit up straight. \"Good posture\" generally means your ears, shoulders, and hips are in a straight line. · If you must stand for a long time, put one foot on a stool, ledge, or box. Switch feet every now and then.   · Sit in a chair that is low enough to let you place both feet flat on the floor with both knees nearly level with your hips. If your chair or desk is too high, use a footrest to raise your knees. Place a small pillow, a rolled-up towel, or a lumbar roll in the curve of your back if you need extra support. · Try a kneeling chair, which helps tilt your hips forward. This takes pressure off your lower back. · Try sitting on an exercise ball. It can rock from side to side, which helps keep your back loose. · When driving, keep your knees nearly level with your hips. Sit straight, and drive with both hands on the steering wheel. Your arms should be in a slightly bent position. Reduce stress on your back through careful lifting  · Squat down, bending at the hips and knees only. If you need to, put one knee to the floor and extend your other knee in front of you, bent at a right angle (half kneeling). · Press your chest straight forward. This helps keep your upper back straight while keeping a slight arch in your low back. · Hold the load as close to your body as possible, at the level of your belly button (navel). · Use your feet to change direction, taking small steps. · Lead with your hips as you change direction. Keep your shoulders in line with your hips as you move. · Set down your load carefully, squatting with your knees and hips only. Exercise and stretch your back  · Do some exercise on most days of the week, if your doctor says it is okay. You can walk, run, swim, or cycle. · Stretch your back muscles. Here are a few exercises to try:  ? Lie on your back, and gently pull one bent knee to your chest. Put that foot back on the floor, and then pull the other knee to your chest.  ? Do pelvic tilts. Lie on your back with your knees bent. Tighten your stomach muscles. Pull your belly button (navel) in and up toward your ribs. You should feel like your back is pressing to the floor and your hips and pelvis are slightly lifting off the floor. Hold for 6 seconds while breathing smoothly. ?  Sit with your back flat against a wall. · Keep your core muscles strong. The muscles of your back, belly (abdomen), and buttocks support your spine. ? Pull in your belly and imagine pulling your navel toward your spine. Hold this for 6 seconds, then relax. Remember to keep breathing normally as you tense your muscles. ? Do curl-ups. Always do them with your knees bent. Keep your low back on the floor, and curl your shoulders toward your knees using a smooth, slow motion. Keep your arms folded across your chest. If this bothers your neck, try putting your hands behind your neck (not your head), with your elbows spread apart. ? Lie on your back with your knees bent and your feet flat on the floor. Tighten your belly muscles, and then push with your feet and raise your buttocks up a few inches. Hold this position 6 seconds as you continue to breathe normally, then lower yourself slowly to the floor. Repeat 8 to 12 times. ? If you like group exercise, try Pilates or yoga. These classes have poses that strengthen the core muscles. Lead a healthy lifestyle  · Stay at a healthy weight to avoid strain on your back. · Do not smoke. Smoking increases the risk of osteoporosis, which weakens the spine. If you need help quitting, talk to your doctor about stop-smoking programs and medicines. These can increase your chances of quitting for good. Where can you learn more? Go to http://kenna-lisa.info/. Enter L315 in the search box to learn more about \"Learning About How to Have a Healthy Back. \"  Current as of: September 20, 2018  Content Version: 12.1  © 3220-5888 [employer]. Care instructions adapted under license by [employer] (which disclaims liability or warranty for this information). If you have questions about a medical condition or this instruction, always ask your healthcare professional. [address] any warranty or liability for your use of this information.

## 2019-09-12 NOTE — PROGRESS NOTES
Chief Complaint   Patient presents with    Cholesterol Problem     6 mo fu    Annual Wellness Visit       Depression Risk Factor Screening:     3 most recent PHQ Screens 3/5/2019   Little interest or pleasure in doing things Not at all   Feeling down, depressed, irritable, or hopeless Not at all   Total Score PHQ 2 0       Functional Ability and Level of Safety:     Activities of Daily Living  ADL Assessment 3/5/2019   Feeding yourself No Help Needed   Getting from bed to chair No Help Needed   Getting dressed No Help Needed   Bathing or showering No Help Needed   Walk across the room (includes cane/walker) No Help Needed   Using the telphone No Help Needed   Taking your medications No Help Needed   Preparing meals No Help Needed   Managing money (expenses/bills) No Help Needed   Moderately strenuous housework (laundry) No Help Needed   Shopping for personal items (toiletries/medicines) No Help Needed   Shopping for groceries No Help Needed   Driving No Help Needed   Climbing a flight of stairs No Help Needed   Getting to places beyond walking distances No Help Needed       Fall Risk  Fall Risk Assessment, last 12 mths 3/5/2019   Able to walk? Yes   Fall in past 12 months? No       Abuse Screen  Abuse Screening Questionnaire 3/5/2019   Do you ever feel afraid of your partner? N   Are you in a relationship with someone who physically or mentally threatens you? N   Is it safe for you to go home?  Y         Patient Care Team   Patient Care Team:  Thai Quesada MD as PCP - General (Internal Medicine)  Ralf Shelton MD (Endocrinology)  Vickie Arreaga MD (Urology)  Edson Orellana DPM (Podiatry)

## 2019-09-12 NOTE — PROGRESS NOTES
This note will not be viewable in [address] Rita Villalta is a 78 y.o. male and presents with Cholesterol Problem (6 mo fu) and Annual Wellness Visit  . Subjective:  Mr. Jean Marie Kang is a 60-year-old  who presents to the office today for Medicare wellness check and follow-up of multiple medical problems. The patient has hypercholesterolemia and remains on pravastatin therapy. He is tolerating this without muscle soreness or GI upset. He has no coronary artery disease and remains physically active. He denies any exertional chest pains or claudication. He has a history of recurrent DVT secondary to factor V Leiden mutation. The patient is on chronic Xarelto. The patient has had no leg swelling or pain and he has had no bleeding problems related to the Xarelto. The patient has osteopenia and vitamin D deficiency. He previously had been on Boniva but is on calcium and vitamin D at the present time. He has had no falls or fractures. He has a history of prostate cancer and renal cell cancer. He was seen by Dr. Tyson Montague earlier this week for his urology follow-up. He is followed on a every 6 month basis and has had no recurrence of his illness. The patient does complain of some recurrent mid back pain. This is been musculoskeletal and is been helped in the past by Flexeril which she would like to have a refill of. He denies any radicular discomfort.     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    Factor V Leiden mutation ([facility] 10/3/2017    Gastroesophageal reflux disease without esophagitis 10/3/2017    Herpes zoster virus infection of face and ear nerves 10/3/2017    Hypercholesterolemia 10/3/2017    Long-term (current) use of anticoagulants 10/3/2017    Long-term use of high-risk medication 10/3/2017    Lumbar sprain 10/3/2017    Mid back pain 12/14/2018    Osteopenia 10/3/2017    Other ill-defined conditions(799.89)     High Cholesterol    Other ill-defined conditions(799.89)     doctor does not want him to have quinolones family -causes achilles tendonitis    Prostatic cancer ([facility] 10/3/2017    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/2017    Recurrent deep vein thrombosis (DVT) ([facility] 10/3/2017    Renal cell cancer ([facility] 10/3/2017    Thromboembolus ([facility]     DVT    Toe fracture, left 10/3/2017    Vitamin D deficiency 10/3/2017     Past Surgical History:   Procedure Laterality Date    COLORECTAL SCRN; HI RISK IND  2/3/2015         HX ORTHOPAEDIC      cortisone shot in knee and elbow    HX OTHER SURGICAL      colonoscopy    HX TONSILLECTOMY      HX UROLOGICAL Right     Kidney Removal     Allergies   Allergen Reactions    Quinolones Unknown (comments)     Current Outpatient Medications   Medication Sig Dispense Refill    cyclobenzaprine (FLEXERIL) 10 mg tablet Take 1 Tab by mouth nightly. 30 Tab 0    XARELTO 20 mg tab tablet TAKE ONE TABLET BY MOUTH DAILY 30 Tab 1    oxybutynin chloride XL (DITROPAN XL) 10 mg CR tablet       pravastatin (PRAVACHOL) 40 mg tablet TAKE ONE TABLET BY MOUTH DAILY 90 Tab 2    cyanocobalamin (VITAMIN B-12) 500 mcg tablet Take 500 mcg by mouth daily.  ergocalciferol (ERGOCALCIFEROL) 50,000 unit capsule Take 50,000 Units by mouth. Take one capsule on the 1st and 15th of every month      sildenafil (REVATIO) 20 mg tablet Take 20 mg by mouth as needed.  calcium carbonate (TUMS) 200 mg calcium (500 mg) chew Take 2 tablets by mouth daily.  calcium-cholecalciferol, d3, (CALCIUM 600 + D) 600-125 mg-unit tab Take 1 tablet by mouth daily.       acetaminophen (TYLENOL EXTRA STRENGTH) 500 mg tablet Take 1,000 mg by mouth every six (6) hours as needed for Pain.  diphenhydrAMINE (BENADRYL) 25 mg capsule Take 25 mg by mouth every six (6) hours as needed for Itching or Sleep (allergies).        Social History     Socioeconomic History    Marital status:      Spouse name: Not on file    Number of children: Not on file    Years of education: Not on file    Highest education level: Not on file   Tobacco Use    Smoking status: Never Smoker    Smokeless tobacco: Never Used   Substance and Sexual Activity    Alcohol use: No    Drug use: No     Family History   Problem Relation Age of Onset    Hypertension Mother    Trinity Anaya Other Mother         abdominal aneurysm    Cancer Father         bladder        Health Maintenance   Topic Date Due    GLAUCOMA SCREENING Q2Y  09/05/2005    Influenza Age 5 to Adult  08/01/2019    MEDICARE YEARLY EXAM  08/03/2019    DTaP/Tdap/Td series (2 - Td) 08/02/2028    Shingrix Vaccine Age 50>  Completed    Pneumococcal 65+ years  Completed        Review of Systems  Constitutional: negative for fevers, chills, anorexia and weight loss  Eyes:   negative for visual disturbance and irritation  ENT:   negative for tinnitus,sore throat,nasal congestion,ear pain,hoarseness  Respiratory:  negative for cough, hemoptysis, dyspnea,wheezing  CV:   negative for chest pain, palpitations, lower extremity edema  GI:   negative for nausea, vomiting, diarrhea, abdominal pain,melena  Endo:               negative for polyuria,polydipsia,polyphagia,heat intolerance  Genitourinary: negative for frequency, dysuria and hematuria  Integumentary: negative for rash and pruritus  Hematologic:  negative for easy bruising and gum/nose bleeding  Musculoskel: Positive for mid back pain  Neurological:  negative for headaches, dizziness, vertigo, memory problems and gait   Behavl/Psych: negative for feelings of anxiety, depression, mood changes  ROS otherwise negative      Objective:  Visit Vitals  /82 (BP 1 Location: Right arm, BP Patient Position: Sitting)   Pulse 60   Temp 97.6 °F (36.4 °C) (Oral)   Resp 16   Ht 6' 1\" (1.854 m)   Wt 170 lb 9.6 oz (77.4 kg)   SpO2 97%   BMI 22.51 kg/m²     Body mass index is 22.51 kg/m². Physical Exam:   General appearance - alert, well appearing, and in no distress  Mental status - alert, oriented to person, place, and time  EYE-BLAS, EOMI,conjunctiva normal bilaterally, lids normal  ENT-ENT exam normal, no neck nodes or sinus tenderness  Nose - normal and patent, no erythema,  Or discharge   Mouth - mucous membranes moist, pharynx normal without lesions  Neck - supple, no significant adenopathy or bruit  Chest - clear to auscultation, no wheezes, rales or rhonchi. Heart - normal rate, regular rhythm, normal S1, S2, no murmurs, rubs, clicks or gallops   Abdomen - soft, nontender, nondistended, no masses or organomegaly  Lymph- no adenopathy palpable  Ext-peripheral pulses normal, no pedal edema, no clubbing or cyanosis  Skin-Warm and dry. no hyperpigmentation, vitiligo, or suspicious lesions  Neuro -alert, oriented, normal speech, no focal findings or movement disorder noted    In addition this patient is seen for AWV  as detailed below: This is a Subsequent Medicare Annual Wellness Exam (AWV) (Performed 12 months after IPPE or effective date of Medicare Part B enrollment)    I have reviewed the patient's medical history in detail and updated the computerized patient record. Problem list reviewed with patient and risk factors discussed. PSH, SH, FH, Medications and HM issues also reviewed and discussed. Depression screen, fall risk assessment, functional abilities and ACP also reviewed and discussed as above and below.     Depression Risk Factor Screening:     3 most recent PHQ Screens 3/5/2019   Little interest or pleasure in doing things Not at all   Feeling down, depressed, irritable, or hopeless Not at all   Total Score PHQ 2 0     Alcohol Risk Factor Screening: You do not drink alcohol or very rarely. Functional Ability and Level of Safety:   Hearing Loss  The patient wears hearing aids. Activities of Daily Living  The home contains: handrails and grab bars  Patient does total self care    Fall Risk  Fall Risk Assessment, last 12 mths 3/5/2019   Able to walk? Yes   Fall in past 12 months? No       Abuse Screen  Patient is not abused    Cognitive Screening   Evaluation of Cognitive Function:  Has your family/caregiver stated any concerns about your memory: no  Normal    Patient Care Team   Patient Care Team:  Candis Adorno MD as PCP - General (Internal Medicine)  Castillo Bennett MD (Endocrinology)  Colleen Howard MD (Urology)  Kerry Oneill DPM (Podiatry)    Assessment/Plan   Education and counseling provided:  Are appropriate based on today's review and evaluation    Assessment/Plan:   Impressions:      ICD-10-CM ICD-9-CM    1. Medicare annual wellness visit, subsequent Z00.00 V70.0    2. Hypercholesterolemia E78.00 272.0 CBC WITH AUTOMATED DIFF      COLLECTION VENOUS BLOOD,VENIPUNCTURE      LIPID PANEL      METABOLIC PANEL, COMPREHENSIVE      TSH 3RD GENERATION   3. Long-term use of high-risk medication Z79.899 V58.69 CK   4. Factor V Leiden mutation ([facility] D68.51 289.81    5. Long term current use of anticoagulant therapy Z79.01 V58.61    6. Vitamin D deficiency E55.9 268.9    7. Osteopenia, unspecified location M85.80 733.90    8. Prostatic cancer ([facility] C61 185    9. Renal cell carcinoma, unspecified laterality (HCC) C64.9 189.0    10. Recurrent deep vein thrombosis (DVT) ([facility]) I82.409 453.40    11. Mid back pain M54.9 724.5         Plan:  1. Continue present meds  2. Lifestyle modifications including Na restriction, low carb/fat diet, weight reduction and exercise (at least a walking program). Follow-up and Dispositions    · Return in about 6 months (around 3/12/2020).            Orders Placed This Encounter    CBC WITH AUTOMATED DIFF    CK ([facility]    LIPID PANEL ([facility])    METABOLIC PANEL, COMPREHENSIVE ([facility])    TSH 3RD GENERATION    COLLECTION VENOUS BLOOD,VENIPUNCTURE    cyclobenzaprine (FLEXERIL) 10 mg tablet     Sig: Take 1 Tab by mouth nightly. Dispense:  30 Tab     Refill:  0       Gino Skelton MD   Assessment/Plan:  Diagnoses and all orders for this visit:    Medicare annual wellness visit, subsequent    Hypercholesterolemia  -     CBC WITH AUTOMATED DIFF  -     COLLECTION VENOUS BLOOD,VENIPUNCTURE  -     LIPID PANEL  -     METABOLIC PANEL, COMPREHENSIVE  -     TSH 3RD GENERATION    Long-term use of high-risk medication  -     CK    Factor V Leiden mutation ([facility]    Long term current use of anticoagulant therapy    Vitamin D deficiency    Osteopenia, unspecified location    Prostatic cancer ([facility]    Renal cell carcinoma, unspecified laterality ([facility]    Recurrent deep vein thrombosis (DVT) (HCC)    Mid back pain    Other orders  -     cyclobenzaprine (FLEXERIL) 10 mg tablet; Take 1 Tab by mouth nightly., Normal, Disp-30 Tab, R-0        Health Maintenance Due   Topic Date Due    GLAUCOMA SCREENING Q2Y  09/05/2005    Influenza Age 5 to Adult  08/01/2019    MEDICARE YEARLY EXAM  08/03/2019       Other instructions: The patient's medications were reviewed and reconciled. No change in his current medical regimen is made. Flexeril will be refilled to be taken on a as needed basis for his mid back pain. A prudent diet and exercise are encouraged. Health maintenance issues were reviewed. He is up-to-date on glaucoma screening and will have his eye doctor send a copy of his last exam to us. Age-appropriate vaccinations were reviewed and he will have an influenza vaccination in October. Await results of multiple labs. Follow-up in 6 months    Follow-up and Dispositions    · Return in about 6 months (around 3/12/2020).          I have reviewed with the patient details of the assessment and plan and all questions were answered. Relevent patient education was performed. The most recent lab findings were reviewed with the patient. An After Visit Summary was printed and given to the patient.     Fleeta Blizzard, MD

## 2019-09-13 LAB
BASOPHILS # BLD AUTO: 0 X10E3/UL (ref 0–0.2)
BASOPHILS NFR BLD AUTO: 1 %
EOSINOPHIL # BLD AUTO: 0.1 X10E3/UL (ref 0–0.4)
EOSINOPHIL NFR BLD AUTO: 2 %
ERYTHROCYTE [DISTWIDTH] IN BLOOD BY AUTOMATED COUNT: 13.9 % (ref 12.3–15.4)
HCT VFR BLD AUTO: 45.9 % (ref 37.5–51)
HGB BLD-MCNC: 15.3 G/DL (ref 13–17.7)
IMM GRANULOCYTES # BLD AUTO: 0 X10E3/UL (ref 0–0.1)
IMM GRANULOCYTES NFR BLD AUTO: 1 %
LYMPHOCYTES # BLD AUTO: 1.7 X10E3/UL (ref 0.7–3.1)
LYMPHOCYTES NFR BLD AUTO: 25 %
MCH RBC QN AUTO: 29.8 PG (ref 26.6–33)
MCHC RBC AUTO-ENTMCNC: 33.3 G/DL (ref 31.5–35.7)
MCV RBC AUTO: 89 FL (ref 79–97)
MONOCYTES # BLD AUTO: 0.6 X10E3/UL (ref 0.1–0.9)
MONOCYTES NFR BLD AUTO: 8 %
NEUTROPHILS # BLD AUTO: 4.3 X10E3/UL (ref 1.4–7)
NEUTROPHILS NFR BLD AUTO: 63 %
PLATELET # BLD AUTO: 147 X10E3/UL (ref 150–450)
RBC # BLD AUTO: 5.14 X10E6/UL (ref 4.14–5.8)
TSH SERPL DL<=0.005 MIU/L-ACNC: 2.26 UIU/ML (ref 0.45–4.5)
WBC # BLD AUTO: 6.7 X10E3/UL (ref 3.4–10.8)

## 2019-09-27 ENCOUNTER — OFFICE VISIT (OUTPATIENT)
Dept: INTERNAL MEDICINE CLINIC | Age: 79
End: 2019-09-27

## 2019-09-27 VITALS
OXYGEN SATURATION: 97 % | WEIGHT: 169.4 LBS | HEART RATE: 62 BPM | BODY MASS INDEX: 22.45 KG/M2 | HEIGHT: 73 IN | RESPIRATION RATE: 16 BRPM | DIASTOLIC BLOOD PRESSURE: 64 MMHG | SYSTOLIC BLOOD PRESSURE: 120 MMHG | TEMPERATURE: 97.7 F

## 2019-09-27 DIAGNOSIS — J02.9 ACUTE PHARYNGITIS, UNSPECIFIED ETIOLOGY: Primary | ICD-10-CM

## 2019-09-27 RX ORDER — AZITHROMYCIN 250 MG/1
250 TABLET, FILM COATED ORAL SEE ADMIN INSTRUCTIONS
Qty: 6 TAB | Refills: 0 | Status: SHIPPED | OUTPATIENT
Start: 2019-09-27 | End: 2020-04-14 | Stop reason: ALTCHOICE

## 2019-09-27 NOTE — PATIENT INSTRUCTIONS
Sore Throat: Care Instructions Your Care Instructions Infection by bacteria or a virus causes most sore throats. Cigarette smoke, dry air, air pollution, allergies, and yelling can also cause a sore throat. Sore throats can be painful and annoying. Fortunately, most sore throats go away on their own. If you have a bacterial infection, your doctor may prescribe antibiotics. Follow-up care is a key part of your treatment and safety. Be sure to make and go to all appointments, and call your doctor if you are having problems. It's also a good idea to know your test results and keep a list of the medicines you take. How can you care for yourself at home? · If your doctor prescribed antibiotics, take them as directed. Do not stop taking them just because you feel better. You need to take the full course of antibiotics. · Gargle with warm salt water once an hour to help reduce swelling and relieve discomfort. Use 1 teaspoon of salt mixed in 1 cup of warm water. · Take an over-the-counter pain medicine, such as acetaminophen (Tylenol), ibuprofen (Advil, Motrin), or naproxen (Aleve). Read and follow all instructions on the label. · Be careful when taking over-the-counter cold or flu medicines and Tylenol at the same time. Many of these medicines have acetaminophen, which is Tylenol. Read the labels to make sure that you are not taking more than the recommended dose. Too much acetaminophen (Tylenol) can be harmful. · Drink plenty of fluids. Fluids may help soothe an irritated throat. Hot fluids, such as tea or soup, may help decrease throat pain. · Use over-the-counter throat lozenges to soothe pain. Regular cough drops or hard candy may also help. These should not be given to young children because of the risk of choking. · Do not smoke or allow others to smoke around you. If you need help quitting, talk to your doctor about stop-smoking programs and medicines. These can increase your chances of quitting for good. · Use a vaporizer or humidifier to add moisture to your bedroom. Follow the directions for cleaning the machine. When should you call for help? Call your doctor now or seek immediate medical care if: 
  · You have new or worse trouble swallowing.  
  · Your sore throat gets much worse on one side.  
 Watch closely for changes in your health, and be sure to contact your doctor if you do not get better as expected. Where can you learn more? Go to http://kenna-lisa.info/. Enter [phone] [id number] in the search box to learn more about \"Sore Throat: Care Instructions. \" Current as of: October 21, 2018 Content Version: 12.2 © 9172-7752 [employer]. Care instructions adapted under license by [employer] (which disclaims liability or warranty for this information). If you have questions about a medical condition or this instruction, always ask your healthcare professional. [place] [address] any warranty or liability for your use of this information.

## 2019-09-27 NOTE — PROGRESS NOTES
Beto Flores is a 78 y.o. male presenting for Sore Throat  . 1. Have you been to the ER, urgent care clinic since your last visit? Hospitalized since your last visit? No    2. Have you seen or consulted any other health care providers outside of the [address] [place] [address] since your last visit? Include any pap smears or colon screening. No    Fall Risk Assessment, last 12 mths 3/5/2019   Able to walk? Yes   Fall in past 12 months? No         Abuse Screening Questionnaire 3/5/2019   Do you ever feel afraid of your partner? N   Are you in a relationship with someone who physically or mentally threatens you? N   Is it safe for you to go home? Y       3 most recent PHQ Screens 3/5/2019   Little interest or pleasure in doing things Not at all   Feeling down, depressed, irritable, or hopeless Not at all   Total Score PHQ 2 0       There are no discontinued medications.

## 2019-09-27 NOTE — PROGRESS NOTES
This note will not be viewable in [address] Adalid Young is a 78 y.o. male and presents with Sore Throat  . Subjective:  Mr. Nury Garcia presents to the office today with complaints of a severe sore throat. Began over the last several days. He denies high fever or shaking chills. He has had no facial pain gum discomfort and purulent sinus drainage. There is been no significant cough.     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    Factor V Leiden mutation ([address] 10/3/2017    Gastroesophageal reflux disease without esophagitis 10/3/2017    Herpes zoster virus infection of face and ear nerves 10/3/2017    Hypercholesterolemia 10/3/2017    Long-term (current) use of anticoagulants 10/3/2017    Long-term use of high-risk medication 10/3/2017    Lumbar sprain 10/3/2017    Mid back pain 12/14/2018    Osteopenia 10/3/2017    Other ill-defined conditions(799.89)     High Cholesterol    Other ill-defined conditions(799.89)     doctor does not want him to have quinolones family -causes achilles tendonitis    Prostatic cancer ([address] 10/3/2017    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/2017    Recurrent deep vein thrombosis (DVT) ([address] 10/3/2017    Renal cell cancer ([address] 10/3/2017    Thromboembolus ([address]     DVT    Toe fracture, left 10/3/2017    Vitamin D deficiency 10/3/2017     Past Surgical History:   Procedure Laterality Date    COLORECTAL SCRN; HI RISK IND  2/3/2015         HX ORTHOPAEDIC      cortisone shot in knee and elbow    HX OTHER SURGICAL      colonoscopy    HX TONSILLECTOMY      HX UROLOGICAL Right     Kidney Removal     Allergies   Allergen Reactions  Quinolones Unknown (comments)     Current Outpatient Medications   Medication Sig Dispense Refill    azithromycin (ZITHROMAX) 250 mg tablet Take 1 Tab by mouth See Admin Instructions. 6 Tab 0    cyclobenzaprine (FLEXERIL) 10 mg tablet Take 1 Tab by mouth nightly. 30 Tab 0    XARELTO 20 mg tab tablet TAKE ONE TABLET BY MOUTH DAILY 30 Tab 1    oxybutynin chloride XL (DITROPAN XL) 10 mg CR tablet       pravastatin (PRAVACHOL) 40 mg tablet TAKE ONE TABLET BY MOUTH DAILY 90 Tab 2    cyanocobalamin (VITAMIN B-12) 500 mcg tablet Take 500 mcg by mouth daily.  ergocalciferol (ERGOCALCIFEROL) 50,000 unit capsule Take 50,000 Units by mouth. Take one capsule on the 1st and 15th of every month      sildenafil (REVATIO) 20 mg tablet Take 20 mg by mouth as needed.  calcium carbonate (TUMS) 200 mg calcium (500 mg) chew Take 2 tablets by mouth daily.  calcium-cholecalciferol, d3, (CALCIUM 600 + D) 600-125 mg-unit tab Take 1 tablet by mouth daily.  acetaminophen (TYLENOL EXTRA STRENGTH) 500 mg tablet Take 1,000 mg by mouth every six (6) hours as needed for Pain.  diphenhydrAMINE (BENADRYL) 25 mg capsule Take 25 mg by mouth every six (6) hours as needed for Itching or Sleep (allergies). Social History     Socioeconomic History    Marital status:      Spouse name: Not on file    Number of children: Not on file    Years of education: Not on file    Highest education level: Not on file   Tobacco Use    Smoking status: Never Smoker    Smokeless tobacco: Never Used   Substance and Sexual Activity    Alcohol use: No    Drug use: No     Family History   Problem Relation Age of Onset    Hypertension Mother    Delgado Other Mother         abdominal aneurysm    Cancer Father         bladder        Review of Systems  Constitutional: negative for fevers, chills, anorexia and weight loss  Eyes:   negative for visual disturbance and irritation  ENT:   Positive for sore throat, drainage.  Denies dysphageia. LN's tender. Respiratory:  negative for cough, hemoptysis, dyspnea,wheezing  CV:   negative for chest pain, palpitations, lower extremity edema  GI:   negative for nausea, vomiting, diarrhea, abdominal pain,melena  Integumentary: negative for rash and pruritus  Neurological:  negative for headaches, dizziness, vertigo, memory problems and gait       Objective:  Visit Vitals  /64 (BP 1 Location: Right arm, BP Patient Position: Sitting)   Pulse 62   Temp 97.7 °F (36.5 °C) (Oral)   Resp 16   Ht 6' 1\" (1.854 m)   Wt 169 lb 6.4 oz (76.8 kg)   SpO2 97%   BMI 22.35 kg/m²     Body mass index is 22.35 kg/m². Physical Exam:   General appearance - alert, well appearing, and in no distress  Mental status - alert, oriented to person, place, and time  EYE-BLAS, EOMI, sclera clear. No lid swelling or purulent drainage  ENT- TM's clear without A/F level. Pharynx erythematous with drainage noted  Nose - normal and patent, no erythema,  Neck - supple, with tender anterior nodes   Chest - clear to auscultation, no wheezes, rales or rhonchi, symmetric air entry   Heart - normal rate, regular rhythm, normal S1, S2, no murmurs, rubs, clicks or gallops   Skin-No rash appreciated  Neuro -alert, oriented, normal speech, no focal findings. Assessment/Plan:  Diagnoses and all orders for this visit:    Acute pharyngitis, unspecified etiology  -     azithromycin (ZITHROMAX) 250 mg tablet; Take 1 Tab by mouth See Admin Instructions. , Normal, Disp-6 Tab, R-0        Other Instructions:  He likely has strep throat    Warm salt water gargles to be started    Tylenol and chloraseptic spray to be used symptomatically    Increase po fluids    Follow-up and Dispositions    · Return if symptoms worsen or fail to improve. I have reviewed with the patient details of the assessment and plan and all questions were answered. Relevent patient education was performed.     An After Visit Summary was printed and given to the patient.     Rosa Reddy MD

## 2019-10-23 RX ORDER — PRAVASTATIN SODIUM 40 MG/1
TABLET ORAL
Qty: 90 TAB | Refills: 1 | Status: SHIPPED | OUTPATIENT
Start: 2019-10-23 | End: 2021-01-21

## 2019-11-07 ENCOUNTER — CLINICAL SUPPORT (OUTPATIENT)
Dept: INTERNAL MEDICINE CLINIC | Age: 79
End: 2019-11-07

## 2019-11-07 VITALS
SYSTOLIC BLOOD PRESSURE: 132 MMHG | TEMPERATURE: 98.3 F | WEIGHT: 172.4 LBS | HEART RATE: 68 BPM | HEIGHT: 73 IN | RESPIRATION RATE: 18 BRPM | OXYGEN SATURATION: 94 % | DIASTOLIC BLOOD PRESSURE: 89 MMHG | BODY MASS INDEX: 22.85 KG/M2

## 2019-11-07 DIAGNOSIS — Z23 ENCOUNTER FOR IMMUNIZATION: Primary | ICD-10-CM

## 2019-11-07 NOTE — PROGRESS NOTES
After obtaining consent and per verbal order from Dr. Polly Baez, patient received influenza vaccine given by Joana Dash . Fluad Influenza 0.5ml was given IM in left deltoid. Patient tolerated injection and was observed for 10 minutes post injection. VIS was given.

## 2020-03-09 ENCOUNTER — HOSPITAL ENCOUNTER (OUTPATIENT)
Dept: GENERAL RADIOLOGY | Age: 80
Discharge: HOME | End: 2020-03-09
Payer: MEDICARE

## 2020-03-09 DIAGNOSIS — Z85.528 HISTORY OF KIDNEY CANCER: ICD-10-CM

## 2020-03-09 PROCEDURE — 71046 X-RAY EXAM CHEST 2 VIEWS: CPT

## 2020-03-24 PROBLEM — J31.0 PURULENT RHINITIS: Status: RESOLVED | Noted: 2017-10-03 | Resolved: 2020-03-24

## 2020-03-24 PROBLEM — K63.5 COLON POLYPS: Status: RESOLVED | Noted: 2017-10-03 | Resolved: 2020-03-24

## 2020-03-24 PROBLEM — B02.29 HERPES ZOSTER VIRUS INFECTION OF FACE AND EAR NERVES: Status: RESOLVED | Noted: 2017-10-03 | Resolved: 2020-03-24

## 2020-03-24 PROBLEM — S92.912A TOE FRACTURE, LEFT: Status: RESOLVED | Noted: 2017-10-03 | Resolved: 2020-03-24

## 2020-03-24 PROBLEM — J30.9 ALLERGIC RHINITIS: Status: RESOLVED | Noted: 2017-10-03 | Resolved: 2020-03-24

## 2020-03-24 PROBLEM — K29.80 DUODENITIS: Status: RESOLVED | Noted: 2017-10-03 | Resolved: 2020-03-24

## 2020-03-24 PROBLEM — C61 PROSTATIC CANCER (HCC): Status: RESOLVED | Noted: 2017-10-03 | Resolved: 2020-03-24

## 2020-03-24 PROBLEM — S33.5XXA LUMBAR SPRAIN: Status: RESOLVED | Noted: 2017-10-03 | Resolved: 2020-03-24

## 2020-03-26 PROBLEM — Z85.46 HISTORY OF PROSTATE CANCER: Status: ACTIVE | Noted: 2020-03-26

## 2020-04-13 ENCOUNTER — TELEPHONE (OUTPATIENT)
Dept: INTERNAL MEDICINE CLINIC | Age: 80
End: 2020-04-13

## 2020-04-14 ENCOUNTER — OFFICE VISIT (OUTPATIENT)
Dept: INTERNAL MEDICINE CLINIC | Age: 80
End: 2020-04-14

## 2020-04-14 VITALS
WEIGHT: 169 LBS | OXYGEN SATURATION: 98 % | DIASTOLIC BLOOD PRESSURE: 80 MMHG | BODY MASS INDEX: 22.4 KG/M2 | HEIGHT: 73 IN | HEART RATE: 59 BPM | RESPIRATION RATE: 16 BRPM | TEMPERATURE: 98 F | SYSTOLIC BLOOD PRESSURE: 122 MMHG

## 2020-04-14 DIAGNOSIS — L03.113 CELLULITIS OF RIGHT UPPER EXTREMITY: Primary | ICD-10-CM

## 2020-04-14 RX ORDER — CEPHALEXIN 500 MG/1
500 CAPSULE ORAL 4 TIMES DAILY
Qty: 28 CAP | Refills: 0 | Status: SHIPPED | OUTPATIENT
Start: 2020-04-14 | End: 2020-05-01 | Stop reason: ALTCHOICE

## 2020-04-14 NOTE — PATIENT INSTRUCTIONS

## 2020-04-14 NOTE — PROGRESS NOTES
This note will not be viewable in [address] Subjective:     Mr. Ángela Aguila presents the office today complaints of an infection involving the lateral aspect of his right elbow. States that he tripped and fell on uneven cement when walking on the sidewalk 4 weeks ago. There was a small scrape over the lateral epicondylar region which she has been applying bacitracin to. It is progressively developed a reddened area. Periodically he has been applying triple antibiotic ointment to the area but not on a consistent basis. He denies any fevers or chills.     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    Factor V Leiden mutation ([address] 10/3/2017    Gastroesophageal reflux disease without esophagitis 10/3/2017    Herpes zoster virus infection of face and ear nerves 10/3/2017    Hypercholesterolemia 10/3/2017    Long-term (current) use of anticoagulants 10/3/2017    Long-term use of high-risk medication 10/3/2017    Lumbar sprain 10/3/2017    Mid back pain 12/14/2018    Osteopenia 10/3/2017    Other ill-defined conditions(799.89)     High Cholesterol    Other ill-defined conditions(799.89)     doctor does not want him to have quinolones family -causes achilles tendonitis    Prostatic cancer ([address] 10/3/2017    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/2017    Recurrent deep vein thrombosis (DVT) ([address] 10/3/2017    Renal cell cancer ([address] 10/3/2017    Thromboembolus ([address]     DVT    Toe fracture, left 10/3/2017    Vitamin D deficiency 10/3/2017     Past Surgical History:   Procedure Laterality Date    COLORECTAL SCRN; HI RISK IND  2/3/2015         HX ORTHOPAEDIC      cortisone shot in knee and elbow    HX OTHER SURGICAL      colonoscopy    HX TONSILLECTOMY      HX UROLOGICAL Right     Kidney Removal     Allergies   Allergen Reactions    Quinolones Unknown (comments)     Current Outpatient Medications   Medication Sig Dispense Refill    cephALEXin (KEFLEX) 500 mg capsule Take 1 Cap by mouth four (4) times daily. 28 Cap 0    pravastatin (PRAVACHOL) 40 mg tablet TAKE ONE TABLET BY MOUTH DAILY 90 Tab 1    XARELTO 20 mg tab tablet TAKE ONE TABLET BY MOUTH DAILY 30 Tab 6    cyclobenzaprine (FLEXERIL) 10 mg tablet Take 1 Tab by mouth nightly. 30 Tab 0    oxybutynin chloride XL (DITROPAN XL) 10 mg CR tablet       cyanocobalamin (VITAMIN B-12) 500 mcg tablet Take 500 mcg by mouth daily.  ergocalciferol (ERGOCALCIFEROL) 50,000 unit capsule Take 50,000 Units by mouth. Take one capsule on the 1st and 15th of every month      sildenafil (REVATIO) 20 mg tablet Take 20 mg by mouth as needed.  calcium carbonate (TUMS) 200 mg calcium (500 mg) chew Take 2 tablets by mouth daily.  calcium-cholecalciferol, d3, (CALCIUM 600 + D) 600-125 mg-unit tab Take 1 tablet by mouth daily.  acetaminophen (TYLENOL EXTRA STRENGTH) 500 mg tablet Take 1,000 mg by mouth every six (6) hours as needed for Pain.  diphenhydrAMINE (BENADRYL) 25 mg capsule Take 25 mg by mouth every six (6) hours as needed for Itching or Sleep (allergies).        Social History     Socioeconomic History    Marital status:      Spouse name: Not on file    Number of children: Not on file    Years of education: Not on file    Highest education level: Not on file   Tobacco Use    Smoking status: Never Smoker    Smokeless tobacco: Never Used   Substance and Sexual Activity    Alcohol use: No    Drug use: No     Family History   Problem Relation Age of Onset    Hypertension Mother    Nicole Younger Other Mother         abdominal aneurysm    Cancer Father         bladder        Review of Systems:  GEN: no weight loss, weight gain, fatigue or night sweats  CV: no PND, orthopnea, or palpitations  Resp: no dyspnea on exertion, no cough  Abd: no nausea, vomiting or diarrhea  EXT: denies edema, claudication  Derm: Erythematous right lateral elbow as noted  Neurological ROS: no TIA or stroke symptoms  ROS otherwise negative      Objective:     Visit Vitals  /80 (BP 1 Location: Left arm, BP Patient Position: Sitting)   Pulse (!) 59   Temp 98 °F (36.7 °C) (Oral)   Resp 16   Ht 6' 1\" (1.854 m)   Wt 169 lb (76.7 kg)   SpO2 98%   BMI 22.30 kg/m²     Body mass index is 22.3 kg/m². General:   alert, cooperative and no distress   Extremities:  A 3 x 3 cm area of erythema overlying the right lateral epicondylar region. There is no proximal cellulitic streaking nor is there any drainage. Neuro: ..alert, oriented x3,speech normal in context and clarity, cranial nerves II-XII intact,motor strength: full proximally and distally,gait: normal  reflexes: full and symmetric     Physical exam otherwise negative         Assessment/Plan:     Diagnoses and all orders for this visit:    Cellulitis of right upper extremity  -     cephALEXin (KEFLEX) 500 mg capsule; Take 1 Cap by mouth four (4) times daily. , Normal, Disp-28 Cap, R-0        Other instructions: The patient has a localized cellulitis of the right epicondylar region. Cannot rule out a secondary allergic reaction to the neomycin and the triple antibiotic that he periodically was using. Start Keflex 500 mg 4 times a day for 7 days and avoid use of triple antibiotic ointment. He is to contact me in 3 to 4 days if this area is not improving    Follow-up and Dispositions    · Return if symptoms worsen or fail to improve. Sheela Correa MD    Please note that this dictation was completed with [email or web address], the [employer] voice recognition software.   Quite often unanticipated grammatical, syntax, homophones, and other interpretive errors are inadvertently transcribed by the computer software. Please disregard these errors. Please excuse any errors that have escaped final proofreading. no

## 2020-04-14 NOTE — PROGRESS NOTES
Kourtney Car is a 78 y.o. male presenting for Arm Injury (R)  . 1. Have you been to the ER, urgent care clinic since your last visit? Hospitalized since your last visit? No    2. Have you seen or consulted any other health care providers outside of the [address] [place] [address] since your last visit? Include any pap smears or colon screening. No    Fall Risk Assessment, last 12 mths 3/5/2019   Able to walk? Yes   Fall in past 12 months? No         Abuse Screening Questionnaire 3/5/2019   Do you ever feel afraid of your partner? N   Are you in a relationship with someone who physically or mentally threatens you? N   Is it safe for you to go home?  Y       3 most recent PHQ Screens 3/5/2019   Little interest or pleasure in doing things Not at all   Feeling down, depressed, irritable, or hopeless Not at all   Total Score PHQ 2 0       Medications Discontinued During This Encounter   Medication Reason    pravastatin (PRAVACHOL) 40 mg tablet Duplicate Order

## 2020-04-17 ENCOUNTER — TELEPHONE (OUTPATIENT)
Dept: INTERNAL MEDICINE CLINIC | Age: 80
End: 2020-04-17

## 2020-04-17 RX ORDER — TRIAMCINOLONE ACETONIDE 1 MG/G
CREAM TOPICAL 2 TIMES DAILY
Qty: 15 G | Refills: 0 | Status: SHIPPED | OUTPATIENT
Start: 2020-04-17 | End: 2022-05-11

## 2020-04-17 NOTE — TELEPHONE ENCOUNTER
Patient agreeable to cream. Please send pended Rx in to pharmacy. Requested Prescriptions     Pending Prescriptions Disp Refills    triamcinolone acetonide (KENALOG) 0.1 % topical cream 15 g 0     Sig: Apply  to affected area two (2) times a day.  use thin layer

## 2020-04-17 NOTE — TELEPHONE ENCOUNTER
Pt's wife called to inform Dr Rozanna Sacks that 's rash has improved just a little and that he would like to try the cream. Could it be called into the pharmacy?

## 2020-05-01 ENCOUNTER — OFFICE VISIT (OUTPATIENT)
Dept: INTERNAL MEDICINE CLINIC | Age: 80
End: 2020-05-01

## 2020-05-01 VITALS
OXYGEN SATURATION: 97 % | BODY MASS INDEX: 22.29 KG/M2 | DIASTOLIC BLOOD PRESSURE: 80 MMHG | RESPIRATION RATE: 16 BRPM | SYSTOLIC BLOOD PRESSURE: 122 MMHG | WEIGHT: 168.2 LBS | HEIGHT: 73 IN | TEMPERATURE: 98 F | HEART RATE: 66 BPM

## 2020-05-01 DIAGNOSIS — C61 PROSTATIC CANCER (HCC): ICD-10-CM

## 2020-05-01 DIAGNOSIS — E55.9 VITAMIN D DEFICIENCY: Chronic | ICD-10-CM

## 2020-05-01 DIAGNOSIS — I82.409 RECURRENT DEEP VEIN THROMBOSIS (DVT) (HCC): ICD-10-CM

## 2020-05-01 DIAGNOSIS — Z79.899 LONG-TERM USE OF HIGH-RISK MEDICATION: Chronic | ICD-10-CM

## 2020-05-01 DIAGNOSIS — E78.00 HYPERCHOLESTEROLEMIA: Primary | Chronic | ICD-10-CM

## 2020-05-01 DIAGNOSIS — D68.51 FACTOR V LEIDEN MUTATION (HCC): Chronic | ICD-10-CM

## 2020-05-01 DIAGNOSIS — Z79.01 LONG TERM CURRENT USE OF ANTICOAGULANT THERAPY: Chronic | ICD-10-CM

## 2020-05-01 DIAGNOSIS — M19.112 POST-TRAUMATIC OSTEOARTHRITIS OF LEFT SHOULDER: ICD-10-CM

## 2020-05-01 DIAGNOSIS — C64.9 RENAL CELL CARCINOMA, UNSPECIFIED LATERALITY (HCC): ICD-10-CM

## 2020-05-01 LAB
25(OH)D3 SERPL-MCNC: 28 NG/ML (ref 30–96)
A-G RATIO,AGRAT: 1.8 RATIO
ALBUMIN SERPL-MCNC: 4.4 G/DL (ref 3.9–5.4)
ALP SERPL-CCNC: 65 U/L (ref 38–126)
ALT SERPL-CCNC: 17 U/L (ref 0–50)
ANION GAP SERPL CALC-SCNC: 9 MMOL/L
AST SERPL W P-5'-P-CCNC: 23 U/L (ref 14–36)
BILIRUB SERPL-MCNC: 0.9 MG/DL (ref 0.2–1.3)
BILIRUB UR QL: NEGATIVE
BUN SERPL-MCNC: 17 MG/DL (ref 9–20)
BUN/CREATININE RATIO,BUCR: 17 RATIO
CALCIUM SERPL-MCNC: 9.6 MG/DL (ref 8.4–10.2)
CHLORIDE SERPL-SCNC: 102 MMOL/L (ref 98–107)
CHOL/HDL RATIO,CHHD: 2 RATIO (ref 0–4)
CHOLEST SERPL-MCNC: 170 MG/DL (ref 0–200)
CK SERPL-CCNC: 73 U/L (ref 30–135)
CLARITY: CLEAR
CO2 SERPL-SCNC: 29 MMOL/L (ref 22–32)
COLOR UR: ABNORMAL
CREAT SERPL-MCNC: 1 MG/DL (ref 0.8–1.5)
GLOBULIN,GLOB: 2.5
GLUCOSE 24H UR-MRATE: NEGATIVE G/(24.H)
GLUCOSE SERPL-MCNC: 105 MG/DL (ref 75–110)
HDLC SERPL-MCNC: 72 MG/DL (ref 35–130)
HGB UR QL STRIP: NEGATIVE
KETONES UR QL STRIP.AUTO: NEGATIVE
LDL/HDL RATIO,LDHD: 1 RATIO
LDLC SERPL CALC-MCNC: 72 MG/DL (ref 0–130)
LEUKOCYTE ESTERASE: NEGATIVE
NITRITE UR QL STRIP.AUTO: NEGATIVE
PH UR STRIP: 8 [PH] (ref 5–7)
POTASSIUM SERPL-SCNC: 4.3 MMOL/L (ref 3.6–5)
PROT SERPL-MCNC: 6.9 G/DL (ref 6.3–8.2)
PROT UR STRIP-MCNC: NEGATIVE MG/DL
RBC #/AREA URNS HPF: 0 #/HPF
SODIUM SERPL-SCNC: 140 MMOL/L (ref 137–145)
SP GR UR REFRACTOMETRY: 1.01 (ref 1–1.03)
TRIGL SERPL-MCNC: 130 MG/DL (ref 0–200)
TSH SERPL DL<=0.05 MIU/L-ACNC: 2.65 UIU/ML (ref 0.34–5.6)
UROBILINOGEN UR QL STRIP.AUTO: NEGATIVE
VLDLC SERPL CALC-MCNC: 26 MG/DL
WBC URNS QL MICRO: 0 #/HPF

## 2020-05-01 RX ORDER — DICLOFENAC SODIUM 10 MG/G
2 GEL TOPICAL 4 TIMES DAILY
Qty: 100 G | Refills: 1 | Status: SHIPPED | OUTPATIENT
Start: 2020-05-01 | End: 2021-05-04 | Stop reason: ALTCHOICE

## 2020-05-01 NOTE — PROGRESS NOTES
Laz Bejarano is a 78 y.o. male presenting for Follow Up Chronic Condition (6 mo fu)  . 1. Have you been to the ER, urgent care clinic since your last visit? Hospitalized since your last visit? No    2. Have you seen or consulted any other health care providers outside of the [address] [place] [address] since your last visit? Include any pap smears or colon screening. No    Fall Risk Assessment, last 12 mths 5/1/2020   Able to walk? Yes   Fall in past 12 months? No         Abuse Screening Questionnaire 5/1/2020   Do you ever feel afraid of your partner? N   Are you in a relationship with someone who physically or mentally threatens you? N   Is it safe for you to go home? Y       3 most recent PHQ Screens 5/1/2020   Little interest or pleasure in doing things Not at all   Feeling down, depressed, irritable, or hopeless Not at all   Total Score PHQ 2 0       There are no discontinued medications.

## 2020-05-01 NOTE — PATIENT INSTRUCTIONS
Clotting Factor Deficiencies: Care Instructions Your Care Instructions Clotting factors are substances in the blood that help stop bleeding after a cut or injury. They also prevent sudden bleeding. In people who have clotting factor problems, the clotting factors don't work right or, in some cases, are missing. When blood does not clot well, even minor injuries can cause serious bleeding. This can lead to blood loss, injury to internal organs, or damage to muscles or joints. Several conditions, including hemophilia, can make it hard for the blood to clot. Your doctor can treat you by giving you replacement clotting factors. You also may take medicine to prevent bleeding. You may often have clotting factors transfused into a vein to prevent bleeding, or you may get them as needed. You may eventually learn to do this at home. You can also try to prevent injuries that can cause you to bleed. Follow-up care is a key part of your treatment and safety. Be sure to make and go to all appointments, and call your doctor if you are having problems. It's also a good idea to know your test results and keep a list of the medicines you take. How can you care for yourself at home? · Take your medicines exactly as prescribed. Call your doctor if you think you are having a problem with your medicine. You will get more details on the specific medicines your doctor prescribes. · Stay at a healthy body weight. If you are overweight, the additional stress on joints can trigger bleeding. · Exercise safely. Avoid contact sports. Swim or walk to avoid excess pressure on your joints. Check with your doctor before doing activities that put you at high risk for falls, such as riding a bike. · Brush and floss your teeth daily. This may help you avoid problems that could lead to having a tooth pulled.  
· Avoid aspirin and nonsteroidal anti-inflammatory drugs (NSAIDs), such as ibuprofen (Advil, Motrin) or naproxen (Aleve). They can increase the chance of bleeding. · Take pain medicines exactly as directed. ? If the doctor gave you a prescription medicine for pain, take it as prescribed. ? If you are not taking a prescription pain medicine, ask your doctor if you can take an over-the-counter medicine. · Take care to prevent accidents at home: ? Make sure rugs are tacked down so you do not slip. ? Keep furniture with sharp edges out of pathways. ? Use nonskid floor wax. ? Wipe up spills quickly. ? If you live in an area that gets snow and ice in the winter, sprinkle salt on steps and sidewalks. ? Avoid loose-fitting shoes. You might lose your balance and fall. · Wear medical alert jewelry that lists your clotting problem. You can buy this at most drugstores. When should you call for help? Call 911 anytime you think you may need emergency care. For example, call if: 
  · You passed out (lost consciousness).  
  · You have signs of severe bleeding, which includes: ? You have a severe headache that is different from past headaches. ? You vomit blood or what looks like coffee grounds. ? Your stools are maroon or very bloody.  
 Call your doctor now or seek immediate medical care if: 
  · You are dizzy or lightheaded, or you feel like you may faint.  
  · You have abnormal bleeding, such as: ? A nosebleed that you can't easily stop. ? Your stools are black and look like tar, or they have streaks of blood. ? You have blood in your urine. ? You have joint pain. ? You have bruises or blood spots under your skin.  
 Watch closely for changes in your health, and be sure to contact your doctor if: 
  · You do not get better as expected. Where can you learn more? Go to http://kenna-lisa.info/ Enter A381 in the search box to learn more about \"Clotting Factor Deficiencies: Care Instructions. \" Current as of: November 7, 2019Content Version: 12.4 © 6918-4853 [employer]. Care instructions adapted under license by [employer] (which disclaims liability or warranty for this information). If you have questions about a medical condition or this instruction, always ask your healthcare professional. [address] any warranty or liability for your use of this information.

## 2020-05-01 NOTE — PROGRESS NOTES
This note will not be viewable in [address] Cyndi Morales is a 78 y.o. male and presents with Follow Up Chronic Condition (6 mo fu)  . Subjective:  Alexander Lugo presents to the office today in follow-up of multiple medical problems. He has hypercholesterolemia currently on statin therapy. He tolerates this without muscle soreness or GI upset. He has no history of coronary artery disease and denies exertional chest pains or claudication. He has vitamin D deficiency and remains on fairly good dose supplement. He does have a history of osteopenia and does take calcium along with this. He is due to have this level rechecked today. 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 He has been doing well with this medication without any bleeding problems and has had no recurrent clots. He has a history of renal cell carcinoma and prostate cancer and is followed by Dr. Neeta Wade on an every 6-month basis. He has had no recurrence of any of the cancers to date. Patient complains of arthritis of his left shoulder. It is present as a result of a dislocation that he had approximately 50 years ago. He has been prescribed Voltaren gel in the past to be applied on a as needed basis and would like to have this refilled today.     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    Factor V Leiden mutation ([facility] [address] 10/3/2017    Gastroesophageal reflux disease without esophagitis 10/3/2017    Herpes zoster virus infection of face and ear nerves 10/3/2017    Hypercholesterolemia 10/3/2017    Long-term (current) use of anticoagulants 10/3/2017    Long-term use of high-risk medication 10/3/2017    Lumbar sprain 10/3/2017    Mid back pain 12/14/2018    Osteopenia 10/3/2017    Other ill-defined conditions(799.89)     High Cholesterol    Other ill-defined conditions(799.89)     doctor does not want him to have quinolones family -causes achilles tendonitis    Prostatic cancer ([facility] [address] 10/3/2017    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/2017    Recurrent deep vein thrombosis (DVT) ([facility] [address] 10/3/2017    Renal cell cancer ([facility] [address] 10/3/2017    Thromboembolus ([facility] [address]     DVT    Toe fracture, left 10/3/2017    Vitamin D deficiency 10/3/2017     Past Surgical History:   Procedure Laterality Date    COLORECTAL SCRN; HI RISK IND  2/3/2015         HX ORTHOPAEDIC      cortisone shot in knee and elbow    HX OTHER SURGICAL      colonoscopy    HX TONSILLECTOMY      HX UROLOGICAL Right     Kidney Removal     Allergies   Allergen Reactions    Quinolones Unknown (comments)     Current Outpatient Medications   Medication Sig Dispense Refill    diclofenac (VOLTAREN) 1 % gel Apply 2 g to affected area four (4) times daily. 100 g 1    triamcinolone acetonide (KENALOG) 0.1 % topical cream Apply  to affected area two (2) times a day. use thin layer 15 g 0    pravastatin (PRAVACHOL) 40 mg tablet TAKE ONE TABLET BY MOUTH DAILY 90 Tab 1    XARELTO 20 mg tab tablet TAKE ONE TABLET BY MOUTH DAILY 30 Tab 6    oxybutynin chloride XL (DITROPAN XL) 10 mg CR tablet       cyanocobalamin (VITAMIN B-12) 500 mcg tablet Take 500 mcg by mouth daily.  ergocalciferol (ERGOCALCIFEROL) 50,000 unit capsule Take 50,000 Units by mouth. Take one capsule on the 1st and 15th of every month      calcium carbonate (TUMS) 200 mg calcium (500 mg) chew Take 2 tablets by mouth daily.  calcium-cholecalciferol, d3, (CALCIUM 600 + D) 600-125 mg-unit tab Take 1 tablet by mouth daily.       acetaminophen (TYLENOL EXTRA STRENGTH) 500 mg tablet Take 1,000 mg by mouth every six (6) hours as needed for Pain.  diphenhydrAMINE (BENADRYL) 25 mg capsule Take 25 mg by mouth every six (6) hours as needed for Itching or Sleep (allergies).        Social History     Socioeconomic History    Marital status:      Spouse name: Not on file    Number of children: Not on file    Years of education: Not on file    Highest education level: Not on file   Tobacco Use    Smoking status: Never Smoker    Smokeless tobacco: Never Used   Substance and Sexual Activity    Alcohol use: No    Drug use: No     Family History   Problem Relation Age of Onset    Hypertension Mother    Shama Other Mother         abdominal aneurysm    Cancer Father         bladder        Health Maintenance   Topic Date Due    Influenza Age 5 to Adult  08/01/2020    Medicare Yearly Exam  09/12/2020    Lipid Screen  09/12/2020    GLAUCOMA SCREENING Q2Y  08/08/2021    DTaP/Tdap/Td series (2 - Td) 08/02/2028    Shingrix Vaccine Age 50>  Completed    Pneumococcal 65+ years  Completed        Review of Systems  Constitutional: negative for fevers, chills, anorexia and weight loss  Eyes:   negative for visual disturbance and irritation  ENT:   negative for tinnitus,sore throat,nasal congestion,ear pain,hoarseness  Respiratory:  negative for cough, hemoptysis, dyspnea,wheezing  CV:   negative for chest pain, palpitations, lower extremity edema  GI:   negative for nausea, vomiting, diarrhea, abdominal pain,melena  Endo:               negative for polyuria,polydipsia,polyphagia,heat intolerance  Genitourinary: negative for frequency, dysuria and hematuria  Integumentary: negative for rash and pruritus  Hematologic:  negative for easy bruising and gum/nose bleeding  Musculoskel: Positive for left shoulder pain due to arthritis   neurological:  negative for headaches, dizziness, vertigo, memory problems and gait   Behavl/Psych: negative for feelings of anxiety, depression, mood changes  ROS otherwise negative      Objective:  Visit Vitals  /80 (BP 1 Location: Left arm, BP Patient Position: Sitting)   Pulse 66   Temp 98 °F (36.7 °C) (Oral)   Resp 16   Ht 6' 1\" (1.854 m)   Wt 168 lb 3.2 oz (76.3 kg)   SpO2 97%   BMI 22.19 kg/m²     Body mass index is 22.19 kg/m². Physical Exam:   General appearance - alert, well appearing, and in no distress  Mental status - alert, oriented to person, place, and time  EYE-BLAS, EOMI,conjunctiva normal bilaterally, lids normal  ENT-ENT exam normal, no neck nodes or sinus tenderness  Nose - normal and patent, no erythema,  Or discharge   Mouth - mucous membranes moist, pharynx normal without lesions  Neck - supple, no significant adenopathy or bruit  Chest - clear to auscultation, no wheezes, rales or rhonchi. Heart - normal rate, regular rhythm, normal S1, S2, no murmurs, rubs, clicks or gallops   Abdomen - soft, nontender, nondistended, no masses or organomegaly  Lymph- no adenopathy palpable  Ext-peripheral pulses normal, no pedal edema, no clubbing or cyanosis  Skin-Warm and dry. no hyperpigmentation, vitiligo, or suspicious lesions  Neuro -alert, oriented, normal speech, no focal findings or movement disorder noted      Assessment/Plan:  Diagnoses and all orders for this visit:    Hypercholesterolemia  -     LIPID PANEL  -     TSH 3RD GENERATION    Long-term use of high-risk medication  -     CBC WITH AUTOMATED DIFF  -     COLLECTION VENOUS BLOOD,VENIPUNCTURE  -     METABOLIC PANEL, COMPREHENSIVE  -     CK    Factor V Leiden mutation (HCC)    Recurrent deep vein thrombosis (DVT) (HCC)    Long term current use of anticoagulant therapy    Vitamin D deficiency  -     VITAMIN D, 25 HYDROXY    Renal cell carcinoma, unspecified laterality (HCC)  -     URINALYSIS W/MICROSCOPIC    Prostatic cancer (HCC)    Post-traumatic osteoarthritis of left shoulder  -     diclofenac (VOLTAREN) 1 % gel; Apply 2 g to affected area four (4) times daily. , Normal, Disp-100 g, R-1        Other instructions: The patient's medications were reviewed and reconciled. Voltaren gel will be added to his regimen to be used only on a as needed basis for arthritic pain of his left shoulder. A prudent diet and exercise are encouraged    Await results of multiple labs    Continued follow-up with urology regarding his prostate cancer and renal cell carcinoma. Follow-up in 6 months    Follow-up and Dispositions    · Return in about 6 months (around 11/1/2020). I have reviewed with the patient details of the assessment and plan and all questions were answered. Relevent patient education was performed. The most recent lab findings were reviewed with the patient. An After Visit Summary was printed and given to the patient. Gilma Alfaro MD    Please note that this dictation was completed with [employer], the computer voice recognition software. Quite often unanticipated grammatical, syntax, homophones, and other interpretive errors are inadvertently transcribed by the computer software. Please disregard these errors. Please excuse any errors that have escaped final proofreading.

## 2020-05-02 LAB
BASOPHILS # BLD AUTO: 0.1 X10E3/UL (ref 0–0.2)
BASOPHILS NFR BLD AUTO: 1 %
EOSINOPHIL # BLD AUTO: 0.2 X10E3/UL (ref 0–0.4)
EOSINOPHIL NFR BLD AUTO: 3 %
ERYTHROCYTE [DISTWIDTH] IN BLOOD BY AUTOMATED COUNT: 13.7 % (ref 11.6–15.4)
HCT VFR BLD AUTO: 43.4 % (ref 37.5–51)
HGB BLD-MCNC: 15 G/DL (ref 13–17.7)
IMM GRANULOCYTES # BLD AUTO: 0 X10E3/UL (ref 0–0.1)
IMM GRANULOCYTES NFR BLD AUTO: 0 %
LYMPHOCYTES # BLD AUTO: 1.6 X10E3/UL (ref 0.7–3.1)
LYMPHOCYTES NFR BLD AUTO: 26 %
MCH RBC QN AUTO: 29.8 PG (ref 26.6–33)
MCHC RBC AUTO-ENTMCNC: 34.6 G/DL (ref 31.5–35.7)
MCV RBC AUTO: 86 FL (ref 79–97)
MONOCYTES # BLD AUTO: 0.6 X10E3/UL (ref 0.1–0.9)
MONOCYTES NFR BLD AUTO: 10 %
NEUTROPHILS # BLD AUTO: 3.7 X10E3/UL (ref 1.4–7)
NEUTROPHILS NFR BLD AUTO: 60 %
PLATELET # BLD AUTO: 144 X10E3/UL (ref 150–450)
RBC # BLD AUTO: 5.03 X10E6/UL (ref 4.14–5.8)
WBC # BLD AUTO: 6.1 X10E3/UL (ref 3.4–10.8)

## 2020-05-04 RX ORDER — RIVAROXABAN 20 MG/1
TABLET, FILM COATED ORAL
Qty: 30 TAB | Refills: 5 | Status: SHIPPED | OUTPATIENT
Start: 2020-05-04 | End: 2020-11-04

## 2020-05-04 NOTE — PROGRESS NOTES
Patient's wife Mohamud Hyde informed of lab results and medication instructions. She states understanding and has no questions at this time.

## 2020-05-05 RX ORDER — ERGOCALCIFEROL 1.25 MG/1
50000 CAPSULE ORAL
Qty: 12 CAP | Refills: 3 | Status: SHIPPED | OUTPATIENT
Start: 2020-05-05 | End: 2021-04-09

## 2020-09-14 ENCOUNTER — HOSPITAL ENCOUNTER (OUTPATIENT)
Dept: GENERAL RADIOLOGY | Age: 80
Discharge: HOME | End: 2020-09-14
Payer: MEDICARE

## 2020-09-14 DIAGNOSIS — Z85.528 PERSONAL HISTORY OF KIDNEY CANCER: ICD-10-CM

## 2020-09-14 PROCEDURE — 71046 X-RAY EXAM CHEST 2 VIEWS: CPT

## 2020-10-12 ENCOUNTER — CLINICAL SUPPORT (OUTPATIENT)
Dept: INTERNAL MEDICINE CLINIC | Age: 80
End: 2020-10-12
Payer: MEDICARE

## 2020-10-12 VITALS
TEMPERATURE: 97.6 F | BODY MASS INDEX: 22.66 KG/M2 | DIASTOLIC BLOOD PRESSURE: 68 MMHG | HEART RATE: 69 BPM | WEIGHT: 171 LBS | OXYGEN SATURATION: 99 % | RESPIRATION RATE: 16 BRPM | SYSTOLIC BLOOD PRESSURE: 120 MMHG | HEIGHT: 73 IN

## 2020-10-12 DIAGNOSIS — Z23 NEEDS FLU SHOT: Primary | ICD-10-CM

## 2020-10-12 PROCEDURE — 90694 VACC AIIV4 NO PRSRV 0.5ML IM: CPT | Performed by: INTERNAL MEDICINE

## 2020-10-12 PROCEDURE — G0008 ADMIN INFLUENZA VIRUS VAC: HCPCS | Performed by: INTERNAL MEDICINE

## 2020-10-12 NOTE — PATIENT INSTRUCTIONS
Vaccine Information Statement    Influenza (Flu) Vaccine (Inactivated or Recombinant): What You Need to Know    Many Vaccine Information Statements are available in Faroese and other languages. See www.immunize.org/vis  Hojas de información sobre vacunas están disponibles en español y en muchos otros idiomas. Visite www.immunize.org/vis    1. Why get vaccinated? Influenza vaccine can prevent influenza (flu). Flu is a contagious disease that spreads around the [facility] every year, usually between October and May. Anyone can get the flu, but it is more dangerous for some people. Infants and young children, people 72years of age and older, pregnant women, and people with certain health conditions or a weakened immune system are at greatest risk of flu complications. Pneumonia, bronchitis, sinus infections and ear infections are examples of flu-related complications. If you have a medical condition, such as heart disease, cancer or diabetes, flu can make it worse. Flu can cause fever and chills, sore throat, muscle aches, fatigue, cough, headache, and runny or stuffy nose. Some people may have vomiting and diarrhea, though this is more common in children than adults. Each year thousands of people in the [facility] die from flu, and many more are hospitalized. Flu vaccine prevents millions of illnesses and flu-related visits to the doctor each year. 2. Influenza vaccines     CDC recommends everyone 10months of age and older get vaccinated every flu season. Children 6 months through 6years of age may need 2 doses during a single flu season. Everyone else needs only 1 dose each flu season. It takes about 2 weeks for protection to develop after vaccination. There are many flu viruses, and they are always changing. Each year a new flu vaccine is made to protect against three or four viruses that are likely to cause disease in the upcoming flu season.  Even when the vaccine doesnt exactly match these viruses, it may still provide some protection. Influenza vaccine does not cause flu. Influenza vaccine may be given at the same time as other vaccines. 3. Talk with your health care provider    Tell your vaccine provider if the person getting the vaccine:   Has had an allergic reaction after a previous dose of influenza vaccine, or has any severe, life-threatening allergies.  Has ever had Guillain-Barré Syndrome (also called GBS). In some cases, your health care provider may decide to postpone influenza vaccination to a future visit. People with minor illnesses, such as a cold, may be vaccinated. People who are moderately or severely ill should usually wait until they recover before getting influenza vaccine. Your health care provider can give you more information. 4. Risks of a reaction     Soreness, redness, and swelling where shot is given, fever, muscle aches, and headache can happen after influenza vaccine.  There may be a very small increased risk of Guillain-Barré Syndrome (GBS) after inactivated influenza vaccine (the flu shot). [facility] children who get the flu shot along with pneumococcal vaccine (PCV13), and/or DTaP vaccine at the same time might be slightly more likely to have a seizure caused by fever. Tell your health care provider if a child who is getting flu vaccine has ever had a seizure. People sometimes faint after medical procedures, including vaccination. Tell your provider if you feel dizzy or have vision changes or ringing in the ears. As with any medicine, there is a very remote chance of a vaccine causing a severe allergic reaction, other serious injury, or death. 5. What if there is a serious problem? An allergic reaction could occur after the vaccinated person leaves the clinic.  If you see signs of a severe allergic reaction (hives, swelling of the face and throat, difficulty breathing, a fast heartbeat, dizziness, or weakness), call 9-1-1 and get the person to the nearest hospital.    For other signs that concern you, call your health care provider. Adverse reactions should be reported to the Vaccine Adverse Event Reporting System (VAERS). Your health care provider will usually file this report, or you can do it yourself. Visit the VAERS website at www.vaers. [facility].gov or call 6-808.280.5320. VAERS is only for reporting reactions, and VAERS staff do not give medical advice. 6. The National Vaccine Injury Compensation Program    The [facility] Vaccine Injury Compensation Program (VICP) is a federal program that was created to compensate people who may have been injured by certain vaccines. Visit the VICP website at www.[facility].gov/vaccinecompensation or call 5-129.602.3105 to learn about the program and about filing a claim. There is a time limit to file a claim for compensation. 7. How can I learn more?  Ask your health care provider.  Call your local or state health department.  Contact the Centers for Disease Control and Prevention (CDC):  - Call 0-151.236.5571 (9-035-QBY-INFO) or  - Visit CDCs influenza website at www.cdc.gov/flu    Vaccine Information Statement (Interim)  Inactivated Influenza Vaccine   8/15/2019  42 JANN Lehman [id number]   Department of Health and Human Services  Centers for Disease Control and Prevention    Office Use Only

## 2020-10-12 NOTE — PROGRESS NOTES
After obtaining written consent and per orders of Dr. Shayne Kapoor, injection of Fluad given by [facility], [address] [place] [address]. Order and injection/medication verified by Dr Vinita Hi. Patient tolerated procedure well. VIS was given to them. No reactions noted.

## 2020-11-02 ENCOUNTER — OFFICE VISIT (OUTPATIENT)
Dept: INTERNAL MEDICINE CLINIC | Age: 80
End: 2020-11-02
Payer: MEDICARE

## 2020-11-02 VITALS
OXYGEN SATURATION: 97 % | RESPIRATION RATE: 16 BRPM | WEIGHT: 171.4 LBS | TEMPERATURE: 98.1 F | BODY MASS INDEX: 22.72 KG/M2 | DIASTOLIC BLOOD PRESSURE: 72 MMHG | SYSTOLIC BLOOD PRESSURE: 126 MMHG | HEIGHT: 73 IN | HEART RATE: 65 BPM

## 2020-11-02 DIAGNOSIS — Z79.899 LONG-TERM USE OF HIGH-RISK MEDICATION: Chronic | ICD-10-CM

## 2020-11-02 DIAGNOSIS — Z79.01 LONG TERM CURRENT USE OF ANTICOAGULANT THERAPY: Chronic | ICD-10-CM

## 2020-11-02 DIAGNOSIS — E55.9 VITAMIN D DEFICIENCY: Chronic | ICD-10-CM

## 2020-11-02 DIAGNOSIS — D68.51 FACTOR V LEIDEN MUTATION (HCC): Chronic | ICD-10-CM

## 2020-11-02 DIAGNOSIS — E78.00 HYPERCHOLESTEROLEMIA: Chronic | ICD-10-CM

## 2020-11-02 DIAGNOSIS — C64.9 RENAL CELL CARCINOMA, UNSPECIFIED LATERALITY (HCC): ICD-10-CM

## 2020-11-02 DIAGNOSIS — Z00.00 MEDICARE ANNUAL WELLNESS VISIT, SUBSEQUENT: Primary | ICD-10-CM

## 2020-11-02 DIAGNOSIS — I82.409 RECURRENT DEEP VEIN THROMBOSIS (DVT) (HCC): ICD-10-CM

## 2020-11-02 DIAGNOSIS — Z85.46 HISTORY OF PROSTATE CANCER: ICD-10-CM

## 2020-11-02 LAB
25(OH)D3 SERPL-MCNC: 36 NG/ML (ref 30–96)
A-G RATIO,AGRAT: 1.8 RATIO
ALBUMIN SERPL-MCNC: 4.3 G/DL (ref 3.9–5.4)
ALP SERPL-CCNC: 63 U/L (ref 38–126)
ALT SERPL-CCNC: 17 U/L (ref 0–50)
ANION GAP SERPL CALC-SCNC: 8 MMOL/L
AST SERPL W P-5'-P-CCNC: 24 U/L (ref 14–36)
BILIRUB SERPL-MCNC: 0.7 MG/DL (ref 0.2–1.3)
BILIRUB UR QL: NEGATIVE
BUN SERPL-MCNC: 19 MG/DL (ref 9–20)
BUN/CREATININE RATIO,BUCR: 19 RATIO
CALCIUM SERPL-MCNC: 9.9 MG/DL (ref 8.4–10.2)
CHLORIDE SERPL-SCNC: 107 MMOL/L (ref 98–107)
CHOL/HDL RATIO,CHHD: 2 RATIO (ref 0–4)
CHOLEST SERPL-MCNC: 157 MG/DL (ref 0–200)
CK SERPL-CCNC: 94 U/L (ref 30–135)
CLARITY: CLEAR
CO2 SERPL-SCNC: 31 MMOL/L (ref 22–32)
COLOR UR: NORMAL
CREAT SERPL-MCNC: 1 MG/DL (ref 0.8–1.5)
ERYTHROCYTE [DISTWIDTH] IN BLOOD BY AUTOMATED COUNT: 14.7 %
GLOBULIN,GLOB: 2.4
GLUCOSE 24H UR-MRATE: NEGATIVE G/(24.H)
GLUCOSE SERPL-MCNC: 94 MG/DL (ref 75–110)
HCT VFR BLD AUTO: 44.3 % (ref 37–51)
HDLC SERPL-MCNC: 75 MG/DL (ref 35–130)
HGB BLD-MCNC: 14.8 G/DL (ref 12–18)
HGB UR QL STRIP: NEGATIVE
KETONES UR QL STRIP.AUTO: NEGATIVE
LDL/HDL RATIO,LDHD: 1 RATIO
LDLC SERPL CALC-MCNC: 64 MG/DL (ref 0–130)
LEUKOCYTE ESTERASE: NEGATIVE
LYMPHOCYTES ABSOLUTE: 1.5 K/UL (ref 0.6–4.1)
LYMPHOCYTES NFR BLD: 30.6 % (ref 10–58.5)
MCH RBC QN AUTO: 29.8 PG (ref 26–32)
MCHC RBC AUTO-ENTMCNC: 33.4 G/DL (ref 30–36)
MCV RBC AUTO: 89.3 FL (ref 80–97)
MONOCYTES ABS-DIF,2141: 0.4 K/UL (ref 0–1.8)
MONOCYTES NFR BLD: 8.3 % (ref 0.1–24)
NEUTROPHILS # BLD: 61.1 % (ref 37–92)
NEUTROPHILS ABS,2156: 3.1 K/UL (ref 2–7.8)
NITRITE UR QL STRIP.AUTO: NEGATIVE
PH UR STRIP: 7 [PH] (ref 5–7)
PLATELET # BLD AUTO: 151 K/UL (ref 140–440)
POTASSIUM SERPL-SCNC: 4.4 MMOL/L (ref 3.6–5)
PROT SERPL-MCNC: 6.7 G/DL (ref 6.3–8.2)
PROT UR STRIP-MCNC: NEGATIVE MG/DL
RBC # BLD AUTO: 4.96 M/UL (ref 4.2–6.3)
RBC #/AREA URNS HPF: 0 #/HPF
SODIUM SERPL-SCNC: 146 MMOL/L (ref 137–145)
SP GR UR REFRACTOMETRY: 1.01 (ref 1–1.03)
TRIGL SERPL-MCNC: 92 MG/DL (ref 0–200)
TSH SERPL DL<=0.05 MIU/L-ACNC: 1.5 UIU/ML (ref 0.34–5.6)
UROBILINOGEN UR QL STRIP.AUTO: NEGATIVE
VLDLC SERPL CALC-MCNC: 18 MG/DL
WBC # BLD AUTO: 5 K/UL (ref 4.1–10.9)
WBC URNS QL MICRO: 0 #/HPF

## 2020-11-02 PROCEDURE — G8420 CALC BMI NORM PARAMETERS: HCPCS | Performed by: INTERNAL MEDICINE

## 2020-11-02 PROCEDURE — 82550 ASSAY OF CK (CPK): CPT | Performed by: INTERNAL MEDICINE

## 2020-11-02 PROCEDURE — 1101F PT FALLS ASSESS-DOCD LE1/YR: CPT | Performed by: INTERNAL MEDICINE

## 2020-11-02 PROCEDURE — 82306 VITAMIN D 25 HYDROXY: CPT | Performed by: INTERNAL MEDICINE

## 2020-11-02 PROCEDURE — 85025 COMPLETE CBC W/AUTO DIFF WBC: CPT | Performed by: INTERNAL MEDICINE

## 2020-11-02 PROCEDURE — 36415 COLL VENOUS BLD VENIPUNCTURE: CPT | Performed by: INTERNAL MEDICINE

## 2020-11-02 PROCEDURE — G0439 PPPS, SUBSEQ VISIT: HCPCS | Performed by: INTERNAL MEDICINE

## 2020-11-02 PROCEDURE — G8427 DOCREV CUR MEDS BY ELIG CLIN: HCPCS | Performed by: INTERNAL MEDICINE

## 2020-11-02 PROCEDURE — 84443 ASSAY THYROID STIM HORMONE: CPT | Performed by: INTERNAL MEDICINE

## 2020-11-02 PROCEDURE — G8510 SCR DEP NEG, NO PLAN REQD: HCPCS | Performed by: INTERNAL MEDICINE

## 2020-11-02 PROCEDURE — G8536 NO DOC ELDER MAL SCRN: HCPCS | Performed by: INTERNAL MEDICINE

## 2020-11-02 PROCEDURE — 81001 URINALYSIS AUTO W/SCOPE: CPT | Performed by: INTERNAL MEDICINE

## 2020-11-02 PROCEDURE — 99214 OFFICE O/P EST MOD 30 MIN: CPT | Performed by: INTERNAL MEDICINE

## 2020-11-02 PROCEDURE — 80061 LIPID PANEL: CPT | Performed by: INTERNAL MEDICINE

## 2020-11-02 PROCEDURE — 80053 COMPREHEN METABOLIC PANEL: CPT | Performed by: INTERNAL MEDICINE

## 2020-11-02 NOTE — PATIENT INSTRUCTIONS
DASH Diet: Care Instructions Your Care Instructions The DASH diet is an eating plan that can help lower your blood pressure. DASH stands for Dietary Approaches to Stop Hypertension. Hypertension is high blood pressure. The DASH diet focuses on eating foods that are high in calcium, potassium, and magnesium. These nutrients can lower blood pressure. The foods that are highest in these nutrients are fruits, vegetables, low-fat dairy products, nuts, seeds, and legumes. But taking calcium, potassium, and magnesium supplements instead of eating foods that are high in those nutrients does not have the same effect. The DASH diet also includes whole grains, fish, and poultry. The DASH diet is one of several lifestyle changes your doctor may recommend to lower your high blood pressure. Your doctor may also want you to decrease the amount of sodium in your diet. Lowering sodium while following the DASH diet can lower blood pressure even further than just the DASH diet alone. Follow-up care is a key part of your treatment and safety. Be sure to make and go to all appointments, and call your doctor if you are having problems. It's also a good idea to know your test results and keep a list of the medicines you take. How can you care for yourself at home? Following the DASH diet · Eat 4 to 5 servings of fruit each day. A serving is 1 medium-sized piece of fruit, ½ cup chopped or canned fruit, 1/4 cup dried fruit, or 4 ounces (½ cup) of fruit juice. Choose fruit more often than fruit juice. · Eat 4 to 5 servings of vegetables each day. A serving is 1 cup of lettuce or raw leafy vegetables, ½ cup of chopped or cooked vegetables, or 4 ounces (½ cup) of vegetable juice. Choose vegetables more often than vegetable juice. · Get 2 to 3 servings of low-fat and fat-free dairy each day. A serving is 8 ounces of milk, 1 cup of yogurt, or 1 ½ ounces of cheese. · Eat 6 to 8 servings of grains each day. A serving is 1 slice of bread, 1 ounce of dry cereal, or ½ cup of cooked rice, pasta, or cooked cereal. Try to choose whole-grain products as much as possible. · Limit lean meat, poultry, and fish to 2 servings each day. A serving is 3 ounces, about the size of a deck of cards. · Eat 4 to 5 servings of nuts, seeds, and legumes (cooked dried beans, lentils, and split peas) each week. A serving is 1/3 cup of nuts, 2 tablespoons of seeds, or ½ cup of cooked beans or peas. · Limit fats and oils to 2 to 3 servings each day. A serving is 1 teaspoon of vegetable oil or 2 tablespoons of salad dressing. · Limit sweets and added sugars to 5 servings or less a week. A serving is 1 tablespoon jelly or jam, ½ cup sorbet, or 1 cup of lemonade. · Eat less than 2,300 milligrams (mg) of sodium a day. If you limit your sodium to 1,500 mg a day, you can lower your blood pressure even more. Tips for success · Start small. Do not try to make dramatic changes to your diet all at once. You might feel that you are missing out on your favorite foods and then be more likely to not follow the plan. Make small changes, and stick with them. Once those changes become habit, add a few more changes. · Try some of the following: ? Make it a goal to eat a fruit or vegetable at every meal and at snacks. This will make it easy to get the recommended amount of fruits and vegetables each day. ? Try yogurt topped with fruit and nuts for a snack or healthy dessert. ? Add lettuce, tomato, cucumber, and onion to sandwiches. ? Combine a ready-made pizza crust with low-fat mozzarella cheese and lots of vegetable toppings. Try using tomatoes, squash, spinach, broccoli, carrots, cauliflower, and onions. ? Have a variety of cut-up vegetables with a low-fat dip as an appetizer instead of chips and dip. ? Sprinkle sunflower seeds or chopped almonds over salads.  Or try adding chopped walnuts or almonds to cooked vegetables. ? Try some vegetarian meals using beans and peas. Add garbanzo or kidney beans to salads. Make burritos and tacos with mashed seymour beans or black beans. Where can you learn more? Go to http://www.gray.com/ Enter L981 in the search box to learn more about \"DASH Diet: Care Instructions. \" Current as of: December 16, 2019               Content Version: 12.6 © 0526-6752 [employer], [employer]. Care instructions adapted under license by [employer] (which disclaims liability or warranty for this information). If you have questions about a medical condition or this instruction, always ask your healthcare professional. [address] any warranty or liability for your use of this information.

## 2020-11-02 NOTE — PROGRESS NOTES
Madeleine López is a [de-identified] y.o. male and presents with Annual Wellness Visit (medicare wellness visit) and Follow Up Chronic Condition  . Subjective:  Mr. Ángela Patrick presents to the office today for Medicare wellness check and follow-up of multiple medical problems. The patient has hypercholesterolemia currently on pravastatin therapy. He tolerates this without muscle soreness or GI upset. He has no history of coronary artery disease and denies exertional chest pains or claudication. The patient has factor V Leiden mutation and a history of recurrent DVT. He is on chronic Xarelto for this. The patient has had no bleeding issues and has had no recurrent clot-like symptoms. He has vitamin D deficiency and was taking the high-dose once every other week supplement which we increased at the last office visit due to continued low levels of vitamin D. The patient gives no history of osteoporosis, falls or fractures. He has a history of renal cell carcinoma and prostate cancer for which she is followed by Dr. Favio Oneill on a 6-month basis. He generally has follow-up ultrasounds, chest x-ray, etc. and has had no recurrence of disease.     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    Factor V Leiden mutation ([facility] 10/3/2017    Gastroesophageal reflux disease without esophagitis 10/3/2017    Herpes zoster virus infection of face and ear nerves 10/3/2017    Hypercholesterolemia 10/3/2017    Long-term (current) use of anticoagulants 10/3/2017    Long-term use of high-risk medication 10/3/2017    Lumbar sprain 10/3/2017    Mid back pain 12/14/2018    Osteopenia 10/3/2017    Other ill-defined conditions(799.89)     High Cholesterol    Other ill-defined conditions(799.89)     doctor does not want him to have quinolones family -causes achilles tendonitis    Prostatic cancer ([facility] 10/3/2017    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/2017    Recurrent deep vein thrombosis (DVT) ([facility] 10/3/2017    Renal cell cancer ([facility] 10/3/2017    Thromboembolus ([facility]     DVT    Toe fracture, left 10/3/2017    Vitamin D deficiency 10/3/2017     Past Surgical History:   Procedure Laterality Date    COLORECTAL SCRN; HI RISK IND  2/3/2015         HX ORTHOPAEDIC      cortisone shot in knee and elbow    HX OTHER SURGICAL      colonoscopy    HX TONSILLECTOMY      HX UROLOGICAL Right     Kidney Removal     Allergies   Allergen Reactions    Quinolones Unknown (comments)     Current Outpatient Medications   Medication Sig Dispense Refill    ergocalciferol (ERGOCALCIFEROL) 1,250 mcg (50,000 unit) capsule Take 1 Cap by mouth every seven (7) days. 12 Cap 3    Xarelto 20 mg tab tablet TAKE ONE TABLET BY MOUTH DAILY 30 Tab 5    diclofenac (VOLTAREN) 1 % gel Apply 2 g to affected area four (4) times daily. 100 g 1    triamcinolone acetonide (KENALOG) 0.1 % topical cream Apply  to affected area two (2) times a day. use thin layer 15 g 0    pravastatin (PRAVACHOL) 40 mg tablet TAKE ONE TABLET BY MOUTH DAILY 90 Tab 1    cyanocobalamin (VITAMIN B-12) 500 mcg tablet Take 500 mcg by mouth daily.  calcium carbonate (TUMS) 200 mg calcium (500 mg) chew Take 2 tablets by mouth daily.  calcium-cholecalciferol, d3, (CALCIUM 600 + D) 600-125 mg-unit tab Take 1 tablet by mouth daily.  acetaminophen (TYLENOL EXTRA STRENGTH) 500 mg tablet Take 1,000 mg by mouth every six (6) hours as needed for Pain.  diphenhydrAMINE (BENADRYL) 25 mg capsule Take 25 mg by mouth every six (6) hours as needed for Itching or Sleep (allergies).        Social History     Socioeconomic History    Marital status:  Spouse name: Not on file    Number of children: Not on file    Years of education: Not on file    Highest education level: Not on file   Tobacco Use    Smoking status: Never Smoker    Smokeless tobacco: Never Used   Substance and Sexual Activity    Alcohol use: No    Drug use: No     Family History   Problem Relation Age of Onset    Hypertension Mother    Oscar Olp Other Mother         abdominal aneurysm    Cancer Father         bladder        Health Maintenance   Topic Date Due    Medicare Yearly Exam  09/12/2020    Lipid Screen  05/01/2021    GLAUCOMA SCREENING Q2Y  08/08/2021    DTaP/Tdap/Td series (2 - Td) 08/02/2028    Shingrix Vaccine Age 50>  Completed    Flu Vaccine  Completed    Pneumococcal 65+ years  Completed        Review of Systems  Constitutional: negative for fevers, chills, anorexia and weight loss  Eyes:   negative for visual disturbance and irritation  ENT:   negative for tinnitus,sore throat,nasal congestion,ear pain,hoarseness  Respiratory:  negative for cough, hemoptysis, dyspnea,wheezing  CV:   negative for chest pain, palpitations, lower extremity edema  GI:   negative for nausea, vomiting, diarrhea, abdominal pain,melena  Endo:               negative for polyuria,polydipsia,polyphagia,heat intolerance  Genitourinary: negative for frequency, dysuria and hematuria  Integumentary: negative for rash and pruritus  Hematologic:  negative for easy bruising and gum/nose bleeding  Musculoskel: Positive for chronic arthritic pain and stiffness of the knees  Neurological:  negative for headaches, dizziness, vertigo, memory problems and gait   Behavl/Psych: negative for feelings of anxiety, depression, mood changes  ROS otherwise negative      Objective:  Visit Vitals  /72 (BP 1 Location: Left arm, BP Patient Position: Sitting)   Pulse 65   Temp 98.1 °F (36.7 °C) (Oral)   Resp 16   Ht 6' 1\" (1.854 m)   Wt 171 lb 6.4 oz (77.7 kg)   SpO2 97%   BMI 22.61 kg/m²     Body mass index is 22.61 kg/m². Physical Exam:   General appearance - alert, well appearing, and in no distress  Mental status - alert, oriented to person, place, and time  EYE-BLAS, EOMI,conjunctiva normal bilaterally, lids normal  ENT-ENT exam normal, no neck nodes or sinus tenderness  Nose - normal and patent, no erythema,  Or discharge   Mouth - mucous membranes moist, pharynx normal without lesions  Neck - supple, no significant adenopathy or bruit  Chest - clear to auscultation, no wheezes, rales or rhonchi. Heart - normal rate, regular rhythm, normal S1, S2, no murmurs, rubs, clicks or gallops   Abdomen - soft, nontender, nondistended, no masses or organomegaly  Lymph- no adenopathy palpable  Ext-peripheral pulses normal, no pedal edema, no clubbing or cyanosis  Skin-Warm and dry. no hyperpigmentation, vitiligo, or suspicious lesions  Neuro -alert, oriented, normal speech, no focal findings or movement disorder noted    In addition this patient is seen for AWV  as detailed below: This is a Subsequent Medicare Annual Wellness Exam (AWV) (Performed 12 months after IPPE or effective date of Medicare Part B enrollment)    I have reviewed the patient's medical history in detail and updated the computerized patient record. Problem list reviewed with patient and risk factors discussed. PSH, SH, FH, Medications and HM issues also reviewed and discussed. Depression screen, fall risk assessment, functional abilities and ACP also reviewed and discussed as above and below. Depression Risk Factor Screening:     3 most recent PHQ Screens 11/2/2020   Little interest or pleasure in doing things Not at all   Feeling down, depressed, irritable, or hopeless Not at all   Total Score PHQ 2 0     Alcohol Risk Factor Screening: You do not drink alcohol or very rarely. Functional Ability and Level of Safety:   Hearing Loss  The patient wears hearing aids.     Activities of Daily Living  The home contains: handrails and grab bars  Patient does total self care    Fall Risk  Fall Risk Assessment, last 12 mths 11/2/2020   Able to walk? Yes   Fall in past 12 months? No       Abuse Screen  Patient is not abused    Cognitive Screening   Evaluation of Cognitive Function:  Has your family/caregiver stated any concerns about your memory: no  Normal    Patient Care Team   Patient Care Team:  Amy Coelho MD as PCP - General (Internal Medicine)  Amy Coelho MD as PCP - [address] [place] [address] [facility]  Valentino Converse, MD (Endocrinology)  aMra Duarte MD (Urology)  Carl Boateng DPM (Podiatry)    Assessment/Plan   Education and counseling provided:  Are appropriate based on today's review and evaluation    Assessment/Plan:   Impressions:      ICD-10-CM ICD-9-CM    1. Medicare annual wellness visit, subsequent  Z00.00 V70.0    2. Hypercholesterolemia  E78.00 272.0 COLLECTION VENOUS BLOOD,VENIPUNCTURE      LIPID PANEL      TSH 3RD GENERATION   3. Long-term use of high-risk medication  Z79.899 V58.69 CBC WITH AUTOMATED DIFF      CK      METABOLIC PANEL, COMPREHENSIVE   4. Vitamin D deficiency  E55.9 268.9 VITAMIN D, 25 HYDROXY   5. Factor V Leiden mutation ([facility]  D68.51 289.81    6. Long term current use of anticoagulant therapy  Z79.01 V58.61    7. Recurrent deep vein thrombosis (DVT) (HCC)  I82.409 453.40    8. Renal cell carcinoma, unspecified laterality (HCC)  C64.9 189.0 URINALYSIS W/MICROSCOPIC   9. History of prostate cancer  Z85.46 V10.46         Plan:  1. Continue present meds  2. Lifestyle modifications including Na restriction, low carb/fat diet, weight reduction and exercise (at least a walking program). Follow-up and Dispositions    · Return in about 6 months (around 5/2/2021).            Orders Placed This Encounter    CBC WITH AUTOMATED DIFF ([facility])    CK ([facility])    LIPID PANEL ([facility])    [facility]    TSH 3RD GENERATION ([facility])    VITAMIN D, 25 HYDROXY ([facility])    URINALYSIS W/MICROSCOPIC ([place] [facility])    COLLECTION VENOUS BLOOD,VENIPUNCTURE       Becky Huston MD   Assessment/Plan:  Diagnoses and all orders for this visit:    Medicare annual wellness visit, subsequent    Hypercholesterolemia  -     COLLECTION VENOUS BLOOD,VENIPUNCTURE  -     LIPID PANEL  -     TSH 3RD GENERATION    Long-term use of high-risk medication  -     CBC WITH AUTOMATED DIFF  -     CK  -     METABOLIC PANEL, COMPREHENSIVE    Vitamin D deficiency  -     VITAMIN D, 25 HYDROXY    Factor V Leiden mutation ([facility]    Long term current use of anticoagulant therapy    Recurrent deep vein thrombosis (DVT) (HCC)    Renal cell carcinoma, unspecified laterality ([facility]  -     URINALYSIS W/MICROSCOPIC    History of prostate cancer        Health Maintenance Due   Topic Date Due    Medicare Yearly Exam  09/12/2020       Other instructions: The patient's medications were reviewed and reconciled. No change in his current medical regimen will be made. A prudent diet and exercise is encouraged. Patient mentions that he has been having arthritic stiffness of his knees and have recommended Tylenol for discomfort and turmeric as a supplement to see if this will help his arthritic pain. Health maintenance issues were reviewed and are up-to-date    Age-appropriate vaccinations were reviewed and are up-to-date    Await results of multiple labs    Follow-up 6 months    Follow-up and Dispositions    · Return in about 6 months (around 5/2/2021). I have reviewed with the patient details of the assessment and plan and all questions were answered. Relevent patient education was performed. The most recent lab findings were reviewed with the patient. An After Visit Summary was printed and given to the patient.     Becky Huston MD    Please note that this dictation was completed with [employer], the computer voice recognition software. Quite often unanticipated grammatical, syntax, homophones, and other interpretive errors are inadvertently transcribed by the computer software. Please disregard these errors. Please excuse any errors that have escaped final proofreading.

## 2020-11-02 NOTE — PROGRESS NOTES
Chief Complaint   Patient presents with   [facility] Annual Wellness Visit     medicare wellness visit    Follow Up Chronic Condition       Depression Risk Factor Screening:     3 most recent PHQ Screens 11/2/2020   Little interest or pleasure in doing things Not at all   Feeling down, depressed, irritable, or hopeless Not at all   Total Score PHQ 2 0       Functional Ability and Level of Safety:     Activities of Daily Living  ADL Assessment 11/2/2020   Feeding yourself No Help Needed   Getting from bed to chair No Help Needed   Getting dressed No Help Needed   Bathing or showering No Help Needed   Walk across the room (includes cane/walker) No Help Needed   Using the telphone No Help Needed   Taking your medications No Help Needed   Preparing meals No Help Needed   Managing money (expenses/bills) No Help Needed   Moderately strenuous housework (laundry) No Help Needed   Shopping for personal items (toiletries/medicines) No Help Needed   Shopping for groceries No Help Needed   Driving No Help Needed   Climbing a flight of stairs No Help Needed   Getting to places beyond walking distances No Help Needed       Fall Risk  Fall Risk Assessment, last 12 mths 11/2/2020   Able to walk? Yes   Fall in past 12 months? No       Abuse Screen  Abuse Screening Questionnaire 11/2/2020   Do you ever feel afraid of your partner? N   Are you in a relationship with someone who physically or mentally threatens you? N   Is it safe for you to go home?  Y         Patient Care Team   Patient Care Team:  Sarah Lewis MD as PCP - General (Internal Medicine)  Sarah Lewis MD as PCP - [facility] Provider  Maryann Gavin MD (Endocrinology)  Jovi Sharma MD (Urology)  Yesica Eli DPM (Podiatry)

## 2020-11-04 RX ORDER — RIVAROXABAN 20 MG/1
TABLET, FILM COATED ORAL
Qty: 30 TAB | Refills: 4 | Status: SHIPPED | OUTPATIENT
Start: 2020-11-04 | End: 2021-02-03

## 2021-01-21 RX ORDER — PRAVASTATIN SODIUM 40 MG/1
TABLET ORAL
Qty: 90 TAB | Refills: 2 | Status: SHIPPED | OUTPATIENT
Start: 2021-01-21 | End: 2021-10-23

## 2021-03-23 ENCOUNTER — HOSPITAL ENCOUNTER (OUTPATIENT)
Dept: GENERAL RADIOLOGY | Age: 81
Discharge: HOME | End: 2021-03-23
Payer: MEDICARE

## 2021-03-23 ENCOUNTER — TRANSCRIBE ORDER (OUTPATIENT)
Dept: REGISTRATION | Age: 81
End: 2021-03-23

## 2021-03-23 DIAGNOSIS — Z85.528 PERSONAL HISTORY OF RENAL CANCER: Primary | ICD-10-CM

## 2021-03-23 DIAGNOSIS — Z85.528 PERSONAL HISTORY OF KIDNEY CANCER: ICD-10-CM

## 2021-03-23 PROCEDURE — 71046 X-RAY EXAM CHEST 2 VIEWS: CPT

## 2021-04-09 RX ORDER — ERGOCALCIFEROL 1.25 MG/1
CAPSULE ORAL
Qty: 12 CAP | Refills: 2 | Status: SHIPPED | OUTPATIENT
Start: 2021-04-09 | End: 2021-12-25

## 2021-05-04 ENCOUNTER — OFFICE VISIT (OUTPATIENT)
Dept: INTERNAL MEDICINE CLINIC | Age: 81
End: 2021-05-04
Payer: COMMERCIAL

## 2021-05-04 VITALS
OXYGEN SATURATION: 97 % | HEIGHT: 73 IN | SYSTOLIC BLOOD PRESSURE: 118 MMHG | HEART RATE: 67 BPM | WEIGHT: 171.2 LBS | TEMPERATURE: 97.8 F | DIASTOLIC BLOOD PRESSURE: 78 MMHG | BODY MASS INDEX: 22.69 KG/M2 | RESPIRATION RATE: 16 BRPM

## 2021-05-04 DIAGNOSIS — E78.00 HYPERCHOLESTEROLEMIA: Primary | Chronic | ICD-10-CM

## 2021-05-04 DIAGNOSIS — Z79.899 LONG-TERM USE OF HIGH-RISK MEDICATION: Chronic | ICD-10-CM

## 2021-05-04 DIAGNOSIS — I82.409 RECURRENT DEEP VEIN THROMBOSIS (DVT) (HCC): ICD-10-CM

## 2021-05-04 DIAGNOSIS — Z85.46 HISTORY OF PROSTATE CANCER: ICD-10-CM

## 2021-05-04 DIAGNOSIS — M17.11 PRIMARY OSTEOARTHRITIS OF RIGHT KNEE: ICD-10-CM

## 2021-05-04 DIAGNOSIS — C64.9 RENAL CELL CARCINOMA, UNSPECIFIED LATERALITY (HCC): ICD-10-CM

## 2021-05-04 DIAGNOSIS — Z79.01 LONG TERM CURRENT USE OF ANTICOAGULANT THERAPY: Chronic | ICD-10-CM

## 2021-05-04 DIAGNOSIS — E55.9 VITAMIN D DEFICIENCY: Chronic | ICD-10-CM

## 2021-05-04 DIAGNOSIS — D68.51 FACTOR V LEIDEN MUTATION (HCC): Chronic | ICD-10-CM

## 2021-05-04 PROCEDURE — G8536 NO DOC ELDER MAL SCRN: HCPCS | Performed by: INTERNAL MEDICINE

## 2021-05-04 PROCEDURE — 99214 OFFICE O/P EST MOD 30 MIN: CPT | Performed by: INTERNAL MEDICINE

## 2021-05-04 PROCEDURE — G8510 SCR DEP NEG, NO PLAN REQD: HCPCS | Performed by: INTERNAL MEDICINE

## 2021-05-04 PROCEDURE — G8420 CALC BMI NORM PARAMETERS: HCPCS | Performed by: INTERNAL MEDICINE

## 2021-05-04 PROCEDURE — G8427 DOCREV CUR MEDS BY ELIG CLIN: HCPCS | Performed by: INTERNAL MEDICINE

## 2021-05-04 PROCEDURE — 1101F PT FALLS ASSESS-DOCD LE1/YR: CPT | Performed by: INTERNAL MEDICINE

## 2021-05-04 NOTE — PROGRESS NOTES
Dayo Ulrich is a [de-identified] y.o. male and presents with Follow Up Chronic Condition (6 mo fu)  . Subjective:  Mr. Raymundo Rodrigez presents to the office today in follow-up of multiple medical problems. Patient has hypercholesterolemia currently on statin therapy. He tolerates this without muscle soreness or GI upset. He remains physically active. He has no history of vascular disease and denies exertional chest pains or claudication. Patient has vitamin D deficiency and remains on supplementation for this as well. This causes no GI upset. He does take a calcium supplement in addition. His last vitamin D level was 36 6 months ago. The patient has factor V Leiden deficiency and a history of recurrent DVT. He remains on daily Xarelto. He denies any bleeding issues and has had no recurrent clot symptoms. He is followed by Dr. Mary Lee for both renal cell cancer and prostate cancer. Patient has been checked within the last 6 months and has had no evidence of any recurrence of the disease. The patient complains of right knee pain. He notes that it has been hurting especially when he is trying to golf or walk. He had previously been followed by Dr. Mara Cruz and had cortisone injections in the past.  He is unable to take anti-inflammatory medication due to his chronic anticoagulation.     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    Factor V Leiden mutation ([facility] 10/3/2017    Gastroesophageal reflux disease without esophagitis 10/3/2017    Herpes zoster virus infection of face and ear nerves 10/3/2017    Hypercholesterolemia 10/3/2017    Long-term (current) use of anticoagulants 10/3/2017    Long-term use of high-risk medication 10/3/2017    Lumbar sprain 10/3/2017    Mid back pain 12/14/2018    Osteopenia 10/3/2017    Other ill-defined conditions(799.89)     High Cholesterol    Other ill-defined conditions(799.89)     doctor does not want him to have quinolones family -causes achilles tendonitis    Prostatic cancer ([facility] 10/3/2017    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/2017    Recurrent deep vein thrombosis (DVT) ([facility] 10/3/2017    Renal cell cancer ([facility] 10/3/2017    Thromboembolus ([facility]     DVT    Toe fracture, left 10/3/2017    Vitamin D deficiency 10/3/2017     Past Surgical History:   Procedure Laterality Date    COLORECTAL SCRN; HI RISK IND  2/3/2015         HX ORTHOPAEDIC      cortisone shot in knee and elbow    HX OTHER SURGICAL      colonoscopy    HX TONSILLECTOMY      HX UROLOGICAL Right     Kidney Removal     Allergies   Allergen Reactions    Quinolones Unknown (comments)     Current Outpatient Medications   Medication Sig Dispense Refill    ergocalciferol (ERGOCALCIFEROL) 1,250 mcg (50,000 unit) capsule TAKE ONE CAPSULE BY MOUTH ONCE WEEKLY 12 Cap 2    Xarelto 20 mg tab tablet TAKE ONE TABLET BY MOUTH DAILY 90 Tab 2    pravastatin (PRAVACHOL) 40 mg tablet TAKE ONE TABLET BY MOUTH DAILY 90 Tab 2    triamcinolone acetonide (KENALOG) 0.1 % topical cream Apply  to affected area two (2) times a day. use thin layer 15 g 0    cyanocobalamin (VITAMIN B-12) 500 mcg tablet Take 500 mcg by mouth daily.  calcium carbonate (TUMS) 200 mg calcium (500 mg) chew Take 2 tablets by mouth daily.  acetaminophen (TYLENOL EXTRA STRENGTH) 500 mg tablet Take 1,000 mg by mouth every six (6) hours as needed for Pain.  diphenhydrAMINE (BENADRYL) 25 mg capsule Take 25 mg by mouth every six (6) hours as needed for Itching or Sleep (allergies).  calcium-cholecalciferol, d3, (CALCIUM 600 + D) 600-125 mg-unit tab Take 1 tablet by mouth daily. Social History     Socioeconomic History    Marital status:      Spouse name: Not on file    Number of children: Not on file    Years of education: Not on file    Highest education level: Not on file   Tobacco Use    Smoking status: Never Smoker    Smokeless tobacco: Never Used   Substance and Sexual Activity    Alcohol use: No    Drug use: No     Family History   Problem Relation Age of Onset    Hypertension Mother    Tiago Curran Other Mother         abdominal aneurysm    Cancer Father         bladder        Health Maintenance   Topic Date Due    Lipid Screen  11/02/2021    Medicare Yearly Exam  11/03/2021    DTaP/Tdap/Td series (2 - Td) 08/02/2028    Shingrix Vaccine Age 50>  Completed    Flu Vaccine  Completed    COVID-19 Vaccine  Completed    Pneumococcal 65+ years  Completed        Review of Systems  Constitutional: negative for fevers, chills, anorexia and weight loss  Eyes:   negative for visual disturbance and irritation  ENT:   negative for tinnitus,sore throat,nasal congestion,ear pain,hoarseness  Respiratory:  negative for cough, hemoptysis, dyspnea,wheezing  CV:   negative for chest pain, palpitations, lower extremity edema  GI:   negative for nausea, vomiting, diarrhea, abdominal pain,melena  Endo:               negative for polyuria,polydipsia,polyphagia,heat intolerance  Genitourinary: negative for frequency, dysuria and hematuria  Integumentary: negative for rash and pruritus  Hematologic:  negative for easy bruising and gum/nose bleeding  Musculoskel: Complains of right knee pain, stiffness and swelling  Neurological:  negative for headaches, dizziness, vertigo, memory problems and gait   Behavl/Psych: negative for feelings of anxiety, depression, mood changes  ROS otherwise negative      Objective:  Visit Vitals  /78 (BP 1 Location: Left upper arm, BP Patient Position: Sitting, BP Cuff Size: Adult)   Pulse 67   Temp 97.8 °F (36.6 °C) (Temporal)   Resp 16   Ht 6' 1\" (1.854 m) Wt 171 lb 3.2 oz (77.7 kg)   SpO2 97%   BMI 22.59 kg/m²     Body mass index is 22.59 kg/m². Physical Exam:   General appearance - alert, well appearing, and in no distress  Mental status - alert, oriented to person, place, and time  EYE-BLAS, EOMI,conjunctiva normal bilaterally, lids normal  ENT-ENT exam normal, no neck nodes or sinus tenderness  Nose - normal and patent, no erythema,  Or discharge   Mouth - mucous membranes moist, pharynx normal without lesions  Neck - supple, no significant adenopathy or bruit  Chest - clear to auscultation, no wheezes, rales or rhonchi. Heart - normal rate, regular rhythm, normal S1, S2, no murmurs, rubs, clicks or gallops   Abdomen - soft, nontender, nondistended, no masses or organomegaly  Lymph- no adenopathy palpable  Ext-patient is unable to fully straighten the knee and it appears to be in about 15 degrees of flexion when he fully extends it. Degenerative swelling of the knee noted without effusion. Skin-Warm and dry. no hyperpigmentation, vitiligo, or suspicious lesions  Neuro -alert, oriented, normal speech, no focal findings or movement disorder noted      Assessment/Plan:  Diagnoses and all orders for this visit:    Hypercholesterolemia    Long-term use of high-risk medication    Factor V Leiden mutation ([facility]    Long term current use of anticoagulant therapy    Vitamin D deficiency    Renal cell carcinoma, unspecified laterality (HCC)    Recurrent deep vein thrombosis (DVT) (HCC)    History of prostate cancer    Primary osteoarthritis of right knee        Other instructions: The patient's medications were reviewed and reconciled. No change in his current medical regimen will be made. A no added salt, prudent diet is encouraged    I have recommended orthopedic evaluation of his right knee due to his degenerative swelling and knee lack of full extension in his knee. Labs from 11/2 were reviewed with the patient today.     Health maintenance issues and age appropriate vaccinations are up-to-date    Follow-up in 6 months    Follow-up and Dispositions    · Return in about 6 months (around 11/4/2021). I have reviewed with the patient details of the assessment and plan and all questions were answered. Relevent patient education was performed. The most recent lab findings were reviewed with the patient. An After Visit Summary was printed and given to the patient. Jovanni Butt MD    Please note that this dictation was completed with [employer], the computer voice recognition software. Quite often unanticipated grammatical, syntax, homophones, and other interpretive errors are inadvertently transcribed by the computer software. Please disregard these errors. Please excuse any errors that have escaped final proofreading.

## 2021-05-04 NOTE — PROGRESS NOTES
Thalia Yoon is a [de-identified] y.o. male presenting for Follow Up Chronic Condition (6 mo fu)  . 1. Have you been to the ER, urgent care clinic since your last visit? Hospitalized since your last visit? No    2. Have you seen or consulted any other health care providers outside of the [address] [place] [address] since your last visit? Include any pap smears or colon screening. Dr Winnifred Lennox, last 12 mths 5/4/2021   Able to walk? Yes   Fall in past 12 months? 0   Do you feel unsteady? 0   Are you worried about falling 0         Abuse Screening Questionnaire 5/4/2021   Do you ever feel afraid of your partner? N   Are you in a relationship with someone who physically or mentally threatens you? N   Is it safe for you to go home? Y       3 most recent PHQ Screens 5/4/2021   Little interest or pleasure in doing things Not at all   Feeling down, depressed, irritable, or hopeless Not at all   Total Score PHQ 2 0       There are no discontinued medications.

## 2021-05-04 NOTE — PATIENT INSTRUCTIONS
DASH Diet: Care Instructions Your Care Instructions The DASH diet is an eating plan that can help lower your blood pressure. DASH stands for Dietary Approaches to Stop Hypertension. Hypertension is high blood pressure. The DASH diet focuses on eating foods that are high in calcium, potassium, and magnesium. These nutrients can lower blood pressure. The foods that are highest in these nutrients are fruits, vegetables, low-fat dairy products, nuts, seeds, and legumes. But taking calcium, potassium, and magnesium supplements instead of eating foods that are high in those nutrients does not have the same effect. The DASH diet also includes whole grains, fish, and poultry. The DASH diet is one of several lifestyle changes your doctor may recommend to lower your high blood pressure. Your doctor may also want you to decrease the amount of sodium in your diet. Lowering sodium while following the DASH diet can lower blood pressure even further than just the DASH diet alone. Follow-up care is a key part of your treatment and safety. Be sure to make and go to all appointments, and call your doctor if you are having problems. It's also a good idea to know your test results and keep a list of the medicines you take. How can you care for yourself at home? Following the DASH diet · Eat 4 to 5 servings of fruit each day. A serving is 1 medium-sized piece of fruit, ½ cup chopped or canned fruit, 1/4 cup dried fruit, or 4 ounces (½ cup) of fruit juice. Choose fruit more often than fruit juice. · Eat 4 to 5 servings of vegetables each day. A serving is 1 cup of lettuce or raw leafy vegetables, ½ cup of chopped or cooked vegetables, or 4 ounces (½ cup) of vegetable juice. Choose vegetables more often than vegetable juice. · Get 2 to 3 servings of low-fat and fat-free dairy each day. A serving is 8 ounces of milk, 1 cup of yogurt, or 1 ½ ounces of cheese. · Eat 6 to 8 servings of grains each day.  A serving is 1 slice of bread, 1 ounce of dry cereal, or ½ cup of cooked rice, pasta, or cooked cereal. Try to choose whole-grain products as much as possible. · Limit lean meat, poultry, and fish to 2 servings each day. A serving is 3 ounces, about the size of a deck of cards. · Eat 4 to 5 servings of nuts, seeds, and legumes (cooked dried beans, lentils, and split peas) each week. A serving is 1/3 cup of nuts, 2 tablespoons of seeds, or ½ cup of cooked beans or peas. · Limit fats and oils to 2 to 3 servings each day. A serving is 1 teaspoon of vegetable oil or 2 tablespoons of salad dressing. · Limit sweets and added sugars to 5 servings or less a week. A serving is 1 tablespoon jelly or jam, ½ cup sorbet, or 1 cup of lemonade. · Eat less than 2,300 milligrams (mg) of sodium a day. If you limit your sodium to 1,500 mg a day, you can lower your blood pressure even more. · Be aware that all of these are the suggested number of servings for people who eat 1,800 to 2,000 calories a day. Your recommended number of servings may be different if you need more or fewer calories. Tips for success · Start small. Do not try to make dramatic changes to your diet all at once. You might feel that you are missing out on your favorite foods and then be more likely to not follow the plan. Make small changes, and stick with them. Once those changes become habit, add a few more changes. · Try some of the following: ? Make it a goal to eat a fruit or vegetable at every meal and at snacks. This will make it easy to get the recommended amount of fruits and vegetables each day. ? Try yogurt topped with fruit and nuts for a snack or healthy dessert. ? Add lettuce, tomato, cucumber, and onion to sandwiches. ? Combine a ready-made pizza crust with low-fat mozzarella cheese and lots of vegetable toppings. Try using tomatoes, squash, spinach, broccoli, carrots, cauliflower, and onions. ?  Have a variety of cut-up vegetables with a low-fat dip as an appetizer instead of chips and dip. ? Sprinkle sunflower seeds or chopped almonds over salads. Or try adding chopped walnuts or almonds to cooked vegetables. ? Try some vegetarian meals using beans and peas. Add garbanzo or kidney beans to salads. Make burritos and tacos with mashed seymour beans or black beans. Where can you learn more? Go to http://kenna-lisa.info/ Enter Y030 in the search box to learn more about \"DASH Diet: Care Instructions. \" Current as of: August 31, 2020               Content Version: 12.8 © 9606-1755 [employer]. Care instructions adapted under license by [employer] (which disclaims liability or warranty for this information). If you have questions about a medical condition or this instruction, always ask your healthcare professional. [address] any warranty or liability for your use of this information.

## 2021-06-15 ENCOUNTER — TELEPHONE (OUTPATIENT)
Dept: INTERNAL MEDICINE CLINIC | Age: 81
End: 2021-06-15

## 2021-06-15 ENCOUNTER — OFFICE VISIT (OUTPATIENT)
Dept: INTERNAL MEDICINE CLINIC | Age: 81
End: 2021-06-15
Payer: MEDICARE

## 2021-06-15 VITALS
HEIGHT: 73 IN | RESPIRATION RATE: 16 BRPM | BODY MASS INDEX: 23.06 KG/M2 | TEMPERATURE: 98 F | SYSTOLIC BLOOD PRESSURE: 140 MMHG | HEART RATE: 66 BPM | DIASTOLIC BLOOD PRESSURE: 70 MMHG | WEIGHT: 174 LBS | OXYGEN SATURATION: 96 %

## 2021-06-15 DIAGNOSIS — J02.9 SORE THROAT: ICD-10-CM

## 2021-06-15 DIAGNOSIS — S90.122A CONTUSION OF FIFTH TOE, LEFT, INITIAL ENCOUNTER: Primary | ICD-10-CM

## 2021-06-15 DIAGNOSIS — R09.82 PND (POST-NASAL DRIP): ICD-10-CM

## 2021-06-15 PROCEDURE — G8432 DEP SCR NOT DOC, RNG: HCPCS | Performed by: NURSE PRACTITIONER

## 2021-06-15 PROCEDURE — 99214 OFFICE O/P EST MOD 30 MIN: CPT | Performed by: NURSE PRACTITIONER

## 2021-06-15 PROCEDURE — G8420 CALC BMI NORM PARAMETERS: HCPCS | Performed by: NURSE PRACTITIONER

## 2021-06-15 PROCEDURE — G8536 NO DOC ELDER MAL SCRN: HCPCS | Performed by: NURSE PRACTITIONER

## 2021-06-15 PROCEDURE — 1101F PT FALLS ASSESS-DOCD LE1/YR: CPT | Performed by: NURSE PRACTITIONER

## 2021-06-15 PROCEDURE — G8427 DOCREV CUR MEDS BY ELIG CLIN: HCPCS | Performed by: NURSE PRACTITIONER

## 2021-06-15 NOTE — PROGRESS NOTES
Chief Complaint   Patient presents with    Hoarse     acute care    Toe Pain       SUBJECTIVE:    Jaren Emanuel is a [de-identified] y.o. male who is here today with complaints of a sore throat, and left fifth toe pain. Patient states onset of increased postnasal drip and sore throat which occurred after doing yard work earlier in the day. He denies any sneezing, itchy watery eyes, or cough. He denies any fever or difficulty swallowing. He is unsure if the two are related. Additionally, he complains of left fifth toe pain after hitting it against a door frame accidentally. He has some bruising to the area, and denies any deformity. He states it is slightly tender to touch and while ambulating. Current Outpatient Medications   Medication Sig Dispense Refill    ergocalciferol (ERGOCALCIFEROL) 1,250 mcg (50,000 unit) capsule TAKE ONE CAPSULE BY MOUTH ONCE WEEKLY 12 Cap 2    Xarelto 20 mg tab tablet TAKE ONE TABLET BY MOUTH DAILY 90 Tab 2    pravastatin (PRAVACHOL) 40 mg tablet TAKE ONE TABLET BY MOUTH DAILY 90 Tab 2    cyanocobalamin (VITAMIN B-12) 500 mcg tablet Take 500 mcg by mouth daily.  calcium carbonate (TUMS) 200 mg calcium (500 mg) chew Take 2 tablets by mouth daily.  calcium-cholecalciferol, d3, (CALCIUM 600 + D) 600-125 mg-unit tab Take 1 tablet by mouth daily.  acetaminophen (TYLENOL EXTRA STRENGTH) 500 mg tablet Take 1,000 mg by mouth every six (6) hours as needed for Pain.  diphenhydrAMINE (BENADRYL) 25 mg capsule Take 25 mg by mouth every six (6) hours as needed for Itching or Sleep (allergies).  triamcinolone acetonide (KENALOG) 0.1 % topical cream Apply  to affected area two (2) times a day.  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    Factor V Leiden mutation ([facility] 10/3/2017    Gastroesophageal reflux disease without esophagitis 10/3/2017    Herpes zoster virus infection of face and ear nerves 10/3/2017    Hypercholesterolemia 10/3/2017    Long-term (current) use of anticoagulants 10/3/2017    Long-term use of high-risk medication 10/3/2017    Lumbar sprain 10/3/2017    Mid back pain 12/14/2018    Osteopenia 10/3/2017    Other ill-defined conditions(799.89)     High Cholesterol    Other ill-defined conditions(799.89)     doctor does not want him to have quinolones family -causes achilles tendonitis    Prostatic cancer ([address] 10/3/2017    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/2017    Recurrent deep vein thrombosis (DVT) ([address] 10/3/2017    Renal cell cancer ([address] 10/3/2017    Thromboembolus ([address]     DVT    Toe fracture, left 10/3/2017    Vitamin D deficiency 10/3/2017     Past Surgical History:   Procedure Laterality Date    COLORECTAL SCRN; HI RISK IND  2/3/2015         HX ORTHOPAEDIC      cortisone shot in knee and elbow    HX OTHER SURGICAL      colonoscopy    HX TONSILLECTOMY      HX UROLOGICAL Right     Kidney Removal     Allergies   Allergen Reactions    Quinolones Unknown (comments)       REVIEW OF SYSTEMS:                                        POSITIVE= bold text  Negative = regular text    General:                     fever, chills, sweats, generalized weakness, weight loss/gain,                                       loss of appetite   Eyes:                           blurred vision, eye pain, loss of vision, double vision  ENT:                            rhinorrhea, pharyngitis, PND  Respiratory:               cough, sputum production, SOB, SCHWARTZ, wheezing, pleuritic pain   Cardiology:                chest pain, palpitations, orthopnea, PND, edema, syncope   Gastrointestinal:       abdominal pain , N/V, diarrhea, dysphagia, constipation, bleeding   Genitourinary:           frequency, urgency, dysuria, hematuria, incontinence   Muskuloskeletal :      arthralgia, myalgia, back pain  Hematology:              easy bruising, nose or gum bleeding, lymphadenopathy   Dermatological:          Bruising, rash, ulceration, pruritis, color change / jaundice  Endocrine:                 hot flashes or polydipsia   Neurological:             headache, dizziness, confusion, focal weakness, paresthesia,                                      Speech difficulties, memory loss, gait difficulty  Psychological:          Feelings of anxiety, depression, agitation        Social History     Socioeconomic History    Marital status:      Spouse name: Not on file    Number of children: Not on file    Years of education: Not on file    Highest education level: Not on file   Tobacco Use    Smoking status: Never Smoker    Smokeless tobacco: Never Used   Vaping Use    Vaping Use: Never used   Substance and Sexual Activity    Alcohol use: No    Drug use: No     Social Determinants of Health     Financial Resource Strain:     Difficulty of Paying Living Expenses:    Food Insecurity:     Worried About Running Out of Food in the Last Year:     Ran Out of Food in the Last Year:    Transportation Needs:     Lack of Transportation (Medical):      Lack of Transportation (Non-Medical):    Physical Activity:     Days of Exercise per Week:     Minutes of Exercise per Session:    Stress:     Feeling of Stress :    Social Connections:     Frequency of Communication with Friends and Family:     Frequency of Social Gatherings with Friends and Family:     Attends [employer] Services:     Active Member of Clubs or Organizations:     Attends Club or Organization Meetings:     Marital Status:      Family History   Problem Relation Age of Onset    Hypertension Mother    Rachel Barber Other Mother         abdominal aneurysm    Cancer Father         bladder        OBJECTIVE:     Visit Vitals  BP (!) 140/70 (BP 1 Location: Left upper arm, BP Patient Position: Sitting, BP Cuff Size: Small adult)   Pulse 66   Temp 98 °F (36.7 °C)   Resp 16   Ht 6' 1\" (1.854 m)   Wt 174 lb (78.9 kg)   SpO2 96%   BMI 22.96 kg/m²       Constitutional: He appears well nourished, of stated age, and dressed appropriately. Eyes: Sclera anicteric, PERRLA, EOMI  Throat: Slightly irritated with cobblestone effect to oropharynx. Postnasal drip with clear mucus noted. Neck: Supple without lymphadenopathy. Thyroid normal, No JVD or bruits  Respiratory: Clear to ascultation X5, normal inspiratory effort, no adventitious breath sounds. Cardiovascular: Regular rate and rhythm. PMI not displaced, No thrills, no peripheral edema  Musculoskeletal: Mild tenderness to PIP of left fifth toe. No significant swelling. Sensory/neuro intact. Slight bruising to dorsal aspect. Range of motion intact. Toe was buddy-taped while in office. Integumentary: Bruising noted to left fifth toe. Peripheral Vascular: Normal pulses palpable to PT/DP. Neuro: Non-focal exam, A & O X 3.  Psychiatric: Appropriate affect and demeanor, pleasant and cooperative. ASSESSMENT/PLAN:     ICD-10-CM ICD-9-CM    1. Contusion of fifth toe, left, initial encounter  S90.122A 924.3 XR 5TH TOE LT MIN 2 V   2. Sore throat  J02.9 462    3. PND (post-nasal drip)  R09.82 784.91      1: Patient requests x-ray of left fifth toe. We will arrange as requested. 2: Buddy tape as discussed. Try to use hard-soled shoes. 3: Elevate and apply ice pack for comfort. May use Tylenol as needed. 4: Sore throat likely due to postnasal drip. I have suggested use of Zyrtec or Allegra. Patient may use Cepacol lozenges as well. Monitor temperature for fever. Notify if greater than 100.5 °F.  5: Patient return to clinic if not improving or if worsening.   Patient states understanding and agrees with plan. ATTENTION:   This medical record was transcribed using an electronic medical records system. Although proofread, it may and can contain electronic and spelling errors. Other human spelling and other errors may be present. Corrections may be executed at a later time. Please feel free to contact us for any clarifications as needed. Signed,  Christy Oneill.  SIMON Urrutia APRN FNP-BC

## 2021-06-15 NOTE — PROGRESS NOTES
Deandre Swift is a [de-identified] y.o. male    Chief Complaint   Patient presents with    Hoarse     acute care    Toe Pain       Visit Vitals  BP (!) 140/70 (BP 1 Location: Left upper arm, BP Patient Position: Sitting, BP Cuff Size: Small adult)   Pulse 66   Temp 98 °F (36.7 °C)   Resp 16   Ht 6' 1\" (1.854 m)   Wt 174 lb (78.9 kg)   SpO2 96%   BMI 22.96 kg/m²           1. Have you been to the ER, urgent care clinic since your last visit? Hospitalized since your last visit? No     2. Have you seen or consulted any other health care providers outside of the [address] [place] [address] since your last visit? Include any pap smears or colon screening.  No

## 2021-09-20 ENCOUNTER — TRANSCRIBE ORDER (OUTPATIENT)
Dept: REGISTRATION | Age: 81
End: 2021-09-20

## 2021-09-20 ENCOUNTER — HOSPITAL ENCOUNTER (OUTPATIENT)
Dept: GENERAL RADIOLOGY | Age: 81
Discharge: HOME | End: 2021-09-20
Payer: MEDICARE

## 2021-09-20 DIAGNOSIS — C64.9 ADENOCARCINOMA, RENAL CELL (HCC): Primary | ICD-10-CM

## 2021-09-20 DIAGNOSIS — C64.9 ADENOCARCINOMA, RENAL CELL (HCC): ICD-10-CM

## 2021-09-20 PROCEDURE — 71046 X-RAY EXAM CHEST 2 VIEWS: CPT

## 2021-10-22 ENCOUNTER — CLINICAL SUPPORT (OUTPATIENT)
Dept: INTERNAL MEDICINE CLINIC | Age: 81
End: 2021-10-22
Payer: MEDICARE

## 2021-10-22 VITALS
WEIGHT: 173.2 LBS | HEART RATE: 74 BPM | HEIGHT: 73 IN | TEMPERATURE: 97.7 F | DIASTOLIC BLOOD PRESSURE: 72 MMHG | RESPIRATION RATE: 14 BRPM | SYSTOLIC BLOOD PRESSURE: 120 MMHG | BODY MASS INDEX: 22.95 KG/M2 | OXYGEN SATURATION: 100 %

## 2021-10-22 DIAGNOSIS — Z23 NEEDS FLU SHOT: Primary | ICD-10-CM

## 2021-10-22 PROCEDURE — G0008 ADMIN INFLUENZA VIRUS VAC: HCPCS | Performed by: INTERNAL MEDICINE

## 2021-10-22 PROCEDURE — 90694 VACC AIIV4 NO PRSRV 0.5ML IM: CPT | Performed by: INTERNAL MEDICINE

## 2021-10-22 NOTE — PROGRESS NOTES
After obtaining written consent and per orders of Dr. Jaimes Nose, injection of Fluad given by Tomer Curtis [address] [place] [address] Vanessa. Order and injection/medication verified by Dr Maral Mcintosh. Patient tolerated procedure well. VIS was given to them. No reactions noted.

## 2021-10-22 NOTE — PATIENT INSTRUCTIONS
Vaccine Information Statement    Influenza (Flu) Vaccine (Inactivated or Recombinant): What You Need to Know    Many vaccine information statements are available in Frisian and other languages. See www.immunize.org/vis. Hojas de información sobre vacunas están disponibles en español y en muchos otros idiomas. Visite www.immunize.org/vis. 1. Why get vaccinated? Influenza vaccine can prevent influenza (flu). Flu is a contagious disease that spreads around the [facility] every year, usually between October and May. Anyone can get the flu, but it is more dangerous for some people. Infants and young children, people 72 years and older, pregnant people, and people with certain health conditions or a weakened immune system are at greatest risk of flu complications. Pneumonia, bronchitis, sinus infections, and ear infections are examples of flu-related complications. If you have a medical condition, such as heart disease, cancer, or diabetes, flu can make it worse. Flu can cause fever and chills, sore throat, muscle aches, fatigue, cough, headache, and runny or stuffy nose. Some people may have vomiting and diarrhea, though this is more common in children than adults. In an average year, thousands of people in the [facility] die from flu, and many more are hospitalized. Flu vaccine prevents millions of illnesses and flu-related visits to the doctor each year. 2. Influenza vaccines     CDC recommends everyone 6 months and older get vaccinated every flu season. Children 6 months through 6years of age may need 2 doses during a single flu season. Everyone else needs only 1 dose each flu season. It takes about 2 weeks for protection to develop after vaccination. There are many flu viruses, and they are always changing. Each year a new flu vaccine is made to protect against the influenza viruses believed to be likely to cause disease in the upcoming flu season.  Even when the vaccine doesnt exactly match these viruses, it may still provide some protection. Influenza vaccine does not cause flu. Influenza vaccine may be given at the same time as other vaccines. 3. Talk with your health care provider    Tell your vaccination provider if the person getting the vaccine:   Has had an allergic reaction after a previous dose of influenza vaccine, or has any severe, life-threatening allergies    Has ever had Guillain-Barré Syndrome (also called GBS)    In some cases, your health care provider may decide to postpone influenza vaccination until a future visit. Influenza vaccine can be administered at any time during pregnancy. People who are or will be pregnant during influenza season should receive inactivated influenza vaccine. People with minor illnesses, such as a cold, may be vaccinated. People who are moderately or severely ill should usually wait until they recover before getting influenza vaccine. Your health care provider can give you more information. 4. Risks of a vaccine reaction     Soreness, redness, and swelling where the shot is given, fever, muscle aches, and headache can happen after influenza vaccination.  There may be a very small increased risk of Guillain-Barré Syndrome (GBS) after inactivated influenza vaccine (the flu shot). Gisel Lambert children who get the flu shot along with pneumococcal vaccine (PCV13) and/or DTaP vaccine at the same time might be slightly more likely to have a seizure caused by fever. Tell your health care provider if a child who is getting flu vaccine has ever had a seizure. People sometimes faint after medical procedures, including vaccination. Tell your provider if you feel dizzy or have vision changes or ringing in the ears. As with any medicine, there is a very remote chance of a vaccine causing a severe allergic reaction, other serious injury, or death. 5. What if there is a serious problem?     An allergic reaction could occur after the vaccinated person leaves the clinic. If you see signs of a severe allergic reaction (hives, swelling of the face and throat, difficulty breathing, a fast heartbeat, dizziness, or weakness), call 9-1-1 and get the person to the nearest hospital.    For other signs that concern you, call your health care provider. Adverse reactions should be reported to the Vaccine Adverse Event Reporting System (VAERS). Your health care provider will usually file this report, or you can do it yourself. Visit the VAERS website at www.vaers. [facility].gov or call 1-373.146.2114. VAERS is only for reporting reactions, and VAERS staff members do not give medical advice. 6. The National Vaccine Injury Compensation Program    The [facility] Vaccine Injury Compensation Program (VICP) is a federal program that was created to compensate people who may have been injured by certain vaccines. Claims regarding alleged injury or death due to vaccination have a time limit for filing, which may be as short as two years. Visit the VICP website at www.[facility].gov/vaccinecompensation or call 8-806.592.3152 to learn about the program and about filing a claim. 7. How can I learn more?  Ask your health care provider.  Call your local or state health department.  Visit the website of the Food and Drug Administration (FDA) for vaccine package inserts and additional information at www.fda.gov/vaccines-blood-biologics/vaccines.  Contact the Centers for Disease Control and Prevention (CDC):  - Call 9-804.864.3402 (1-800-CDC-INFO) or  - Visit CDCs influenza website at www.cdc.gov/flu. Vaccine Information Statement   Inactivated Influenza Vaccine   8/6/2021  42 . [facility] Even [id number]   Department of Health and Human Services  Centers for Disease Control and Prevention    Office Use Only

## 2021-11-08 ENCOUNTER — OFFICE VISIT (OUTPATIENT)
Dept: INTERNAL MEDICINE CLINIC | Age: 81
End: 2021-11-08
Payer: MEDICARE

## 2021-11-08 VITALS
OXYGEN SATURATION: 97 % | BODY MASS INDEX: 22.64 KG/M2 | WEIGHT: 170.8 LBS | DIASTOLIC BLOOD PRESSURE: 78 MMHG | RESPIRATION RATE: 14 BRPM | HEIGHT: 73 IN | HEART RATE: 57 BPM | SYSTOLIC BLOOD PRESSURE: 118 MMHG

## 2021-11-08 DIAGNOSIS — D68.51 FACTOR V LEIDEN MUTATION (HCC): Chronic | ICD-10-CM

## 2021-11-08 DIAGNOSIS — E55.9 VITAMIN D DEFICIENCY: Chronic | ICD-10-CM

## 2021-11-08 DIAGNOSIS — Z79.01 LONG TERM CURRENT USE OF ANTICOAGULANT THERAPY: Chronic | ICD-10-CM

## 2021-11-08 DIAGNOSIS — E78.00 HYPERCHOLESTEROLEMIA: Primary | Chronic | ICD-10-CM

## 2021-11-08 DIAGNOSIS — M85.80 OSTEOPENIA, UNSPECIFIED LOCATION: ICD-10-CM

## 2021-11-08 DIAGNOSIS — Z79.899 LONG-TERM USE OF HIGH-RISK MEDICATION: Chronic | ICD-10-CM

## 2021-11-08 LAB
25(OH)D3 SERPL-MCNC: 78.7 NG/ML (ref 30–100)
ALBUMIN SERPL-MCNC: 4 G/DL (ref 3.5–5)
ALBUMIN/GLOB SERPL: 1.3 {RATIO} (ref 1.1–2.2)
ALP SERPL-CCNC: 58 U/L (ref 45–117)
ALT SERPL-CCNC: 28 U/L (ref 12–78)
ANION GAP SERPL CALC-SCNC: 3 MMOL/L (ref 5–15)
AST SERPL-CCNC: 17 U/L (ref 15–37)
BASOPHILS # BLD: 0.1 K/UL (ref 0–0.1)
BASOPHILS NFR BLD: 1 % (ref 0–1)
BILIRUB SERPL-MCNC: 0.7 MG/DL (ref 0.2–1)
BUN SERPL-MCNC: 25 MG/DL (ref 6–20)
BUN/CREAT SERPL: 23 (ref 12–20)
CALCIUM SERPL-MCNC: 9.9 MG/DL (ref 8.5–10.1)
CHLORIDE SERPL-SCNC: 106 MMOL/L (ref 97–108)
CHOLEST SERPL-MCNC: 174 MG/DL
CK SERPL-CCNC: 123 U/L (ref 39–308)
CO2 SERPL-SCNC: 32 MMOL/L (ref 21–32)
CREAT SERPL-MCNC: 1.11 MG/DL (ref 0.7–1.3)
DIFFERENTIAL METHOD BLD: ABNORMAL
EOSINOPHIL # BLD: 0.1 K/UL (ref 0–0.4)
EOSINOPHIL NFR BLD: 2 % (ref 0–7)
ERYTHROCYTE [DISTWIDTH] IN BLOOD BY AUTOMATED COUNT: 15.2 % (ref 11.5–14.5)
GLOBULIN SER CALC-MCNC: 3 G/DL (ref 2–4)
GLUCOSE SERPL-MCNC: 100 MG/DL (ref 65–100)
HCT VFR BLD AUTO: 47.7 % (ref 36.6–50.3)
HDLC SERPL-MCNC: 78 MG/DL
HDLC SERPL: 2.2 {RATIO} (ref 0–5)
HGB BLD-MCNC: 15 G/DL (ref 12.1–17)
IMM GRANULOCYTES # BLD AUTO: 0 K/UL (ref 0–0.04)
IMM GRANULOCYTES NFR BLD AUTO: 1 % (ref 0–0.5)
LDLC SERPL CALC-MCNC: 72.6 MG/DL (ref 0–100)
LYMPHOCYTES # BLD: 1.2 K/UL (ref 0.8–3.5)
LYMPHOCYTES NFR BLD: 18 % (ref 12–49)
MCH RBC QN AUTO: 29.5 PG (ref 26–34)
MCHC RBC AUTO-ENTMCNC: 31.4 G/DL (ref 30–36.5)
MCV RBC AUTO: 93.7 FL (ref 80–99)
MONOCYTES # BLD: 0.6 K/UL (ref 0–1)
MONOCYTES NFR BLD: 9 % (ref 5–13)
NEUTS SEG # BLD: 5 K/UL (ref 1.8–8)
NEUTS SEG NFR BLD: 69 % (ref 32–75)
NRBC # BLD: 0 K/UL (ref 0–0.01)
NRBC BLD-RTO: 0 PER 100 WBC
PLATELET # BLD AUTO: 150 K/UL (ref 150–400)
PMV BLD AUTO: 10.9 FL (ref 8.9–12.9)
POTASSIUM SERPL-SCNC: 4.6 MMOL/L (ref 3.5–5.1)
PROT SERPL-MCNC: 7 G/DL (ref 6.4–8.2)
RBC # BLD AUTO: 5.09 M/UL (ref 4.1–5.7)
SODIUM SERPL-SCNC: 141 MMOL/L (ref 136–145)
TRIGL SERPL-MCNC: 117 MG/DL (ref ?–150)
TSH SERPL DL<=0.05 MIU/L-ACNC: 1.78 UIU/ML (ref 0.36–3.74)
VLDLC SERPL CALC-MCNC: 23.4 MG/DL
WBC # BLD AUTO: 7 K/UL (ref 4.1–11.1)

## 2021-11-08 PROCEDURE — G8427 DOCREV CUR MEDS BY ELIG CLIN: HCPCS | Performed by: INTERNAL MEDICINE

## 2021-11-08 PROCEDURE — G8432 DEP SCR NOT DOC, RNG: HCPCS | Performed by: INTERNAL MEDICINE

## 2021-11-08 PROCEDURE — G8536 NO DOC ELDER MAL SCRN: HCPCS | Performed by: INTERNAL MEDICINE

## 2021-11-08 PROCEDURE — 1101F PT FALLS ASSESS-DOCD LE1/YR: CPT | Performed by: INTERNAL MEDICINE

## 2021-11-08 PROCEDURE — 99214 OFFICE O/P EST MOD 30 MIN: CPT | Performed by: INTERNAL MEDICINE

## 2021-11-08 PROCEDURE — G8420 CALC BMI NORM PARAMETERS: HCPCS | Performed by: INTERNAL MEDICINE

## 2021-11-08 NOTE — PATIENT INSTRUCTIONS
DASH Diet: Care Instructions  Your Care Instructions     The DASH diet is an eating plan that can help lower your blood pressure. DASH stands for Dietary Approaches to Stop Hypertension. Hypertension is high blood pressure. The DASH diet focuses on eating foods that are high in calcium, potassium, and magnesium. These nutrients can lower blood pressure. The foods that are highest in these nutrients are fruits, vegetables, low-fat dairy products, nuts, seeds, and legumes. But taking calcium, potassium, and magnesium supplements instead of eating foods that are high in those nutrients does not have the same effect. The DASH diet also includes whole grains, fish, and poultry. The DASH diet is one of several lifestyle changes your doctor may recommend to lower your high blood pressure. Your doctor may also want you to decrease the amount of sodium in your diet. Lowering sodium while following the DASH diet can lower blood pressure even further than just the DASH diet alone. Follow-up care is a key part of your treatment and safety. Be sure to make and go to all appointments, and call your doctor if you are having problems. It's also a good idea to know your test results and keep a list of the medicines you take. How can you care for yourself at home? Following the DASH diet  · Eat 4 to 5 servings of fruit each day. A serving is 1 medium-sized piece of fruit, ½ cup chopped or canned fruit, 1/4 cup dried fruit, or 4 ounces (½ cup) of fruit juice. Choose fruit more often than fruit juice. · Eat 4 to 5 servings of vegetables each day. A serving is 1 cup of lettuce or raw leafy vegetables, ½ cup of chopped or cooked vegetables, or 4 ounces (½ cup) of vegetable juice. Choose vegetables more often than vegetable juice. · Get 2 to 3 servings of low-fat and fat-free dairy each day. A serving is 8 ounces of milk, 1 cup of yogurt, or 1 ½ ounces of cheese. · Eat 6 to 8 servings of grains each day.  A serving is 1 slice of bread, 1 ounce of dry cereal, or ½ cup of cooked rice, pasta, or cooked cereal. Try to choose whole-grain products as much as possible. · Limit lean meat, poultry, and fish to 2 servings each day. A serving is 3 ounces, about the size of a deck of cards. · Eat 4 to 5 servings of nuts, seeds, and legumes (cooked dried beans, lentils, and split peas) each week. A serving is 1/3 cup of nuts, 2 tablespoons of seeds, or ½ cup of cooked beans or peas. · Limit fats and oils to 2 to 3 servings each day. A serving is 1 teaspoon of vegetable oil or 2 tablespoons of salad dressing. · Limit sweets and added sugars to 5 servings or less a week. A serving is 1 tablespoon jelly or jam, ½ cup sorbet, or 1 cup of lemonade. · Eat less than 2,300 milligrams (mg) of sodium a day. If you limit your sodium to 1,500 mg a day, you can lower your blood pressure even more. · Be aware that all of these are the suggested number of servings for people who eat 1,800 to 2,000 calories a day. Your recommended number of servings may be different if you need more or fewer calories. Tips for success  · Start small. Do not try to make dramatic changes to your diet all at once. You might feel that you are missing out on your favorite foods and then be more likely to not follow the plan. Make small changes, and stick with them. Once those changes become habit, add a few more changes. · Try some of the following:  ? Make it a goal to eat a fruit or vegetable at every meal and at snacks. This will make it easy to get the recommended amount of fruits and vegetables each day. ? Try yogurt topped with fruit and nuts for a snack or healthy dessert. ? Add lettuce, tomato, cucumber, and onion to sandwiches. ? Combine a ready-made pizza crust with low-fat mozzarella cheese and lots of vegetable toppings. Try using tomatoes, squash, spinach, broccoli, carrots, cauliflower, and onions. ?  Have a variety of cut-up vegetables with a low-fat dip as an appetizer instead of chips and dip. ? Sprinkle sunflower seeds or chopped almonds over salads. Or try adding chopped walnuts or almonds to cooked vegetables. ? Try some vegetarian meals using beans and peas. Add garbanzo or kidney beans to salads. Make burritos and tacos with mashed seymour beans or black beans. Where can you learn more? Go to http://www.tyler.com/  Enter H967 in the search box to learn more about \"DASH Diet: Care Instructions. \"  Current as of: April 29, 2021               Content Version: 13.0  © 8679-6729 [employer]. Care instructions adapted under license by [employer] (which disclaims liability or warranty for this information). If you have questions about a medical condition or this instruction, always ask your healthcare professional. [address] any warranty or liability for your use of this information.

## 2021-11-08 NOTE — PROGRESS NOTES
IV removed.  Catheter intact and site benign. Pressure and 4x4 gauze applied to 
site. No bleeding noted. Shikha Abraham is a 80 y.o. male presenting for Follow Up Chronic Condition (6 mo fu)  . 1. Have you been to the ER, urgent care clinic since your last visit? Hospitalized since your last visit? No    2. Have you seen or consulted any other health care providers outside of the [address] [place] [address] since your last visit? Include any pap smears or colon screening. Ortho, Urologist    Fall Risk Assessment, last 12 mths 6/15/2021   Able to walk? Yes   Fall in past 12 months? 0   Do you feel unsteady? 0   Are you worried about falling 0         Abuse Screening Questionnaire 5/4/2021   Do you ever feel afraid of your partner? N   Are you in a relationship with someone who physically or mentally threatens you? N   Is it safe for you to go home? Y       3 most recent PHQ Screens 6/15/2021   Little interest or pleasure in doing things Not at all   Feeling down, depressed, irritable, or hopeless Not at all   Total Score PHQ 2 0       There are no discontinued medications.

## 2021-11-08 NOTE — PROGRESS NOTES
Subjective:     Mr. Cuong Hernández returns to the office today in follow-up of multiple medical problems. The patient has hypercholesterolemia currently on statin therapy. Tolerates this without muscle soreness or GI upset. Has no history of vascular disease and denies exertional chest pains or claudication. He has factor V Leiden deficiency. 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 He is now chronically on Xarelto for clot prevention. He is tolerating this without any type of bleeding and has had no recurrent symptoms. The patient has osteopenia associated with vitamin D deficiency. He is supplementing this with calcium, vitamin D D and a separate vitamin D supplement. The patient is tolerating his regimen without GI upset. He has had no falls or fractures.     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    Factor V Leiden mutation ([address] 10/3/2017    Gastroesophageal reflux disease without esophagitis 10/3/2017    Herpes zoster virus infection of face and ear nerves 10/3/2017    Hypercholesterolemia 10/3/2017    Long-term (current) use of anticoagulants 10/3/2017    Long-term use of high-risk medication 10/3/2017    Lumbar sprain 10/3/2017    Mid back pain 12/14/2018    Osteopenia 10/3/2017    Other ill-defined conditions(799.89)     High Cholesterol    Other ill-defined conditions(799.89)     doctor does not want him to have quinolones family -causes achilles tendonitis    Prostatic cancer ([address] 10/3/2017    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/2017    Recurrent deep vein thrombosis (DVT) ([address] 10/3/2017    Renal cell cancer ([address] 10/3/2017    Thromboembolus ([address] DVT    Toe fracture, left 10/3/2017    Vitamin D deficiency 10/3/2017     Past Surgical History:   Procedure Laterality Date    COLORECTAL SCRN; HI RISK IND  2/3/2015         HX ORTHOPAEDIC      cortisone shot in knee and elbow    HX OTHER SURGICAL      colonoscopy    HX TONSILLECTOMY      HX UROLOGICAL Right     Kidney Removal     Allergies   Allergen Reactions    Quinolones Unknown (comments)     Current Outpatient Medications   Medication Sig Dispense Refill    Xarelto 20 mg tab tablet TAKE ONE TABLET BY MOUTH DAILY 90 Tablet 1    pravastatin (PRAVACHOL) 40 mg tablet TAKE ONE TABLET BY MOUTH DAILY 90 Tablet 1    ergocalciferol (ERGOCALCIFEROL) 1,250 mcg (50,000 unit) capsule TAKE ONE CAPSULE BY MOUTH ONCE WEEKLY 12 Cap 2    triamcinolone acetonide (KENALOG) 0.1 % topical cream Apply  to affected area two (2) times a day. use thin layer 15 g 0    cyanocobalamin (VITAMIN B-12) 500 mcg tablet Take 500 mcg by mouth daily.  calcium carbonate (TUMS) 200 mg calcium (500 mg) chew Take 2 tablets by mouth daily.  calcium-cholecalciferol, d3, (CALCIUM 600 + D) 600-125 mg-unit tab Take 1 tablet by mouth daily.  acetaminophen (TYLENOL EXTRA STRENGTH) 500 mg tablet Take 1,000 mg by mouth every six (6) hours as needed for Pain.  diphenhydrAMINE (BENADRYL) 25 mg capsule Take 25 mg by mouth every six (6) hours as needed for Itching or Sleep (allergies).        Social History     Socioeconomic History    Marital status:    Tobacco Use    Smoking status: Never Smoker    Smokeless tobacco: Never Used   Vaping Use    Vaping Use: Never used   Substance and Sexual Activity    Alcohol use: No    Drug use: No     Family History   Problem Relation Age of Onset    Hypertension Mother     Other Mother         abdominal aneurysm    Cancer Father         bladder        Review of Systems:  GEN: no weight loss, weight gain, fatigue or night sweats  CV: no PND, orthopnea, or palpitations  Resp: no dyspnea on exertion, no cough  Abd: no nausea, vomiting or diarrhea  EXT: denies edema, claudication  Endocrine: no hair loss, excessive thirst or polyuria  Neurological ROS: no TIA or stroke symptoms  ROS otherwise negative      Objective:     Visit Vitals  /78 (BP 1 Location: Left upper arm, BP Patient Position: Sitting, BP Cuff Size: Adult)   Pulse (!) 57   Resp 14   Ht 6' 1\" (1.854 m)   Wt 170 lb 12.8 oz (77.5 kg)   SpO2 97%   BMI 22.53 kg/m²     Body mass index is 22.53 kg/m². General:   alert, cooperative and no distress   Eyes: conjunctivae/sclerae clear. PERRL, EOM's intact   Mouth:  No oral lesions, no pharyngeal erythema, no exudates   Neck: Trachea midline, no thyromegaly, no bruits   Heart: S1 and S2 normal,no murmurs noted    Lungs: Clear to auscultation bilaterally, no increased work of breathing   Abdomen: Soft, nontender. Normal bowel sounds   Extremities: No edema or cyanosis   Neuro: ..alert, oriented x3,speech normal in context and clarity, cranial nerves II-XII intact,motor strength: full proximally and distally,gait: normal  reflexes: full and symmetric     Physical exam otherwise negative         Assessment/Plan:     Diagnoses and all orders for this visit:    Hypercholesterolemia  -     COLLECTION VENOUS BLOOD,VENIPUNCTURE  -     CBC WITH AUTOMATED DIFF; Future  -     LIPID PANEL; Future  -     METABOLIC PANEL, COMPREHENSIVE; Future  -     TSH 3RD GENERATION; Future    Long-term use of high-risk medication  -     CK; Future    Vitamin D deficiency  -     VITAMIN D, 25 HYDROXY; Future    Factor V Leiden mutation ([facility]    Long term current use of anticoagulant therapy    Osteopenia, unspecified location        Other instructions: The patient's medications were reviewed and reconciled. No change in his current medical regimen will be made.     A no added salt, prudent diet is encouraged    Health maintenance issues were reviewed and are up-to-date    Await results of multiple labs    Follow-up in 6 months    Follow-up and Dispositions    · Return in about 6 months (around 5/8/2022). Dez Rebolledo MD    Please note that this dictation was completed with [employer], the computer voice recognition software. Quite often unanticipated grammatical, syntax, homophones, and other interpretive errors are inadvertently transcribed by the computer software. Please disregard these errors. Please excuse any errors that have escaped final proofreading.

## 2021-12-22 ENCOUNTER — OFFICE VISIT (OUTPATIENT)
Dept: INTERNAL MEDICINE CLINIC | Age: 81
End: 2021-12-22
Payer: MEDICARE

## 2021-12-22 VITALS
WEIGHT: 176.2 LBS | HEIGHT: 73 IN | OXYGEN SATURATION: 97 % | RESPIRATION RATE: 14 BRPM | BODY MASS INDEX: 23.35 KG/M2 | DIASTOLIC BLOOD PRESSURE: 74 MMHG | SYSTOLIC BLOOD PRESSURE: 116 MMHG | HEART RATE: 64 BPM | TEMPERATURE: 97.6 F

## 2021-12-22 DIAGNOSIS — Z79.01 LONG TERM CURRENT USE OF ANTICOAGULANT THERAPY: Chronic | ICD-10-CM

## 2021-12-22 DIAGNOSIS — E78.00 HYPERCHOLESTEROLEMIA: Chronic | ICD-10-CM

## 2021-12-22 DIAGNOSIS — D68.51 FACTOR V LEIDEN MUTATION (HCC): Chronic | ICD-10-CM

## 2021-12-22 DIAGNOSIS — Z01.818 PREOPERATIVE CLEARANCE: Primary | ICD-10-CM

## 2021-12-22 DIAGNOSIS — I82.409 RECURRENT DEEP VEIN THROMBOSIS (DVT) (HCC): ICD-10-CM

## 2021-12-22 DIAGNOSIS — H25.13 CATARACT, NUCLEAR SCLEROTIC SENILE, BILATERAL: ICD-10-CM

## 2021-12-22 PROCEDURE — G8536 NO DOC ELDER MAL SCRN: HCPCS | Performed by: INTERNAL MEDICINE

## 2021-12-22 PROCEDURE — G8420 CALC BMI NORM PARAMETERS: HCPCS | Performed by: INTERNAL MEDICINE

## 2021-12-22 PROCEDURE — 1101F PT FALLS ASSESS-DOCD LE1/YR: CPT | Performed by: INTERNAL MEDICINE

## 2021-12-22 PROCEDURE — G8432 DEP SCR NOT DOC, RNG: HCPCS | Performed by: INTERNAL MEDICINE

## 2021-12-22 PROCEDURE — 99214 OFFICE O/P EST MOD 30 MIN: CPT | Performed by: INTERNAL MEDICINE

## 2021-12-22 PROCEDURE — G8427 DOCREV CUR MEDS BY ELIG CLIN: HCPCS | Performed by: INTERNAL MEDICINE

## 2021-12-22 RX ORDER — CELECOXIB 100 MG/1
100 CAPSULE ORAL 2 TIMES DAILY
COMMUNITY
Start: 2021-10-13 | End: 2022-05-11

## 2021-12-22 NOTE — PROGRESS NOTES
Diallo Alonzo is a 80 y.o. male presenting for Pre-op Exam  .     1. Have you been to the ER, urgent care clinic since your last visit? Hospitalized since your last visit? No    2. Have you seen or consulted any other health care providers outside of the [address] [place] [address] since your last visit? Include any pap smears or colon screening. No    Fall Risk Assessment, last 12 mths 6/15/2021   Able to walk? Yes   Fall in past 12 months? 0   Do you feel unsteady? 0   Are you worried about falling 0         Abuse Screening Questionnaire 5/4/2021   Do you ever feel afraid of your partner? N   Are you in a relationship with someone who physically or mentally threatens you? N   Is it safe for you to go home? Y       3 most recent PHQ Screens 6/15/2021   Little interest or pleasure in doing things Not at all   Feeling down, depressed, irritable, or hopeless Not at all   Total Score PHQ 2 0       There are no discontinued medications.

## 2021-12-22 NOTE — PATIENT INSTRUCTIONS
Cataracts: Care Instructions  Overview     A cataract is a clouding of the lens of the eye. The lens focuses light on the retina at the back of the eye. Cataracts block some of the light and make it harder for you to see clearly. Cataracts often develop when you get older. Most cataracts grow slowly. At first, you may just need stronger glasses to help you see better. Later, if the cataracts grow and begin to seriously impair your vision, you can have surgery to remove them. Follow-up care is a key part of your treatment and safety. Be sure to make and go to all appointments, and call your doctor if you are having problems. It's also a good idea to know your test results and keep a list of the medicines you take. How can you care for yourself at home? · Move room lights and use window shades to avoid glare. · Use more lighting or higher-watt bulbs. · Use a magnifying glass for reading. Look for large-print books and other reading material to make reading more enjoyable. · Have your eyes checked regularly, and update your glasses when needed. · Wear sunglasses to block out harmful sunlight. Buy sunglasses that screen out ultraviolet A and B (UVA and UVB) rays. · Do not smoke. Smoking can make cataracts worse. If you need help quitting, talk to your doctor about stop-smoking programs and medicines. These can increase your chances of quitting for good. When should you call for help? Watch closely for changes in your health, and be sure to contact your doctor if:    · Your vision is getting worse.     · You have increasing trouble doing everyday tasks, like driving or reading the newspaper, because of your eyesight. Where can you learn more? Go to http://www.gray.com/  Enter P916 in the search box to learn more about \"Cataracts: Care Instructions. \"  Current as of: April 29, 2021               Content Version: 13.0  © 4787-7709 [employer].    Care instructions adapted under license by [employer] (which disclaims liability or warranty for this information). If you have questions about a medical condition or this instruction, always ask your healthcare professional. [address] any warranty or liability for your use of this information.

## 2021-12-22 NOTE — PROGRESS NOTES
History:   Mr. Leatha Araya is a 31-year-old  male referred to our office today by Dr. Gurjit Gaxiola in medical consultation for preoperative medical clearance prior to having staged bilateral cataract performed with the right eye to be done on January 4th and the left eye to be done on January 18th at [facility]. The patient gives a history of gradual loss of visual acuity over a 3 or 4-year period of time. Patient also notes problems with light glare with oncoming lights while driving at night. The patient gives no history of glaucoma, macular degeneration or retinal problems. He has been advised of his cataracts repaired which he plans to do on the above dates. The patient denies any eye pain or drainage. His past medical history is pertinent for hypercholesterolemia currently on statin therapy. The patient has no history of vascular disease and denies excess pains or claudication. The patient also has a history of recurrent DVT with a subsequent finding of factor V Leiden mutation for which she is on chronic anticoagulation currently on Xarelto. The patient has done well with this medication and has never had any bleeding problems or recurrent clots. Allergies include quinolones. He denies latex allergy or Band-Aid adhesive sensitivity. He has never had problems with anesthesia.     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    Factor V Leiden mutation ([facility] 10/3/2017    Gastroesophageal reflux disease without esophagitis 10/3/2017    Herpes zoster virus infection of face and ear nerves 10/3/2017    Hypercholesterolemia 10/3/2017    Long-term (current) use of anticoagulants 10/3/2017    Long-term use of high-risk medication 10/3/2017    Lumbar sprain 10/3/2017    Mid back pain 12/14/2018    Osteopenia 10/3/2017    Other ill-defined conditions(799.89)     High Cholesterol    Other ill-defined conditions(799.89)     doctor does not want him to have quinolones family -causes achilles tendonitis    Prostatic cancer [facility] 10/3/2017    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/2017    Recurrent deep vein thrombosis (DVT) ([facility] 10/3/2017    Renal cell cancer ([facility] 10/3/2017    Throm[facility]embolus ([facility]     DVT    Toe fracture, left 10/3/2017    Vitamin D deficiency 10/3/2017     Past Surgical History:   Procedure Laterality Date    COLORECTAL SCRN; HI RISK IND  2/3/2015         HX ORTHOPAEDIC      cortisone shot in knee and elbow    HX OTHER SURGICAL      colonoscopy    HX TONSILLECTOMY      HX UROLOGICAL Right     Kidney Removal     Allergies   Allergen Reactions    Quinolones Unknown (comments)     Current Outpatient Medications   Medication Sig Dispense Refill    celecoxib (CELEBREX) 100 mg capsule Take 100 mg by mouth two (2) times a day.  Xarelto 20 mg tab tablet TAKE ONE TABLET BY MOUTH DAILY 90 Tablet 1    pravastatin (PRAVACHOL) 40 mg tablet TAKE ONE TABLET BY MOUTH DAILY 90 Tablet 1    ergocalciferol (ERGOCALCIFEROL) 1,250 mcg (50,000 unit) capsule TAKE ONE CAPSULE BY MOUTH ONCE WEEKLY 12 Cap 2    triamcinolone acetonide (KENALOG) 0.1 % topical cream Apply  to affected area two (2) times a day. use thin layer 15 g 0    cyanocobalamin (VITAMIN B-12) 500 mcg tablet Take 500 mcg by mouth daily.  calcium carbonate (TUMS) 200 mg calcium (500 mg) chew Take 2 tablets by mouth daily.       calcium-cholecalciferol, d3, (CALCIUM 600 + D) 600-125 mg-unit tab Take 1 tablet by mouth daily.  acetaminophen (TYLENOL EXTRA STRENGTH) 500 mg tablet Take 1,000 mg by mouth every six (6) hours as needed for Pain.  diphenhydrAMINE (BENADRYL) 25 mg capsule Take 25 mg by mouth every six (6) hours as needed for Itching or Sleep (allergies). Social History     Socioeconomic History    Marital status:    Tobacco Use    Smoking status: Never Smoker    Smokeless tobacco: Never Used   Vaping Use    Vaping Use: Never used   Substance and Sexual Activity    Alcohol use: No    Drug use: No     Family History   Problem Relation Age of Onset    Hypertension Mother     Other Mother         abdominal aneurysm    Cancer Father         bladder        Reviewed PmHx, RxHx, FmHx, SocHx, AllgHx and updated and dated in the chart.     Review of Systems  Constitutional: negative for fevers, chills, anorexia and weight loss  Eyes:   Positive for decreased visual acuity and increased nighttime light glare  ENT:   negative for tinnitus,sore throat,nasal congestion,ear pain,hoarseness  Respiratory:  negative for cough, hemoptysis, dyspnea,wheezing  CV:   negative for chest pain, palpitations, lower extremity edema  GI:   negative for nausea, vomiting, diarrhea, abdominal pain,melena  Endo:               negative for polyuria,polydipsia,polyphagia,heat intolerance  Genitourinary: negative for frequency, dysuria and hematuria  Integumentary: negative for rash and pruritus  Hematologic:  negative for easy bruising and gum/nose bleeding  Musculoskel: negative for myalgias, arthralgias, back pain, muscle weakness, joint pain  Neurological:  negative for headaches, dizziness, vertigo, memory problems and gait   Behavl/Psych: negative for feelings of anxiety, depression, mood changes      Objective:     Vitals:    12/22/21 1456   BP: 116/74   Pulse: 64   Resp: 14   Temp: 97.6 °F (36.4 °C)   TempSrc: Oral   SpO2: 97%   Weight: 176 lb 3.2 oz (79.9 kg)   Height: 6' 1\" (1.854 m)     Body mass index is 23.25 kg/m². Physical Examination: General appearance - alert, well appearing, and in no distress and oriented to person, place, and time  Mental status - alert, oriented to person, place, and time, normal mood, behavior, speech, dress, motor activity, and thought processes  Eyes -deferred to ophthalmology  Ears - bilateral TM's and external ear canals normal, right ear normal, left ear normal  Mouth - mucous membranes moist, pharynx normal without lesions and tongue normal  Neck - supple, no significant adenopathy  Lymphatics - no palpable lymphadenopathy  Chest - clear to auscultation, no wheezes, rales or rhonchi,   Heart - normal rate, regular rhythm, normal S1, S2, no murmurs, rubs, clicks or gallops  Abdomen - soft, nontender, nondistended, no masses or organomegaly  Back exam - full range of motion, no tenderness, palpable spasm or pain on motion  Neurological - alert, oriented, normal speech, no focal findings or movement disorder noted, neck supple without rigidity, cranial nerves II through XII intact, DTR's normal and symmetric, motor and sensory grossly normal bilaterally  Musculoskeletal - no joint tenderness, deformity or swelling  Extremities - peripheral pulses normal, no pedal edema, no clubbing or cyanosis  Skin - normal coloration and turgor, no rashes, no suspicious skin lesions noted  Physical exam otherwise negative    Assessment/ Plan:   Diagnoses and all orders for this visit:    Preoperative clearance    Cataract, nuclear sclerotic senile, bilateral    Hypercholesterolemia    Factor V Leiden mutation ([facility]    Long term current use of anticoagulant therapy    Recurrent deep vein thrombosis (DVT) ([address]        Other instructions: The patient's medications were reviewed and reconciled. No change in his current medical regimen is suggested. Patient will continue his Xarelto through his surgery.     The patient is medically stable, at low cardiac risk, and cleared for the surgeries as scheduled. Follow-up with us as previously appointed    Follow-up and Dispositions    · Return for As previously scheduled. I have discussed the diagnosis with the patient and the intended plan as seen in the above orders. The patient has received an after-visit summary and questions were answered concerning future plans. Pt conveyed understanding of plan. La Nena Cueto MD    Please note that this dictation was completed with [employer], the computer voice recognition software. Quite often unanticipated grammatical, syntax, homophones, and other interpretive errors are inadvertently transcribed by the computer software. Please disregard these errors. Please excuse any errors that have escaped final proofreading.

## 2022-03-18 PROBLEM — E55.9 VITAMIN D DEFICIENCY: Status: ACTIVE | Noted: 2017-10-03

## 2022-03-18 PROBLEM — L30.9 ECZEMA: Status: ACTIVE | Noted: 2017-10-03

## 2022-03-18 PROBLEM — Z79.01 LONG TERM CURRENT USE OF ANTICOAGULANT THERAPY: Status: ACTIVE | Noted: 2017-10-03

## 2022-03-18 PROBLEM — M17.11 PRIMARY OSTEOARTHRITIS OF RIGHT KNEE: Status: ACTIVE | Noted: 2021-05-04

## 2022-03-19 PROBLEM — M54.9 MID BACK PAIN: Status: ACTIVE | Noted: 2018-12-14

## 2022-03-19 PROBLEM — I82.409 RECURRENT DEEP VEIN THROMBOSIS (DVT) (HCC): Status: ACTIVE | Noted: 2017-10-03

## 2022-03-19 PROBLEM — Z85.46 HISTORY OF PROSTATE CANCER: Status: ACTIVE | Noted: 2020-03-26

## 2022-03-19 PROBLEM — C64.9 RENAL CELL CANCER (HCC): Status: ACTIVE | Noted: 2017-10-03

## 2022-03-19 PROBLEM — E78.00 HYPERCHOLESTEROLEMIA: Status: ACTIVE | Noted: 2017-10-03

## 2022-03-19 PROBLEM — M85.80 OSTEOPENIA: Status: ACTIVE | Noted: 2017-10-03

## 2022-03-19 PROBLEM — Z79.899 LONG-TERM USE OF HIGH-RISK MEDICATION: Status: ACTIVE | Noted: 2017-10-03

## 2022-03-19 PROBLEM — K21.9 GASTROESOPHAGEAL REFLUX DISEASE WITHOUT ESOPHAGITIS: Status: ACTIVE | Noted: 2017-10-03

## 2022-03-19 PROBLEM — N52.9 ED (ERECTILE DYSFUNCTION) OF ORGANIC ORIGIN: Status: ACTIVE | Noted: 2017-10-03

## 2022-03-19 PROBLEM — M19.112 POST-TRAUMATIC OSTEOARTHRITIS OF LEFT SHOULDER: Status: ACTIVE | Noted: 2020-05-01

## 2022-03-19 PROBLEM — D68.51 FACTOR V LEIDEN MUTATION (HCC): Status: ACTIVE | Noted: 2017-10-03

## 2022-04-25 RX ORDER — PRAVASTATIN SODIUM 40 MG/1
40 TABLET ORAL DAILY
Qty: 90 TABLET | Refills: 0 | Status: SHIPPED | OUTPATIENT
Start: 2022-04-25 | End: 2022-07-25

## 2022-04-25 NOTE — TELEPHONE ENCOUNTER
Requested Prescriptions     Pending Prescriptions Disp Refills    pravastatin (PRAVACHOL) 40 mg tablet 90 Tablet 1     Sig: Take 1 Tablet by mouth daily.        Last Refill: 10/23/21  Next Appointment:5/11/22

## 2022-05-02 ENCOUNTER — HOSPITAL ENCOUNTER (OUTPATIENT)
Dept: GENERAL RADIOLOGY | Age: 82
Discharge: HOME | End: 2022-05-02
Payer: MEDICARE

## 2022-05-02 ENCOUNTER — TRANSCRIBE ORDER (OUTPATIENT)
Dept: REGISTRATION | Age: 82
End: 2022-05-02

## 2022-05-02 DIAGNOSIS — Z85.528 HISTORY OF RENAL CELL CARCINOMA: Primary | ICD-10-CM

## 2022-05-02 DIAGNOSIS — Z85.528 HISTORY OF RENAL CELL CARCINOMA: ICD-10-CM

## 2022-05-02 PROCEDURE — 71046 X-RAY EXAM CHEST 2 VIEWS: CPT

## 2022-05-06 PROBLEM — M54.50 LUMBAGO: Status: ACTIVE | Noted: 2017-10-03

## 2022-05-06 PROBLEM — Z79.01 CURRENT USE OF LONG TERM ANTICOAGULATION: Status: ACTIVE | Noted: 2017-10-03

## 2022-05-06 PROBLEM — K29.80 DUODENITIS: Status: ACTIVE | Noted: 2017-10-03

## 2022-05-06 PROBLEM — Z87.09 HISTORY OF ALLERGIC RHINITIS: Status: ACTIVE | Noted: 2017-10-03

## 2022-05-06 PROBLEM — K63.5 COLON POLYPS: Status: ACTIVE | Noted: 2017-10-03

## 2022-05-06 PROBLEM — B02.29 HERPES ZOSTER VIRUS INFECTION OF FACE AND EAR NERVES: Status: ACTIVE | Noted: 2017-10-03

## 2022-05-06 PROBLEM — J30.9 ALLERGIC RHINITIS: Status: ACTIVE | Noted: 2017-10-03

## 2022-05-06 PROBLEM — C61 PROSTATIC CANCER (HCC): Status: ACTIVE | Noted: 2017-10-03

## 2022-05-06 PROBLEM — Z87.81 HISTORY OF TOE FRACTURE: Status: ACTIVE | Noted: 2017-10-03

## 2022-05-11 ENCOUNTER — OFFICE VISIT (OUTPATIENT)
Dept: INTERNAL MEDICINE CLINIC | Age: 82
End: 2022-05-11
Payer: MEDICARE

## 2022-05-11 VITALS
SYSTOLIC BLOOD PRESSURE: 150 MMHG | WEIGHT: 172.9 LBS | HEIGHT: 73 IN | OXYGEN SATURATION: 95 % | TEMPERATURE: 97.9 F | DIASTOLIC BLOOD PRESSURE: 88 MMHG | HEART RATE: 65 BPM | BODY MASS INDEX: 22.92 KG/M2 | RESPIRATION RATE: 14 BRPM

## 2022-05-11 DIAGNOSIS — D68.51 FACTOR V LEIDEN MUTATION (HCC): ICD-10-CM

## 2022-05-11 DIAGNOSIS — E78.00 HYPERCHOLESTEROLEMIA: ICD-10-CM

## 2022-05-11 DIAGNOSIS — Z00.00 ROUTINE ADULT HEALTH MAINTENANCE: Primary | ICD-10-CM

## 2022-05-11 DIAGNOSIS — I82.409 RECURRENT DEEP VEIN THROMBOSIS (DVT) (HCC): ICD-10-CM

## 2022-05-11 DIAGNOSIS — M85.80 OSTEOPENIA, UNSPECIFIED LOCATION: ICD-10-CM

## 2022-05-11 DIAGNOSIS — R03.0 ELEVATED BP WITHOUT DIAGNOSIS OF HYPERTENSION: ICD-10-CM

## 2022-05-11 DIAGNOSIS — Z00.00 MEDICARE ANNUAL WELLNESS VISIT, SUBSEQUENT: ICD-10-CM

## 2022-05-11 DIAGNOSIS — I74.9 THROMBOEMBOLUS (HCC): ICD-10-CM

## 2022-05-11 DIAGNOSIS — E55.9 VITAMIN D DEFICIENCY: ICD-10-CM

## 2022-05-11 DIAGNOSIS — Z79.01 LONG TERM CURRENT USE OF ANTICOAGULANT THERAPY: ICD-10-CM

## 2022-05-11 DIAGNOSIS — C64.9 RENAL CELL CARCINOMA, UNSPECIFIED LATERALITY (HCC): ICD-10-CM

## 2022-05-11 PROCEDURE — 99214 OFFICE O/P EST MOD 30 MIN: CPT | Performed by: INTERNAL MEDICINE

## 2022-05-11 PROCEDURE — G0439 PPPS, SUBSEQ VISIT: HCPCS | Performed by: INTERNAL MEDICINE

## 2022-05-11 NOTE — PROGRESS NOTES
Chief Complaint   Patient presents with   [facility] Establish Care         1. \"Have you been to the ER, urgent care clinic since your last visit? Hospitalized since your last visit? \" No    2. \"Have you seen or consulted any other health care providers outside of the [address] [place] [address] since your last visit? \" No     3. For patients aged 39-70: Has the patient had a colonoscopy / FIT/ Cologuard? No      If the patient is female:    4. For patients aged 41-77: Has the patient had a mammogram within the past 2 years? No      5. For patients aged 21-65: Has the patient had a pap smear?  No

## 2022-05-11 NOTE — PATIENT INSTRUCTIONS
Medicare Wellness Visit, Male    The best way to live healthy is to have a lifestyle where you eat a well-balanced diet, exercise regularly, limit alcohol use, and quit all forms of tobacco/nicotine, if applicable. Regular preventive services are another way to keep healthy. Preventive services (vaccines, screening tests, monitoring & exams) can help personalize your care plan, which helps you manage your own care. Screening tests can find health problems at the earliest stages, when they are easiest to treat. [facility] follows the current, evidence-based guidelines published by the [facility] ([facility]) when recommending preventive services for our patients. Because we follow these guidelines, sometimes recommendations change over time as research supports it. (For example, a prostate screening blood test is no longer routinely recommended for men with no symptoms). Of course, you and your doctor may decide to screen more often for some diseases, based on your risk and co-morbidities (chronic disease you are already diagnosed with). Preventive services for you include:  - Medicare offers their members a free annual wellness visit, which is time for you and your primary care provider to discuss and plan for your preventive service needs. Take advantage of this benefit every year!  -All adults over age 72 should receive the recommended pneumonia vaccines. Current USPSTF guidelines recommend a series of two vaccines for the best pneumonia protection.   -All adults should have a flu vaccine yearly and tetanus vaccine every 10 years.  -All adults age 48 and older should receive the shingles vaccines (series of two vaccines).        -All adults age 38-68 who are overweight should have a diabetes screening test once every three years.   -Other screening tests & preventive services for persons with diabetes include: an eye exam to screen for diabetic retinopathy, a kidney function test, a foot exam, and stricter control over your cholesterol.   -Cardiovascular screening for adults with routine risk involves an electrocardiogram (ECG) at intervals determined by the provider.   -Colorectal cancer screening should be done for adults age 54-65 with no increased risk factors for colorectal cancer. There are a number of acceptable methods of screening for this type of cancer. Each test has its own benefits and drawbacks. Discuss with your provider what is most appropriate for you during your annual wellness visit. The different tests include: colonoscopy (considered the best screening method), a fecal occult blood test, a fecal DNA test, and sigmoidoscopy.  -All adults born between [facility] should be screened once for Hepatitis C.  -An Abdominal Aortic Aneurysm (AAA) Screening is recommended for men age 73-68 who has ever smoked in their lifetime. Here is a list of your current Health Maintenance items (your personalized list of preventive services) with a due date: There are no preventive care reminders to display for this patient.

## 2022-05-12 LAB
25(OH)D3 SERPL-MCNC: 108.7 NG/ML (ref 30–100)
ANION GAP SERPL CALC-SCNC: 6 MMOL/L (ref 5–15)
BUN SERPL-MCNC: 21 MG/DL (ref 6–20)
BUN/CREAT SERPL: 20 (ref 12–20)
CALCIUM SERPL-MCNC: 9.7 MG/DL (ref 8.5–10.1)
CHLORIDE SERPL-SCNC: 108 MMOL/L (ref 97–108)
CO2 SERPL-SCNC: 27 MMOL/L (ref 21–32)
CREAT SERPL-MCNC: 1.07 MG/DL (ref 0.7–1.3)
GLUCOSE SERPL-MCNC: 93 MG/DL (ref 65–100)
POTASSIUM SERPL-SCNC: 4 MMOL/L (ref 3.5–5.1)
SODIUM SERPL-SCNC: 141 MMOL/L (ref 136–145)

## 2022-05-13 NOTE — PROGRESS NOTES
Please contact this patient to let them know that the results of this recent test was unremarkable. Please have them schedule routine follow-up with me as necessary. Discussed results with patient. F/up as previously scheduled.

## 2022-05-31 ENCOUNTER — TELEPHONE (OUTPATIENT)
Dept: INTERNAL MEDICINE CLINIC | Age: 82
End: 2022-05-31

## 2022-05-31 NOTE — TELEPHONE ENCOUNTER
PCP: Queen Dayan MD    Last appt: 5/11/2022  Future Appointments   Date Time Provider Cici Guzman   11/18/2022 11:00 AM Queen Dayan MD [facility]       Requested Prescriptions     Pending Prescriptions Disp Refills    rivaroxaban (Xarelto) 20 mg tab tablet 90 Tablet 1     Sig: Take 1 Tablet by mouth daily.        Prior labs and Blood pressures:  BP Readings from Last 3 Encounters:   05/11/22 (!) 150/88   12/22/21 116/74   11/08/21 118/78     Lab Results   Component Value Date/Time    Sodium 141 05/11/2022 10:10 AM    Potassium 4.0 05/11/2022 10:10 AM    Chloride 108 05/11/2022 10:10 AM    CO2 27 05/11/2022 10:10 AM    Anion gap 6 05/11/2022 10:10 AM    Glucose 93 05/11/2022 10:10 AM    BUN 21 (H) 05/11/2022 10:10 AM    Creatinine 1.07 05/11/2022 10:10 AM    BUN/Creatinine ratio 20 05/11/2022 10:10 AM    GFR est AA >60 05/11/2022 10:10 AM    GFR est non-AA >60 05/11/2022 10:10 AM    Calcium 9.7 05/11/2022 10:10 AM     No results found for: HBA1C, [id number], [id number]  Lab Results   Component Value Date/Time    Cholesterol, total 174 11/08/2021 10:27 AM    Cholesterol (POC) 156.0 01/22/2018 10:51 AM    HDL Cholesterol 78 11/08/2021 10:27 AM    HDL Cholesterol (POC) 68.0 01/22/2018 10:51 AM    LDL Cholesterol (POC) 65.0 01/22/2018 10:51 AM    LDL, calculated 72.6 11/08/2021 10:27 AM    VLDL, calculated 23.4 11/08/2021 10:27 AM    Triglyceride 117 11/08/2021 10:27 AM    Triglycerides (POC) 115.0 01/22/2018 10:51 AM    CHOL/HDL Ratio 2.2 11/08/2021 10:27 AM     Lab Results   Component Value Date/Time    Vitamin D 25-Hydroxy 108.7 (H) 05/11/2022 10:10 AM       Lab Results   Component Value Date/Time    TSH 1.78 11/08/2021 10:27 AM    TSH, 3rd generation 1.50 11/02/2020 10:06 AM

## 2022-05-31 NOTE — TELEPHONE ENCOUNTER
Patient called and stated that he was advised by Dr. Rezno Terry to monitor his blood pressure from home. This morning he stated that his blood pressure was 160/97 and he is a bit anxious with how high it is. Would like to get advice from Dr. Renzo Terry as to what to do about this high reading. Stated that it is ok to leave a message and that he would be available tomorrow at this number.

## 2022-05-31 NOTE — TELEPHONE ENCOUNTER
Documentation is noted. The patient's blood pressure is indeed elevated today, but I would not want to make significant changes to his medication regimen without an overview of the overall trend. Please advise the patient that as long as he is not having any significant symptoms such as chest pain, difficulty breathing, or changes in his ability to exercise, then we can continue monitoring his blood pressure at home and the way that he is doing. Additional medication for blood pressure control may in fact be indicated, but this would be based upon his blood pressure trends over time and his average blood pressure readings, not on any 1 single blood pressure.

## 2022-06-03 ENCOUNTER — OFFICE VISIT (OUTPATIENT)
Dept: INTERNAL MEDICINE CLINIC | Age: 82
End: 2022-06-03
Payer: MEDICARE

## 2022-06-03 VITALS
RESPIRATION RATE: 14 BRPM | HEIGHT: 73 IN | WEIGHT: 171.9 LBS | DIASTOLIC BLOOD PRESSURE: 98 MMHG | OXYGEN SATURATION: 96 % | BODY MASS INDEX: 22.78 KG/M2 | TEMPERATURE: 98.3 F | SYSTOLIC BLOOD PRESSURE: 176 MMHG | HEART RATE: 68 BPM

## 2022-06-03 DIAGNOSIS — I10 ESSENTIAL HYPERTENSION: Primary | ICD-10-CM

## 2022-06-03 DIAGNOSIS — E78.00 HYPERCHOLESTEROLEMIA: ICD-10-CM

## 2022-06-03 DIAGNOSIS — D68.51 FACTOR V LEIDEN MUTATION (HCC): ICD-10-CM

## 2022-06-03 PROCEDURE — 1123F ACP DISCUSS/DSCN MKR DOCD: CPT | Performed by: INTERNAL MEDICINE

## 2022-06-03 PROCEDURE — 99214 OFFICE O/P EST MOD 30 MIN: CPT | Performed by: INTERNAL MEDICINE

## 2022-06-03 RX ORDER — AMLODIPINE BESYLATE 2.5 MG/1
2.5 TABLET ORAL DAILY
Qty: 30 TABLET | Refills: 11 | Status: SHIPPED | OUTPATIENT
Start: 2022-06-03 | End: 2022-09-08

## 2022-06-03 NOTE — PATIENT INSTRUCTIONS
Please continue monitoring your blood pressure at home using your home blood pressure monitor. Please record your readings and bring these with you to your next visit. We are starting amlodipine for your blood pressure today. This is intended to lower your blood pressure while you make dietary modifications. Please work on eliminating sodium from your diet. This may require you to change what you are eating. Please do not take the amlodipine if your systolic blood pressure is less than 130 mmHg. DASH Diet: Care Instructions  Your Care Instructions     The DASH diet is an eating plan that can help lower your blood pressure. DASH stands for Dietary Approaches to Stop Hypertension. Hypertension is high blood pressure. The DASH diet focuses on eating foods that are high in calcium, potassium, and magnesium. These nutrients can lower blood pressure. The foods that are highest in these nutrients are fruits, vegetables, low-fat dairy products, nuts, seeds, and legumes. But taking calcium, potassium, and magnesium supplements instead of eating foods that are high in those nutrients does not have the same effect. The DASH diet also includes whole grains, fish, and poultry. The DASH diet is one of several lifestyle changes your doctor may recommend to lower your high blood pressure. Your doctor may also want you to decrease the amount of sodium in your diet. Lowering sodium while following the DASH diet can lower blood pressure even further than just the DASH diet alone. Follow-up care is a key part of your treatment and safety. Be sure to make and go to all appointments, and call your doctor if you are having problems. It's also a good idea to know your test results and keep a list of the medicines you take. How can you care for yourself at home? Following the DASH diet  · Eat 4 to 5 servings of fruit each day.  A serving is 1 medium-sized piece of fruit, ½ cup chopped or canned fruit, 1/4 cup dried fruit, or 4 ounces (½ cup) of fruit juice. Choose fruit more often than fruit juice. · Eat 4 to 5 servings of vegetables each day. A serving is 1 cup of lettuce or raw leafy vegetables, ½ cup of chopped or cooked vegetables, or 4 ounces (½ cup) of vegetable juice. Choose vegetables more often than vegetable juice. · Get 2 to 3 servings of low-fat and fat-free dairy each day. A serving is 8 ounces of milk, 1 cup of yogurt, or 1 ½ ounces of cheese. · Eat 6 to 8 servings of grains each day. A serving is 1 slice of bread, 1 ounce of dry cereal, or ½ cup of cooked rice, pasta, or cooked cereal. Try to choose whole-grain products as much as possible. · Limit lean meat, poultry, and fish to 2 servings each day. A serving is 3 ounces, about the size of a deck of cards. · Eat 4 to 5 servings of nuts, seeds, and legumes (cooked dried beans, lentils, and split peas) each week. A serving is 1/3 cup of nuts, 2 tablespoons of seeds, or ½ cup of cooked beans or peas. · Limit fats and oils to 2 to 3 servings each day. A serving is 1 teaspoon of vegetable oil or 2 tablespoons of salad dressing. · Limit sweets and added sugars to 5 servings or less a week. A serving is 1 tablespoon jelly or jam, ½ cup sorbet, or 1 cup of lemonade. · Eat less than 2,300 milligrams (mg) of sodium a day. If you limit your sodium to 1,500 mg a day, you can lower your blood pressure even more. · Be aware that all of these are the suggested number of servings for people who eat 1,800 to 2,000 calories a day. Your recommended number of servings may be different if you need more or fewer calories. Tips for success  · Start small. Do not try to make dramatic changes to your diet all at once. You might feel that you are missing out on your favorite foods and then be more likely to not follow the plan. Make small changes, and stick with them. Once those changes become habit, add a few more changes. · Try some of the following:  ?  Make it a goal to eat a fruit or vegetable at every meal and at snacks. This will make it easy to get the recommended amount of fruits and vegetables each day. ? Try yogurt topped with fruit and nuts for a snack or healthy dessert. ? Add lettuce, tomato, cucumber, and onion to sandwiches. ? Combine a ready-made pizza crust with low-fat mozzarella cheese and lots of vegetable toppings. Try using tomatoes, squash, spinach, broccoli, carrots, cauliflower, and onions. ? Have a variety of cut-up vegetables with a low-fat dip as an appetizer instead of chips and dip. ? Sprinkle sunflower seeds or chopped almonds over salads. Or try adding chopped walnuts or almonds to cooked vegetables. ? Try some vegetarian meals using beans and peas. Add garbanzo or kidney beans to salads. Make burritos and tacos with mashed seymour beans or black beans. Where can you learn more? Go to http://www.tyler.com/  Enter H967 in the search box to learn more about \"DASH Diet: Care Instructions. \"  Current as of: January 10, 2022               Content Version: 13.2  © 2006-2022 [employer]. Care instructions adapted under license by "[employer] (which disclaims liability or warranty for this information). If you have questions about a medical condition or this instruction, always ask your healthcare professional. [address] any warranty or liability for your use of this information. Amlodipine (By mouth)   Amlodipine ([id number])  Treats high blood pressure and angina (chest pain). This medicine is a calcium channel blocker. Brand Name(s): Norvasc   There may be other brand names for this medicine. When This Medicine Should Not Be Used: This medicine is not right for everyone. Do not use it if you had an allergic reaction to amlodipine. How to Use This Medicine:   Tablet, Dissolving Tablet  · Take your medicine as directed.  Your dose may need to be changed several times to find what works best for you. Take this medicine at the same time each day. · Read and follow the patient instructions that come with this medicine. Talk to your doctor or pharmacist if you have any questions. · Missed dose: Take a dose as soon as you remember. If it has been more than 12 hours since you were supposed to take your dose, skip the missed dose and take your next regular dose at the regular time. · Store the medicine in a closed container at room temperature, away from heat, moisture, and direct light. Drugs and Foods to Avoid:   Ask your doctor or pharmacist before using any other medicine, including over-the-counter medicines, vitamins, and herbal products. · Some medicines can affect how amlodipine works. Tell your doctor if you are also using any of the following:   ¨ Clarithromycin, cyclosporine, diltiazem, itraconazole, ritonavir, sildenafil, simvastatin, tacrolimus  Warnings While Using This Medicine:   · Tell your doctor if you are pregnant or breastfeeding, or if you have liver disease, heart disease, coronary artery disease, or aortic stenosis. · This medicine could lower your blood pressure too much, especially when you first use it or if you are dehydrated. Stand or sit up slowly if you feel lightheaded or dizzy. · Your doctor will check your progress and the effects of this medicine at regular visits. Keep all appointments. · Do not stop using this medicine without asking your doctor, even if you feel well. This medicine will not cure high blood pressure, but it will help keep it in normal range. You may have to take blood pressure medicine for the rest of your life. · Keep all medicine out of the reach of children. Never share your medicine with anyone.   Possible Side Effects While Using This Medicine:   Call your doctor right away if you notice any of these side effects:  · Allergic reaction: Itching or hives, swelling in your face or hands, swelling or tingling in your mouth or throat, chest tightness, trouble breathing  · Lightheadedness, dizziness  · New or worsening chest pain  · Swelling in your hands, ankles, or legs  · Trouble breathing, nausea, unusual sweating, fainting  If you notice other side effects that you think are caused by this medicine, tell your doctor. Call your doctor for medical advice about side effects. You may report side effects to FDA at 0-112-SKW-2303  © 2017 [facility] Information is for End User's use only and may not be sold, redistributed or otherwise used for commercial purposes. The above information is an  only. It is not intended as medical advice for individual conditions or treatments. Talk to your doctor, nurse or pharmacist before following any medical regimen to see if it is safe and effective for you.

## 2022-06-03 NOTE — PROGRESS NOTES
Chief Complaint   Patient presents with    Elevated Blood Pressure         1. \"Have you been to the ER, urgent care clinic since your last visit? Hospitalized since your last visit? \" No    2. \"Have you seen or consulted any other health care providers outside of the [address] [place] [address] since your last visit? \" No     3. For patients aged 39-70: Has the patient had a colonoscopy / FIT/ Cologuard? No      If the patient is female:    4. For patients aged 41-77: Has the patient had a mammogram within the past 2 years? No      5. For patients aged 21-65: Has the patient had a pap smear?  No

## 2022-06-03 NOTE — PROGRESS NOTES
Subjective:     Chief Complaint   Patient presents with    Elevated Blood Pressure       Chao Shore is a 80 y.o. M. He has a history of factor V Leiden mutation, prostate cancer, kidney disease, and osteopenia. He was seen most recently earlier in May for a routine Medicare wellness examination and establishment. He was using Xarelto for his history of prior DVT and pravastatin for his history of hypercholesterolemia. His physical examination at the time was mainly notable for hypertension. No changes were made to his medication regimen and he was advised to obtain additional laboratory studies. Follow-up blood pressure had been normal at less than 140/90 mmHg. Follow-up in 6 months had been advised. Mr. Abner Chamberlain returns to clinic today primarily to discuss his hypertension. He says that he had been to his orthopedist recently to obtain injections on his knee for osteoarthritis. His blood pressure was noted to be elevated to the 315 mmHg systolic range, and he had been advised to follow-up closely with primary care. He comes in today to discuss this further. On further questioning, the patient denies having had any symptoms of chest pain, shortness of breath, or any further cardiopulmonary symptoms. He is not currently taking any medications whatsoever for hypertension. He denies any personal history of heart disease or stroke. He admits that he has been under increased amounts of stress over the past several months as his wife has been dealing with several health issues. During this time, his [employer] friends have been providing many of their meals, and he says that the meals that they cooked tend to be more heavily salted than the way he typically cooks. He has been measuring his blood pressure readings at home, and over the past few months has noted a steady increase in his blood pressure readings.   These are now typically in the 100 6170 mmHg range on his cuff at home, occasionally getting into the 643 mmHg systolic range. During this time, he has had no orthopnea, paroxysmal nocturnal dyspnea, or any other heart failure symptoms or any other focal neurological symptoms. He notes that the blood pressure reading increase had occurred prior to the receipt of his steroid injections. He otherwise reports feeling about the same as usual and reports tolerating his other medications including pravastatin without side effects. His review of systems is otherwise negative. 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/2017    Factor V Leiden mutation ([facility] [address] 10/3/2017    Gastroesophageal reflux disease without esophagitis 10/3/2017    Herpes zoster virus infection of face and ear nerves 10/3/2017    History of allergic rhinitis 10/03/2017    purulent    History of DVT (deep vein thrombosis)     DVT    History of prostate cancer     Prostate    History of toe fracture 10/03/2017    left sided    Hypercholesterolemia 10/3/2017    Lumbago 10/03/2017    Osteopenia 10/03/2017    FRAX 2021 = 13/5.6%    Other ill-defined conditions(799.89)     doctor does not want him to have quinolones family -causes achilles tendonitis    Recurrent deep vein thrombosis (DVT) ([facility] [address] 10/3/2017    Renal cell cancer ([facility] [address] 10/3/2017    Vitamin D deficiency 10/3/2017       Past Surgical Histor:  Past Surgical History:   Procedure Laterality Date    COLORECTAL SCRN; HI RISK IND  2/3/2015         HX ORTHOPAEDIC      cortisone shot in knee and elbow    HX OTHER SURGICAL      colonoscopy    HX TONSILLECTOMY      HX UROLOGICAL Right     Kidney Removal       Allergies:   Allergies   Allergen Reactions    Quinolones Unknown (comments)       Medications:  Current Outpatient Medications   Medication Sig Dispense Refill    amLODIPine (NORVASC) 2.5 mg tablet Take 1 Tablet by mouth daily for 360 days. HOLD for Systolic Blood Pressure < 130 mmHg 30 Tablet 11    rivaroxaban (Xarelto) 20 mg tab tablet Take 1 Tablet by mouth daily for 360 days. 90 Tablet 3    pravastatin (PRAVACHOL) 40 mg tablet Take 1 Tablet by mouth daily. 90 Tablet 0    ergocalciferol (ERGOCALCIFEROL) 1,250 mcg (50,000 unit) capsule TAKE ONE CAPSULE BY MOUTH ONCE WEEKLY 12 Capsule 1    cyanocobalamin (VITAMIN B-12) 500 mcg tablet Take 500 mcg by mouth daily.  calcium carbonate (TUMS) 200 mg calcium (500 mg) chew Take 2 tablets by mouth daily.  calcium-cholecalciferol, d3, (CALCIUM 600 + D) 600-125 mg-unit tab Take 1 tablet by mouth daily.  acetaminophen (TYLENOL EXTRA STRENGTH) 500 mg tablet Take 1,000 mg by mouth every six (6) hours as needed for Pain.  diphenhydrAMINE (BENADRYL) 25 mg capsule Take 25 mg by mouth every six (6) hours as needed for Itching or Sleep (allergies).          Social History:  Social History     Socioeconomic History    Marital status:    Tobacco Use    Smoking status: Never Smoker    Smokeless tobacco: Never Used   Vaping Use    Vaping Use: Never used   Substance and Sexual Activity    Alcohol use: No    Drug use: No       Family History:  Family History   Problem Relation Age of Onset    Hypertension Mother     Other Mother         abdominal aneurysm    Cancer Father         bladder        Immunizations:  Immunization History   Administered Date(s) Administered    COVID-19, Pfizer Purple top, DILUTE for use, 12+ yrs, 30mcg/0.3mL dose 01/28/2021, 02/25/2021, 11/02/2021    Influenza High Dose Vaccine PF 09/22/2017    Influenza Vaccine 09/25/2014, 10/19/2015, 10/26/2016    Influenza Vaccine (Tri) Adjuvanted (>65 Yrs FLUAD TRI 20710) 09/21/2018, 11/07/2019    Influenza, Quadrivalent, Adjuvanted (>65 Yrs FLUAD QUAD 48595) 10/12/2020, 10/22/2021    Pneumococcal Conjugate (PCV-13) 10/19/2015    Pneumococcal Polysaccharide (PPSV-23) 12/01/2005    Tdap 08/02/2018    Zoster Recombinant 01/20/2019, 05/28/2019        Healthcare Maintenance:  Health Maintenance   Topic Date Due    Lipid Screen  11/08/2022    Medicare Yearly Exam  05/12/2023    Depression Screen  06/03/2023    DTaP/Tdap/Td series (2 - Td or Tdap) 08/02/2028    Shingrix Vaccine Age 50>  Completed    Flu Vaccine  Completed    COVID-19 Vaccine  Completed    Pneumococcal 65+ years  Completed        Review of Systems:  ROS:  Review of Systems   Constitutional: Negative. HENT: Negative. Eyes: Negative. Respiratory: Negative. Cardiovascular: Negative. Gastrointestinal: Negative. Genitourinary: Negative. Musculoskeletal: Negative. Skin: Negative. Neurological: Negative. Endo/Heme/Allergies: Negative. Psychiatric/Behavioral: Negative. ROS otherwise negative      Objective:     Vital Signs:  Visit Vitals  BP (!) 176/98 (BP 1 Location: Right arm, BP Patient Position: Sitting, BP Cuff Size: Adult)   Pulse 68   Temp 98.3 °F (36.8 °C) (Oral)   Resp 14   Ht 6' 1\" (1.854 m)   Wt 171 lb 14.4 oz (78 kg)   SpO2 96%   BMI 22.68 kg/m²       BMI:  Body mass index is 22.68 kg/m². Physical Examination:  Physical Exam  Vitals reviewed. Constitutional:       Appearance: Normal appearance. HENT:      Head: Normocephalic and atraumatic. Nose: Nose normal.      Mouth/Throat:      Mouth: Mucous membranes are moist.   Eyes:      Extraocular Movements: Extraocular movements intact. Conjunctiva/sclera: Conjunctivae normal.      Pupils: Pupils are equal, round, and reactive to light. Cardiovascular:      Rate and Rhythm: Normal rate and regular rhythm. Pulses: Normal pulses. Heart sounds: Normal heart sounds. No murmur heard. No friction rub. No gallop. Pulmonary:      Effort: Pulmonary effort is normal. No respiratory distress.       Breath sounds: Normal breath sounds. No wheezing, rhonchi or rales. Abdominal:      General: Bowel sounds are normal. There is no distension. Palpations: Abdomen is soft. There is no mass. Tenderness: There is no abdominal tenderness. There is no guarding or rebound. Musculoskeletal:         General: No tenderness, deformity or signs of injury. Normal range of motion. Cervical back: Normal range of motion and neck supple. Right lower leg: No edema. Left lower leg: No edema. Skin:     General: Skin is warm and dry. Findings: No bruising, lesion or rash. Neurological:      General: No focal deficit present. Mental Status: He is alert and oriented to person, place, and time. Mental status is at baseline. Cranial Nerves: No cranial nerve deficit. Sensory: No sensory deficit. Motor: No weakness. Coordination: Coordination normal.      Gait: Gait normal.      Deep Tendon Reflexes: Reflexes normal.   Psychiatric:         Mood and Affect: Mood normal.         Behavior: Behavior normal.          Physical exam otherwise negative    Diagnostic Testing:    Laboratory Studies:  Office Visit on 05/11/2022   Component Date Value Ref Range Status    Vitamin D 25-Hydroxy 05/11/2022 108.7* 30 - 100 ng/mL Final    Comment: (NOTE)  Deficiency               <20 ng/mL  Insufficiency          20-30 ng/mL  Sufficient             ng/mL  Possible toxicity       >100 ng/mL    The Method used is Siemens Advia Centaur currently standardized to a   Center of Disease Control and Prevention (CDC) certified reference   method. Samples containing fluorescein dye can produce falsely   elevated values when tested with the ADVIA Centaur Vitamin D Assay. It is recommended that results in the toxic range, >100 ng/mL, be   retested 72 hours post fluorescein exposure.       Sodium 05/11/2022 141  136 - 145 mmol/L Final    Potassium 05/11/2022 4.0  3.5 - 5.1 mmol/L Final    Chloride 05/11/2022 108 97 - 108 mmol/L Final    CO2 05/11/2022 27  21 - 32 mmol/L Final    Anion gap 05/11/2022 6  5 - 15 mmol/L Final    Glucose 05/11/2022 93  65 - 100 mg/dL Final    BUN 05/11/2022 21* 6 - 20 MG/DL Final    Creatinine 05/11/2022 1.07  0.70 - 1.30 MG/DL Final    BUN/Creatinine ratio 05/11/2022 20  12 - 20   Final    GFR est AA 05/11/2022 >60  >60 ml/min/1.73m2 Final    GFR est non-AA 05/11/2022 >60  >60 ml/min/1.73m2 Final    Comment: Estimated GFR is calculated using the IDMS-traceable Modification of Diet in  Renal Disease (MDRD) Study equation, reported for both  Americans  (GFRAA) and non- Americans (GFRNA), and normalized to 1.73m2 body  surface area. The physician must decide which value applies to the patient.  Calcium 05/11/2022 9.7  8.5 - 10.1 MG/DL Final   Office Visit on 11/08/2021   Component Date Value Ref Range Status    Vitamin D 25-Hydroxy 11/08/2021 78.7  30 - 100 ng/mL Final    Comment: (NOTE)  Deficiency               <20 ng/mL  Insufficiency          20-30 ng/mL  Sufficient             ng/mL  Possible toxicity       >100 ng/mL    The Method used is Siemens Advia Centaur currently standardized to a   Center of Disease Control and Prevention (CDC) certified reference   [address]. Samples containing fluorescein dye can produce falsely   elevated values when tested with the ADVIA Centaur Vitamin D Assay. It is recommended that results in the toxic range, >100 ng/mL, be   retested 72 hours post fluorescein exposure.  TSH 11/08/2021 1.78  0.36 - 3.74 uIU/mL Final    Comment:   Due to TSH heterogeneity, both structurally and degree of glycosylation,  monoclonal antibodies used in the TSH assay may not accurately quantitate TSH. Therefore, this result should be correlated with clinical findings as well as  with other assessments of thyroid function, e.g., free T4, free T3.       Sodium 11/08/2021 141  136 - 145 mmol/L Final    Potassium 11/08/2021 4.6  3.5 - 5.1 mmol/L Final    Chloride 11/08/2021 106  97 - 108 mmol/L Final    CO2 11/08/2021 32  21 - 32 mmol/L Final    Anion gap 11/08/2021 3* 5 - 15 mmol/L Final    Glucose 11/08/2021 100  65 - 100 mg/dL Final    BUN 11/08/2021 25* 6 - 20 MG/DL Final    Creatinine 11/08/2021 1.11  0.70 - 1.30 MG/DL Final    BUN/Creatinine ratio 11/08/2021 23* 12 - 20   Final    GFR est AA 11/08/2021 >60  >60 ml/min/1.73m2 Final    GFR est non-AA 11/08/2021 >60  >60 ml/min/1.73m2 Final    Comment: Estimated GFR is calculated using the IDMS-traceable Modification of Diet in  Renal Disease (MDRD) Study equation, reported for both  Americans  (GFRAA) and non- Americans (GFRNA), and normalized to 1.73m2 body  surface area. The physician must decide which value applies to the patient.  Calcium 11/08/2021 9.9  8.5 - 10.1 MG/DL Final    Bilirubin, total 11/08/2021 0.7  0.2 - 1.0 MG/DL Final    ALT (SGPT) 11/08/2021 28  12 - 78 U/L Final    AST (SGOT) 11/08/2021 17  15 - 37 U/L Final    Alk. phosphatase 11/08/2021 58  45 - 117 U/L Final    Protein, total 11/08/2021 7.0  6.4 - 8.2 g/dL Final    Albumin 11/08/2021 4.0  3.5 - 5.0 g/dL Final    Globulin 11/08/2021 3.0  2.0 - 4.0 g/dL Final    A-G Ratio 11/08/2021 1.3  1.1 - 2.2   Final    Cholesterol, total 11/08/2021 174  <200 MG/DL Final    Triglyceride 11/08/2021 117  <150 MG/DL Final    Comment: Based on NCEP-ATP III:  Triglycerides <150 mg/dL  is considered normal, 150-199  mg/dL  borderline high,  200-499 mg/dL high and  greater than or equal to 500  mg/dL very high.  HDL Cholesterol 11/08/2021 78  MG/DL Final    Comment: Based on NCEP ATP III, HDL Cholesterol <40 mg/dL is considered low and >60  mg/dL is elevated.       LDL, calculated 11/08/2021 72.6  0 - 100 MG/DL Final    Comment: Based on the NCEP-ATP: LDL-C concentrations are considered  optimal <100 mg/dL,  near optimal/above Normal 100-129 mg/dL Borderline High: 130-159, High: 160-189  mg/dL Very High: Greater than or equal to 190 mg/dL      VLDL, calculated 11/08/2021 23.4  MG/DL Final    CHOL/HDL Ratio 11/08/2021 2.2  0.0 - 5.0   Final    CK 11/08/2021 123  39 - 308 U/L Final    WBC 11/08/2021 7.0  4.1 - 11.1 K/uL Final    RBC 11/08/2021 5.09  4. 10 - 5.70 M/uL Final    HGB 11/08/2021 15.0  12.1 - 17.0 g/dL Final    HCT 11/08/2021 47.7  36.6 - 50.3 % Final    MCV 11/08/2021 93.7  80.0 - 99.0 FL Final    MCH 11/08/2021 29.5  26.0 - 34.0 PG Final    MCHC 11/08/2021 31.4  30.0 - 36.5 g/dL Final    RDW 11/08/2021 15.2* 11.5 - 14.5 % Final    PLATELET 73/49/1190 334  150 - 400 K/uL Final    MPV 11/08/2021 10.9  8.9 - 12.9 FL Final    NRBC 11/08/2021 0.0  0  WBC Final    ABSOLUTE NRBC 11/08/2021 0.00  0.00 - 0.01 K/uL Final    NEUTROPHILS 11/08/2021 69  32 - 75 % Final    LYMPHOCYTES 11/08/2021 18  12 - 49 % Final    MONOCYTES 11/08/2021 9  5 - 13 % Final    EOSINOPHILS 11/08/2021 2  0 - 7 % Final    BASOPHILS 11/08/2021 1  0 - 1 % Final    IMMATURE GRANULOCYTES 11/08/2021 1* 0.0 - 0.5 % Final    ABS. NEUTROPHILS 11/08/2021 5.0  1.8 - 8.0 K/UL Final    ABS. LYMPHOCYTES 11/08/2021 1.2  0.8 - 3.5 K/UL Final    ABS. MONOCYTES 11/08/2021 0.6  0.0 - 1.0 K/UL Final    ABS. EOSINOPHILS 11/08/2021 0.1  0.0 - 0.4 K/UL Final    ABS. BASOPHILS 11/08/2021 0.1  0.0 - 0.1 K/UL Final    ABS. IMM. GRANS. 11/08/2021 0.0  0.00 - 0.04 K/UL Final    DF 11/08/2021 AUTOMATED    Final         Radiographic Studies:  XR Results (most recent):  Results from [place] encounter on 05/02/22    XR CHEST PA LAT    Narrative  Clinical indication: Renal cell carcinoma. Frontal and lateral views of the chest obtained, comparison September 20, 2021. The a heart size is normal, mild ectasia of the thoracic aorta is unchanged. There is no acute infiltrate. Slight hyperinflation. Impression  No change no acute findings. MOODY Results (most recent):  No results found for this or any previous visit. CT Results (most recent):  Results from Hospital Encounter encounter on 12/31/12    CTA ABD AO ILIACS W R/O    Narrative  **Final Report**      ICD Codes / Adm. Diagnosis: 289.81   / Primary hypercoagulable state  Examination:  CT ABD AORTA ILIACS ANGIOGRAPHY  - [id number] - Dec 31 2012  12:41PM  Accession No:  [id number]  Reason:  Primary hypercoagulabe state      REPORT:  CT angiography of the pelvis and bilateral legs was performed using 100 cc  [id number]. Three-dimensional postprocessing was performed. Findings: The distal abdominal aorta is slightly calcified but not short  moderate or aneurysmal.  The aortic bifurcation is patent. Common iliac  arteries are patent bilaterally. External iliac arteries are patent  bilaterally. Common femoral arteries and superficial femoral arteries are patent  bilaterally with only minimal calcification. The popliteal arteries are patent bilaterally. There is no evidence for  popliteal artery aneurysm is questioned. There is expansion and probable thrombus within the right popliteal vein. There is three-vessel runoff bilaterally. IMPRESSION:  1. No evidence for popliteal artery aneurysm as questioned. 2.  There is expansion of the right popliteal vein which probably contains  thrombus. Patient was documented to have a right popliteal vein DVT in May  2010. The acuity of the finding on the current examination is unknown. The findings were called to Dr. Corey Jo on today at 2 PM by myself. [address]            Signing/Reading Doctor: Queta Bender ([id number])  Approved: Queta Bender ([id number])  Dec 31 2012  2:04PM     DEXA Results (most recent):  Results from Hospital Encounter encounter on 12/01/21    DEXA BONE DENSITY STUDY AXIAL    Narrative  Bone Mineral Density    Indication: Osteopenia  Age: 80  Sex: Male. Number of falls in the past year: 1. Risk factors for osteoporosis: None. .    Current medication for osteoporosis: Calcium and vitamin D.    Comparison: 2019. Technique: Imaging was performed on the [employer]  meta-analysis fracture risk calculator (FRAX) analysis was performed for 10 year  fracture risk probability assessment. Excluded sites: Lumbar spine for degenerative changes. Findings:    Femoral Neck Left: . Bone mineral density (gm/cm2): 0.773.  % of peak bone mass: May 2.  % for age matched controls: 80.  T-score: -2.3.  Z-score: -0.6. Femoral Neck Right: . Bone mineral density (gm/cm2): 0.775.  % of peak bone mass: 71.  % for age matched controls: 80.  T-score: -2.4.  Z-score: -0.5. Total Hip Left: . Bone mineral density (gm/cm2): 0.82.  % of peak bone mass: 80.  % for age matched controls: 80.  T-score: -1.5.  Z-score: -0.3. Total Hip Right: . Bone mineral density (gm/cm2): 0.890.  % of peak bone mass: 81.  % for age matched controls: 80.  T-score: -1.5.  Z-score: -0.2. Impression  Impression: This patient is osteopenic using the World Health Organization criteria  As compared to the prior study, there has been no interval change. 10 year probability of major osteoporotic fracture: 13.5%. 10 year probability of hip fracture: 5.6%. Recommendations:  Therapy recommendations need to be tailored to each individual patient. Using  the [facility]) FRAX absolute fracture algorithm, the  [address] [place] [address] recommends beginning pharmacological therapy in  postmenopausal women and men over the age of 48 with a 8 year probability of a  hip fracture of >3% OR with the 10 year probability of a major osteoporotic  fracture of >20%. Please reconsider testing based on risk factors. Currently, Medicare will only  reimburse for a central DXA examination every two years, unless the patient is  on chronic glucocorticoid therapy.     Note: Please note that reliable, valid comparisons cannot be made between  studies which have been performed on machines from different manufacturers. If  clinically warranted, a follow up study performed at this site, on the same  unit, would allow the most sensitive assessment of change in bone mineral  density. MRI Results (most recent):  Results from [place] [facility] encounter on 02/23/12    MRI SHOULDER LEFT WITHOUT CONTRAST    Narrative  **Final Report**      ICD Codes / Adm. Diagnosis: 719.41   / PAIN IN JOINT, SHOULDER REGION  Examination:  MR SHOULDER WO CON LT  - [id number] - Feb 23 2012  3:40PM  Accession No:  [id number]  Reason:  shoulder pain      REPORT:  INDICATION: shoulder pain left    COMPARISON: None    EXAM: Oblique coronal T1-weighted spin-echo, axial fat suppressed proton  density weighted fast spin echo, oblique coronal fat-suppressed proton  density and T2-weighted fast spin-echo, and oblique sagittal fat-suppressed  T2-weighted fast spin-echo MR images of the left shoulder are obtained. FINDINGS: There is mild osteoarthritis of the acromioclavicular joint. There  is a type III acromion as well as lateral downsloping of the acromion. There  is diffuse supraspinatus and infraspinatus tendinopathy although no full  thickness or substantial partial thickness tendon tear is shown. There is  mild subcortical degenerative osseous signal change in the posterior  superolateral aspect of the humeral head. There is also a mild impaction  defect of the posterior superolateral humeral head consistent with a  Hill-Sachs lesion. There is no substantial joint or bursal effusion. The  long head biceps tendon is located. There is no obvious labral derangement. Mild glenohumeral joint osteoarthritis is shown with intermediate grade  central humeral head and posterior glenoid chondral derangement. No bone or  soft tissue mass or evidence for muscle atrophy or edema is shown. IMPRESSION:  1. No evidence for full thickness rotator cuff tendon tear. 2. Chronic mild Hill-Sachs lesion. Signing/Reading Doctor: Sterling Oviedo [employer]lett [employer] ([id number])  Approved: AKILAH Pantoja Plants ([id number])  02/23/2012       Assessment/Plan:       ICD-10-CM ICD-9-CM    1. Essential hypertension  I10 401.9 amLODIPine (NORVASC) 2.5 mg tablet          Essential Hypertension/Blood Pressure Management:   - Home BP Readings: usual 160/90   - Current Control: stage 1   - Target BP: <140/90 mmhg   - Relevant BP Meds:  Key CAD CHF Meds             amLODIPine (NORVASC) 2.5 mg tablet (Taking) Take 1 Tablet by mouth daily for 360 days. HOLD for Systolic Blood Pressure < 130 mmHg    rivaroxaban (Xarelto) 20 mg tab tablet (Taking) Take 1 Tablet by mouth daily for 360 days. pravastatin (PRAVACHOL) 40 mg tablet (Taking) Take 1 Tablet by mouth daily.           - Plan: med changes per orders, dietary sodium restriction, regular aerobic exercise, weight loss   - Notes: I suspect that his uncontrolled hypertension likely relates to recent dietary indiscretions, from excessively salty foods being provided to him by his [employer] friends. We discussed this at length today. The patient is advised to begin restricting his sodium intake, and information on the [employer] is provided. In addition, we discussed starting low-dose amlodipine with hold parameters. I suspect that as he eliminate sodium from his diet, his blood pressure will eventually normalize. Amlodipine 2.5 mg daily with hold parameters less than 301 mmHg systolic are provided. We discussed the risks and benefits of the medication to include a side effect of lower extremity edema. Discontinuation and return precautions were also discussed. Patient endorses agreement and understanding.     Hyperlipidemia/Dyslipidemia:   - Summary of Cardiovascular Risks and Goals:     LDL goal is under 100  hypertension  hyperlipidemia   -   Lab Results   Component Value Date/Time    LDL, calculated 72.6 11/08/2021 10:27 AM    HDL Cholesterol 78 11/08/2021 10:27 AM       - Relevant Cholesterol Meds:  Key Antihyperlipidemia Meds pravastatin (PRAVACHOL) 40 mg tablet (Taking) Take 1 Tablet by mouth daily. ---     - Notes: Tolerating therapy, CCM    History of DVT/Factor V Leiden:   - No bruising/bleeding with Xarelto. [facility]. Follow-up and Dispositions    · Return in about 3 months (around 9/3/2022). Lorraine Ewing MD    Please note that this dictation was completed with [employer], the computer voice recognition software. Quite often unanticipated grammatical, syntax, homophones, and other interpretive errors are inadvertently transcribed by the computer software. Please disregard these errors. Please excuse any errors that have escaped final proofreading.

## 2022-06-17 RX ORDER — CALCIUM CARBONATE/VITAMIN D3 600 MG-125
1 TABLET ORAL DAILY
Qty: 90 TABLET | Refills: 3 | Status: SHIPPED | OUTPATIENT
Start: 2022-06-17 | End: 2023-06-12

## 2022-06-17 NOTE — TELEPHONE ENCOUNTER
PCP: Riki Bravo MD    Last appt: 6/3/2022  Future Appointments   Date Time Provider Cici Guzman   9/8/2022  4:00 PM Riki Bravo MD [facility] AMB   11/18/2022 11:00 AM Riki Bravo MD [facility] AMB       Requested Prescriptions     Pending Prescriptions Disp Refills    calcium-cholecalciferol, d3, (CALCIUM 600 + D) 600-125 mg-unit tab       Sig: Take 1 Tablet by mouth daily.        Prior labs and Blood pressures:  BP Readings from Last 3 Encounters:   06/03/22 (!) 176/98   05/11/22 (!) 150/88   12/22/21 116/74     Lab Results   Component Value Date/Time    Sodium 141 05/11/2022 10:10 AM    Potassium 4.0 05/11/2022 10:10 AM    Chloride 108 05/11/2022 10:10 AM    CO2 27 05/11/2022 10:10 AM    Anion gap 6 05/11/2022 10:10 AM    Glucose 93 05/11/2022 10:10 AM    BUN 21 (H) 05/11/2022 10:10 AM    Creatinine 1.07 05/11/2022 10:10 AM    BUN/Creatinine ratio 20 05/11/2022 10:10 AM    GFR est AA >60 05/11/2022 10:10 AM    GFR est non-AA >60 05/11/2022 10:10 AM    Calcium 9.7 05/11/2022 10:10 AM

## 2022-06-17 NOTE — TELEPHONE ENCOUNTER
PCP: Kayleihg Lobo MD    Last appt: 6/3/2022  Future Appointments   Date Time Provider Cici Guzman   9/8/2022  4:00 PM MD [facility] Gaines   11/18/2022 11:00 AM MD [facility] Gaines AMB       Requested Prescriptions     Pending Prescriptions Disp Refills    calcium-cholecalciferol, d3, (CALCIUM 600 + D) 600-125 mg-unit tab       Sig: Take 1 Tablet by mouth daily.          Other Comments:

## 2022-07-25 ENCOUNTER — TELEPHONE (OUTPATIENT)
Dept: INTERNAL MEDICINE CLINIC | Age: 82
End: 2022-07-25

## 2022-07-25 RX ORDER — PRAVASTATIN SODIUM 40 MG/1
TABLET ORAL
Qty: 90 TABLET | Refills: 0 | Status: SHIPPED | OUTPATIENT
Start: 2022-07-25 | End: 2022-10-25

## 2022-07-25 NOTE — TELEPHONE ENCOUNTER
Patient stated that he takes Vit-D 2 once per week and the pharmacy states it is not longer available. Would like to know if there is another option that Dr. Adam Ramirez could call into his pharmacy?

## 2022-09-08 ENCOUNTER — OFFICE VISIT (OUTPATIENT)
Dept: INTERNAL MEDICINE CLINIC | Age: 82
End: 2022-09-08
Payer: MEDICARE

## 2022-09-08 VITALS
HEART RATE: 73 BPM | TEMPERATURE: 98.6 F | BODY MASS INDEX: 22.25 KG/M2 | DIASTOLIC BLOOD PRESSURE: 84 MMHG | RESPIRATION RATE: 14 BRPM | HEIGHT: 73 IN | SYSTOLIC BLOOD PRESSURE: 132 MMHG | OXYGEN SATURATION: 97 % | WEIGHT: 167.9 LBS

## 2022-09-08 DIAGNOSIS — E78.00 HYPERCHOLESTEROLEMIA: ICD-10-CM

## 2022-09-08 DIAGNOSIS — I82.409 RECURRENT DEEP VEIN THROMBOSIS (DVT) (HCC): ICD-10-CM

## 2022-09-08 DIAGNOSIS — I10 ESSENTIAL HYPERTENSION: Primary | ICD-10-CM

## 2022-09-08 PROCEDURE — 1123F ACP DISCUSS/DSCN MKR DOCD: CPT | Performed by: INTERNAL MEDICINE

## 2022-09-08 PROCEDURE — 99214 OFFICE O/P EST MOD 30 MIN: CPT | Performed by: INTERNAL MEDICINE

## 2022-09-08 RX ORDER — AMLODIPINE BESYLATE 2.5 MG/1
2.5 TABLET ORAL DAILY
Qty: 90 TABLET | Refills: 3 | Status: SHIPPED | OUTPATIENT
Start: 2022-09-08 | End: 2023-09-03

## 2022-09-08 NOTE — PROGRESS NOTES
Chief Complaint   Patient presents with    Follow-up     Visit Vitals  /84 (BP 1 Location: Left upper arm, BP Patient Position: Sitting, BP Cuff Size: Adult)   Pulse 73   Temp 98.6 °F (37 °C) (Oral)   Resp 14   Ht 6' 1\" (1.854 m)   Wt 167 lb 14.4 oz (76.2 kg)   SpO2 97%   BMI 22.15 kg/m²       1. \"Have you been to the ER, urgent care clinic since your last visit? Hospitalized since your last visit? \" No    2. \"Have you seen or consulted any other health care providers outside of the [address] [place] [address] since your last visit? \" No     3. For patients aged 39-70: Has the patient had a colonoscopy / FIT/ Cologuard? No      If the patient is female:    4. For patients aged 41-77: Has the patient had a mammogram within the past 2 years? No      5. For patients aged 21-65: Has the patient had a pap smear?  No

## 2022-09-08 NOTE — PATIENT INSTRUCTIONS
Amlodipine (By mouth)   Amlodipine ([id number]-um-peen)  Treats high blood pressure and angina (chest pain). This medicine is a calcium channel blocker. Brand Name(s): Norvasc   There may be other brand names for this medicine. When This Medicine Should Not Be Used: This medicine is not right for everyone. Do not use it if you had an allergic reaction to amlodipine. How to Use This Medicine:   Tablet, Dissolving Tablet  Take your medicine as directed. Your dose may need to be changed several times to find what works best for you. Take this medicine at the same time each day. Read and follow the patient instructions that come with this medicine. Talk to your doctor or pharmacist if you have any questions. Missed dose: Take a dose as soon as you remember. If it has been more than 12 hours since you were supposed to take your dose, skip the missed dose and take your next regular dose at the regular time. Store the medicine in a closed container at room temperature, away from heat, moisture, and direct light. Drugs and Foods to Avoid:   Ask your doctor or pharmacist before using any other medicine, including over-the-counter medicines, vitamins, and herbal products. Some medicines can affect how amlodipine works. Tell your doctor if you are also using any of the following:   Clarithromycin, cyclosporine, diltiazem, itraconazole, ritonavir, sildenafil, simvastatin, tacrolimus  Warnings While Using This Medicine:   Tell your doctor if you are pregnant or breastfeeding, or if you have liver disease, heart disease, coronary artery disease, or aortic stenosis. This medicine could lower your blood pressure too much, especially when you first use it or if you are dehydrated. Stand or sit up slowly if you feel lightheaded or dizzy. Your doctor will check your progress and the effects of this medicine at regular visits. Keep all appointments.   Do not stop using this medicine without asking your doctor, even if you feel well. This medicine will not cure high blood pressure, but it will help keep it in normal range. You may have to take blood pressure medicine for the rest of your life. Keep all medicine out of the reach of children. Never share your medicine with anyone. Possible Side Effects While Using This Medicine:   Call your doctor right away if you notice any of these side effects: Allergic reaction: Itching or hives, swelling in your face or hands, swelling or tingling in your mouth or throat, chest tightness, trouble breathing  Lightheadedness, dizziness  New or worsening chest pain  Swelling in your hands, ankles, or legs  Trouble breathing, nausea, unusual sweating, fainting  If you notice other side effects that you think are caused by this medicine, tell your doctor. Call your doctor for medical advice about side effects. You may report side effects to FDA at 0-002-VTX-8579  © 2017 [facility] Information is for End User's use only and may not be sold, redistributed or otherwise used for commercial purposes. The above information is an  only. It is not intended as medical advice for individual conditions or treatments. Talk to your doctor, nurse or pharmacist before following any medical regimen to see if it is safe and effective for you. DASH Diet: Care Instructions  Your Care Instructions     The DASH diet is an eating plan that can help lower your blood pressure. DASH stands for Dietary Approaches to Stop Hypertension. Hypertension is high blood pressure. The DASH diet focuses on eating foods that are high in calcium, potassium, and magnesium. These nutrients can lower blood pressure. The foods that are highest in these nutrients are fruits, vegetables, low-fat dairy products, nuts, seeds, and legumes. But taking calcium, potassium, and magnesium supplements instead of eating foods that are high in those nutrients does not have the same effect.  The DASH diet also includes whole grains, fish, and poultry. The DASH diet is one of several lifestyle changes your doctor may recommend to lower your high blood pressure. Your doctor may also want you to decrease the amount of sodium in your diet. Lowering sodium while following the DASH diet can lower blood pressure even further than just the DASH diet alone. Follow-up care is a key part of your treatment and safety. Be sure to make and go to all appointments, and call your doctor if you are having problems. It's also a good idea to know your test results and keep a list of the medicines you take. How can you care for yourself at home? Following the DASH diet  Eat 4 to 5 servings of fruit each day. A serving is 1 medium-sized piece of fruit, ½ cup chopped or canned fruit, 1/4 cup dried fruit, or 4 ounces (½ cup) of fruit juice. Choose fruit more often than fruit juice. Eat 4 to 5 servings of vegetables each day. A serving is 1 cup of lettuce or raw leafy vegetables, ½ cup of chopped or cooked vegetables, or 4 ounces (½ cup) of vegetable juice. Choose vegetables more often than vegetable juice. Get 2 to 3 servings of low-fat and fat-free dairy each day. A serving is 8 ounces of milk, 1 cup of yogurt, or 1 ½ ounces of cheese. Eat 6 to 8 servings of grains each day. A serving is 1 slice of bread, 1 ounce of dry cereal, or ½ cup of cooked rice, pasta, or cooked cereal. Try to choose whole-grain products as much as possible. Limit lean meat, poultry, and fish to 2 servings each day. A serving is 3 ounces, about the size of a deck of cards. Eat 4 to 5 servings of nuts, seeds, and legumes (cooked dried beans, lentils, and split peas) each week. A serving is 1/3 cup of nuts, 2 tablespoons of seeds, or ½ cup of cooked beans or peas. Limit fats and oils to 2 to 3 servings each day. A serving is 1 teaspoon of vegetable oil or 2 tablespoons of salad dressing. Limit sweets and added sugars to 5 servings or less a week.  A serving is 1 tablespoon jelly or jam, ½ cup sorbet, or 1 cup of lemonade. Eat less than 2,300 milligrams (mg) of sodium a day. If you limit your sodium to 1,500 mg a day, you can lower your blood pressure even more. Be aware that all of these are the suggested number of servings for people who eat 1,800 to 2,000 calories a day. Your recommended number of servings may be different if you need more or fewer calories. Tips for success  Start small. Do not try to make dramatic changes to your diet all at once. You might feel that you are missing out on your favorite foods and then be more likely to not follow the plan. Make small changes, and stick with them. Once those changes become habit, add a few more changes. Try some of the following:  Make it a goal to eat a fruit or vegetable at every meal and at snacks. This will make it easy to get the recommended amount of fruits and vegetables each day. Try yogurt topped with fruit and nuts for a snack or healthy dessert. Add lettuce, tomato, cucumber, and onion to sandwiches. Combine a ready-made pizza crust with low-fat mozzarella cheese and lots of vegetable toppings. Try using tomatoes, squash, spinach, broccoli, carrots, cauliflower, and onions. Have a variety of cut-up vegetables with a low-fat dip as an appetizer instead of chips and dip. Sprinkle sunflower seeds or chopped almonds over salads. Or try adding chopped walnuts or almonds to cooked vegetables. Try some vegetarian meals using beans and peas. Add garbanzo or kidney beans to salads. Make burritos and tacos with mashed seymour beans or black beans. Where can you learn more? Go to http://www.[employer].com/  Enter H967 in the search box to learn more about \"DASH Diet: Care Instructions. \"  Current as of: January 10, 2022               Content Version: 13.2  © 7660-5822 [employer].    Care instructions adapted under license by [employer] (which disclaims liability or warranty for this information). If you have questions about a medical condition or this instruction, always ask your healthcare professional. [place] [address] any warranty or liability for your use of this information.

## 2022-09-08 NOTE — PROGRESS NOTES
ICD-10-CM ICD-9-CM    1. Essential hypertension  I10 401.9 amLODIPine (NORVASC) 2.5 mg tablet      2. Recurrent deep vein thrombosis (DVT) ([facility])  I82.409 453.40       3. Hypercholesterolemia  E78.00 272.0                Subjective:     Chief Complaint   Patient presents with    Follow-up       Pushpa Veronica is a 80 y.o. M. He has a past medical history of prior DVT, prostate cancer, renal cell carcinoma, and hypercholesterolemia, among other problems. I reviewed and updated the medical record. This patient was seen most recently in early June to discuss his hypertension. He had previously been to his orthopedist to obtain injections on his knee for osteoarthritis, which had subsequently raised his blood pressure readings to the 602 mmHg systolic range. He was without other symptoms at the time. Physical examination at the time was mainly notable for hypertension with a blood pressure at the time of 176/98 mmHg. For his hypertension, I had started the patient on amlodipine 2.5 mg daily, with hold parameters, as it was felt that likely his blood pressure readings were being elevated secondary to his corticosteroid injections. He was advised to follow-up in a few months time to reevaluate his control. Today, the patient comes in mainly to follow-up on his hypertension and blood pressure control. Since his last visit, he has been taking the amlodipine only intermittently, but admits that he had not been measuring his blood pressure readings routinely. During this time, he was otherwise asymptomatic. However, over the past week, he had gone to [facility], where he had asked the nurse to check his blood pressure. There, he had found that it was elevated to the 180/90 mmHg range based upon the record that the patient provides for me to review. He had confirmed this with his wife's blood pressure meter, and so as a consequence he had started the amlodipine 2.5 mg that I had prescribed before.   With this, he has been taking this every night, and his blood pressure readings since that time have been much improved typically to the 130 to 911 mmHg systolic range. He denies having had any chest pain, shortness of breath, or any further cardiopulmonary concerns. In addition, he denies having had any lower extremity edema, orthopnea, or paroxysmal nocturnal dyspnea. He denies any other side effect such as lightheadedness or dizziness with the use of the amlodipine. He continues on pravastatin for his history of hypercholesterolemia, as before. He denies having had any myalgias or GI symptoms with this medication. In addition, he continues on Xarelto, lifelong, for anticoagulation given his prior history of recurrent DVT. He denies having had any excess bleeding or bruising with this medication. He otherwise reports feeling well, without any further concerns elicited today. His review of systems is otherwise negative. Routine Healthcare Maintenance issues are reviewed and discussed with the patient as noted below. Orders to update gaps in healthcare maintenance were placed as noted below in the Assessment and Plan, where applicable.      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    Factor V Leiden mutation ([facility] [address] 10/03/2017    Gastroesophageal reflux disease without esophagitis 10/03/2017    Herpes zoster virus infection of face and ear nerves 10/03/2017    History of allergic rhinitis 10/03/2017    purulent    History of colonic polyps 10/03/2017    History of DVT (deep vein thrombosis)     DVT    History of prostate cancer     Prostate    History of prostate cancer     History of renal cell carcinoma 10/03/2017    History of toe fracture 10/03/2017    left sided    Hypercholesterolemia 10/03/2017    Lumbago 10/03/2017    Osteopenia 10/03/2017    FRAX 2021 = 13/5.6%    Other ill-defined conditions(799.89)     doctor does not want him to have quinolones family -causes achilles tendonitis    Recurrent deep vein thrombosis (DVT) ([facility] [address] 10/03/2017    Vitamin D deficiency 10/03/2017       Past Surgical Histor:  Past Surgical History:   Procedure Laterality Date    COLORECTAL SCRN; HI RISK IND  2/3/2015         HX ORTHOPAEDIC      cortisone shot in knee and elbow    HX OTHER SURGICAL      colonoscopy    HX TONSILLECTOMY      HX UROLOGICAL Right     Kidney Removal       Allergies: Allergies   Allergen Reactions    Quinolones Unknown (comments)       Medications:  Current Outpatient Medications   Medication Sig Dispense Refill    amLODIPine (NORVASC) 2.5 mg tablet Take 1 Tablet by mouth daily for 360 days. HOLD for Systolic Blood Pressure less than 110 mmHg  Indications: high blood pressure 90 Tablet 3    pravastatin (PRAVACHOL) 40 mg tablet TAKE ONE TABLET BY MOUTH DAILY 90 Tablet 0    calcium-cholecalciferol, d3, (CALCIUM 600 + D) 600-125 mg-unit tab Take 1 Tablet by mouth daily for 360 days. 90 Tablet 3    rivaroxaban (Xarelto) 20 mg tab tablet Take 1 Tablet by mouth daily for 360 days. 90 Tablet 3    cyanocobalamin (VITAMIN B12) 500 mcg tablet Take 500 mcg by mouth daily. acetaminophen (TYLENOL) 500 mg tablet Take 1,000 mg by mouth every six (6) hours as needed for Pain. diphenhydrAMINE (BENADRYL) 25 mg capsule Take 25 mg by mouth every six (6) hours as needed for Itching or Sleep (allergies).          Social History:  Social History     Socioeconomic History    Marital status:    Tobacco Use    Smoking status: Never    Smokeless tobacco: Never   Vaping Use    Vaping Use: Never used   Substance and Sexual Activity    Alcohol use: No    Drug use: No       Family History:  Family History   Problem Relation Age of Onset    Hypertension Mother     Other Mother         abdominal aneurysm Cancer Father         bladder        Immunizations:  Immunization History   Administered Date(s) Administered    COVID-19, PFIZER PURPLE top, DILUTE for use, (age 15 y+), IM, 30mcg/0.3mL 01/28/2021, 02/25/2021, 11/02/2021    Influenza High Dose Vaccine PF 09/22/2017    Influenza Vaccine 09/25/2014, 10/19/2015, 10/26/2016    Influenza Vaccine (Tri) Adjuvanted (>65 Yrs FLUAD TRI 99104) 09/21/2018, 11/07/2019    Influenza, FLUAD, (age 72 y+), Adjuvanted 10/12/2020, 10/22/2021    Pneumococcal Conjugate (PCV-13) 10/19/2015    Pneumococcal Polysaccharide (PPSV-23) 12/01/2005    Tdap 08/02/2018    Zoster Recombinant 01/20/2019, 05/28/2019        Healthcare Maintenance:  Health Maintenance   Topic Date Due    COVID-19 Vaccine (4 - Booster for Pfizer series) 03/02/2022    Flu Vaccine (1) 09/01/2022    Lipid Screen  11/08/2022    Medicare Yearly Exam  05/12/2023    Depression Screen  06/03/2023    DTaP/Tdap/Td series (2 - Td or Tdap) 08/02/2028    Shingrix Vaccine Age 50>  Completed    Pneumococcal 65+ years  Completed        Review of Systems:  ROS:  Review of Systems   Constitutional: Negative. HENT: Negative. Eyes: Negative. Respiratory: Negative. Cardiovascular: Negative. Gastrointestinal: Negative. Genitourinary: Negative. Musculoskeletal: Negative. Skin: Negative. Neurological: Negative. Endo/Heme/Allergies: Negative. Psychiatric/Behavioral: Negative. ROS otherwise negative      Objective:     Vital Signs:  Visit Vitals  /84 (BP 1 Location: Left upper arm, BP Patient Position: Sitting, BP Cuff Size: Adult)   Pulse 73   Temp 98.6 °F (37 °C) (Oral)   Resp 14   Ht 6' 1\" (1.854 m)   Wt 167 lb 14.4 oz (76.2 kg)   SpO2 97%   BMI 22.15 kg/m²       BMI:  Body mass index is 22.15 kg/m². Physical Examination:  Physical Exam  Vitals reviewed. Constitutional:       Appearance: Normal appearance. HENT:      Head: Normocephalic and atraumatic.       Nose: Nose normal. Mouth/Throat:      Mouth: Mucous membranes are moist.   Eyes:      Extraocular Movements: Extraocular movements intact. Conjunctiva/sclera: Conjunctivae normal.      Pupils: Pupils are equal, round, and reactive to light. Cardiovascular:      Rate and Rhythm: Normal rate and regular rhythm. Pulses: Normal pulses. Heart sounds: Normal heart sounds. No murmur heard. No friction rub. No gallop. Pulmonary:      Effort: Pulmonary effort is normal. No respiratory distress. Breath sounds: Normal breath sounds. No wheezing, rhonchi or rales. Abdominal:      General: Bowel sounds are normal. There is no distension. Palpations: Abdomen is soft. There is no mass. Tenderness: There is no abdominal tenderness. There is no guarding or rebound. Musculoskeletal:         General: No tenderness, deformity or signs of injury. Normal range of motion. Cervical back: Normal range of motion and neck supple. Right lower leg: No edema. Left lower leg: No edema. Skin:     General: Skin is warm and dry. Findings: No bruising, lesion or rash. Neurological:      General: No focal deficit present. Mental Status: He is alert and oriented to person, place, and time. Mental status is at baseline. Cranial Nerves: No cranial nerve deficit. Sensory: No sensory deficit. Motor: No weakness.       Coordination: Coordination normal.      Gait: Gait normal.      Deep Tendon Reflexes: Reflexes normal.   Psychiatric:         Mood and Affect: Mood normal.         Behavior: Behavior normal.        Physical exam otherwise negative    Diagnostic Testing:    Laboratory Studies:  Office Visit on 05/11/2022   Component Date Value Ref Range Status    Vitamin D 25-Hydroxy 05/11/2022 108.7 (A) 30 - 100 ng/mL Final    Comment: (NOTE)  Deficiency               <20 ng/mL  Insufficiency          20-30 ng/mL  Sufficient             ng/mL  Possible toxicity       >100 ng/mL    The Method used is [facility] currently standardized to a   Center of Disease Control and Prevention (CDC) certified reference   [address]. Samples containing fluorescein dye can produce falsely   elevated values when tested with the [employer] Centaur Vitamin D Assay. It is recommended that results in the toxic range, >100 ng/mL, be   retested 72 hours post fluorescein exposure. Sodium 05/11/2022 141  136 - 145 mmol/L Final    Potassium 05/11/2022 4.0  3.5 - 5.1 mmol/L Final    Chloride 05/11/2022 108  97 - 108 mmol/L Final    CO2 05/11/2022 27  21 - 32 mmol/L Final    Anion gap 05/11/2022 6  5 - 15 mmol/L Final    Glucose 05/11/2022 93  65 - 100 mg/dL Final    BUN 05/11/2022 21 (A) 6 - 20 MG/DL Final    Creatinine 05/11/2022 1.07  0.70 - 1.30 MG/DL Final    BUN/Creatinine ratio 05/11/2022 20  12 - 20   Final    GFR est AA 05/11/2022 >60  >60 ml/min/1.73m2 Final    GFR est non-AA 05/11/2022 >60  >60 ml/min/1.73m2 Final    Comment: Estimated GFR is calculated using the IDMS-traceable Modification of Diet in  Renal Disease (MDRD) Study equation, reported for both  Americans  (GFRAA) and non- Americans (GFRNA), and normalized to 1.73m2 body  surface area. The physician must decide which value applies to the patient. Calcium 05/11/2022 9.7  8.5 - 10.1 MG/DL Final         Radiographic Studies:  XR Results (most recent):  Results from Hospital Encounter encounter on 05/02/22    XR CHEST PA LAT    Narrative  Clinical indication: Renal cell carcinoma. Frontal and lateral views of the chest obtained, comparison September 20, 2021. The a heart size is normal, mild ectasia of the thoracic aorta is unchanged. There is no acute infiltrate. Slight hyperinflation. Impression  No change no acute findings. MOODY Results (most recent):  No results found for this or any previous visit.      CT Results (most recent):  Results from [place] encounter on 12/31/12    CTA ABD AO ILIACS W R/O    Narrative  **Final Report**      ICD Codes / Adm. Diagnosis: 289.81   / Primary hypercoagulable state  Examination:  CT ABD AORTA ILIACS ANGIOGRAPHY  - [id number] - Dec 31 2012  12:41PM  Accession No:  [id number]  Reason:  Primary hypercoagulabe state      REPORT:  CT angiography of the pelvis and bilateral legs was performed using 100 cc  [id number]-350. Three-dimensional postprocessing was performed. Findings: The distal abdominal aorta is slightly calcified but not short  moderate or aneurysmal.  The aortic bifurcation is patent. Common iliac  arteries are patent bilaterally. External iliac arteries are patent  bilaterally. Common femoral arteries and superficial femoral arteries are patent  bilaterally with only minimal calcification. The popliteal arteries are patent bilaterally. There is no evidence for  popliteal artery aneurysm is questioned. There is expansion and probable thrombus within the right popliteal vein. There is three-vessel runoff bilaterally. IMPRESSION:  1. No evidence for popliteal artery aneurysm as questioned. 2.  There is expansion of the right popliteal vein which probably contains  thrombus. Patient was documented to have a right popliteal vein DVT in May  2010. The acuity of the finding on the current examination is unknown. The findings were called to Dr. Danette Tran on today at 2 PM by myself. [address]            Signing/Reading Doctor: Cal Bowman ([id number])  Approved: Cal Bowman ([id number])  Dec 31 2012  2:04PM     DEXA Results (most recent):  Results from Hospital Encounter encounter on 12/01/21    DEXA BONE DENSITY STUDY AXIAL    Narrative  Bone Mineral Density    Indication: Osteopenia  Age: 80  Sex: Male. Number of falls in the past year: 1. Risk factors for osteoporosis: None. .    Current medication for osteoporosis: Calcium and vitamin D. Comparison: 2019.     Technique: Imaging was performed on the [employer] [place] [employer]  meta-analysis fracture risk calculator (FRAX) analysis was performed for 10 year  fracture risk probability assessment. Excluded sites: Lumbar spine for degenerative changes. Findings:    Femoral Neck Left: . Bone mineral density (gm/cm2): 0.773.  % of peak bone mass: May 2.  % for age matched controls: 80.  T-score: -2.3.  Z-score: -0.6. Femoral Neck Right: . Bone mineral density (gm/cm2): 0.775.  % of peak bone mass: 71.  % for age matched controls: 80.  T-score: -2.4.  Z-score: -0.5. Total Hip Left: . Bone mineral density (gm/cm2): 0.82.  % of peak bone mass: 80.  % for age matched controls: 80.  T-score: -1.5.  Z-score: -0.3. Total Hip Right: . Bone mineral density (gm/cm2): 0.890.  % of peak bone mass: 81.  % for age matched controls: 80.  T-score: -1.5.  Z-score: -0.2. Impression  Impression: This patient is osteopenic using the World Health Organization criteria  As compared to the prior study, there has been no interval change. 10 year probability of major osteoporotic fracture: 13.5%. 10 year probability of hip fracture: 5.6%. Recommendations:  Therapy recommendations need to be tailored to each individual patient. Using  the [facility]) FRAX absolute fracture algorithm, the  [address] [place] [address] recommends beginning pharmacological therapy in  postmenopausal women and men over the age of 48 with a 8 year probability of a  hip fracture of >3% OR with the 10 year probability of a major osteoporotic  fracture of >20%. Please reconsider testing based on risk factors. Currently, Medicare will only  reimburse for a central DXA examination every two years, unless the patient is  on chronic glucocorticoid therapy. Note: Please note that reliable, valid comparisons cannot be made between  studies which have been performed on machines from different manufacturers.  If  clinically warranted, a follow up study performed at this site, on the same  unit, would allow the most sensitive assessment of change in bone mineral  density. MRI Results (most recent):  Results from [place] encounter on 02/23/12    MRI SHOULDER LEFT WITHOUT CONTRAST    Narrative  **Final Report**      ICD Codes / Adm. Diagnosis: 719.41   / PAIN IN JOINT, SHOULDER REGION  Examination:  MR SHOULDER WO CON LT  - [id number] - Feb 23 2012  3:40PM  Accession No:  [id number]  Reason:  shoulder pain      REPORT:  INDICATION: shoulder pain left    COMPARISON: None    EXAM: Oblique coronal T1-weighted spin-echo, axial fat suppressed proton  density weighted fast spin echo, oblique coronal fat-suppressed proton  density and T2-weighted fast spin-echo, and oblique sagittal fat-suppressed  T2-weighted fast spin-echo MR images of the left shoulder are obtained. FINDINGS: There is mild osteoarthritis of the acromioclavicular joint. There  is a type III acromion as well as lateral downsloping of the acromion. There  is diffuse supraspinatus and infraspinatus tendinopathy although no full  thickness or substantial partial thickness tendon tear is shown. There is  mild subcortical degenerative osseous signal change in the posterior  superolateral aspect of the humeral head. There is also a mild impaction  defect of the posterior superolateral humeral head consistent with a  Hill-Sachs lesion. There is no substantial joint or bursal effusion. The  long head biceps tendon is located. There is no obvious labral derangement. Mild glenohumeral joint osteoarthritis is shown with intermediate grade  central humeral head and posterior glenoid chondral derangement. No bone or  soft tissue mass or evidence for muscle atrophy or edema is shown. IMPRESSION:  1. No evidence for full thickness rotator cuff tendon tear. 2. Chronic mild Hill-Sachs lesion. Signing/Reading Doctor: Francheska Mckinley ([id number])  Approved: AKILAH Mckinley ([id number])  02/23/2012       Assessment/Plan: ICD-10-CM ICD-9-CM    1. Essential hypertension  I10 401.9 amLODIPine (NORVASC) 2.5 mg tablet      2. Recurrent deep vein thrombosis (DVT) ([facility])  I82.409 453.40       3. Hypercholesterolemia  E78.00 272.0                  Essential Hypertension/Blood Pressure Management:   - Home BP Readings: usual 140/90   - Current Control: optimal   - Target BP: <140/90 mmHg   - Relevant BP Meds:  Key CAD CHF Meds               amLODIPine (NORVASC) 2.5 mg tablet (Taking) Take 1 Tablet by mouth daily for 360 days. HOLD for Systolic Blood Pressure less than 110 mmHg  Indications: high blood pressure    pravastatin (PRAVACHOL) 40 mg tablet (Taking) TAKE ONE TABLET BY MOUTH DAILY    rivaroxaban (Xarelto) 20 mg tab tablet (Taking) Take 1 Tablet by mouth daily for 360 days.               - Plan: continue current treatment regimen, continue current meds, dietary sodium restriction, regular aerobic exercise   - Notes: Discussed with patient continuing with amlodipine, which I will change today to have hold parameters to less than 110 mmHg. He will otherwise continue monitoring at home as he is currently doing. His current degree of control of his blood pressure today appears to be optimal.    Venous Thromboembolism:   - Previous location of VTE: DVT, recurrent   - Current Anticoagulation:   Key Anti-Platelet Anticoagulant Meds               rivaroxaban (Xarelto) 20 mg tab tablet (Taking) Take 1 Tablet by mouth daily for 360 days. - Anticipated Duration of therapy:   - Excess Bleeding?  No    - Continue Anticoagulation: Yes    - Notes: CCM, tolerating therapy    Hyperlipidemia/Dyslipidemia:   - Summary of Cardiovascular Risks and Goals:     LDL goal is under 80  hypertension  hyperlipidemia   -   Lab Results   Component Value Date/Time    LDL, calculated 72.6 11/08/2021 10:27 AM    HDL Cholesterol 78 11/08/2021 10:27 AM       - Relevant Cholesterol Meds:  Key Antihyperlipidemia Meds               pravastatin (PRAVACHOL) 40 mg tablet (Taking) TAKE ONE TABLET BY MOUTH DAILY              - Cholesterol at target: yes   - Does patient meet USPSTF and ACC/AHA indications for pharmacotherapy (e.g., statin): yes   - GI symptoms with meds: NO   - Muscle aches with meds: NO   - Other Adverse effects with meds: NO   - Medication Plan: continue   - Notes: ---            I have reviewed the patient's medical history in detail and updated the computerized patient record. We had a prolonged discussion about these complex clinical issues and went over the various important aspects to consider. All questions were answered. Advised the patient to call back or return to office if symptoms do not improve, change in nature, or persist.     The patient was given an after visit summary or informed of [facility] which includes patient instructions, diagnoses, current medications, & vitals. he expressed understanding with the diagnosis and plan. Cecilia Gonzales MD    Please note that this dictation was completed with Panl, the computer voice recognition software. Quite often unanticipated grammatical, syntax, homophones, and other interpretive errors are inadvertently transcribed by the computer software. Please disregard these errors. Please excuse any errors that have escaped final proofreading.

## 2022-10-24 ENCOUNTER — TRANSCRIBE ORDER (OUTPATIENT)
Dept: REGISTRATION | Age: 82
End: 2022-10-24

## 2022-10-24 ENCOUNTER — HOSPITAL ENCOUNTER (OUTPATIENT)
Dept: GENERAL RADIOLOGY | Age: 82
Discharge: HOME | End: 2022-10-24
Payer: MEDICARE

## 2022-10-24 DIAGNOSIS — C64.9 ADENOCARCINOMA, RENAL CELL (HCC): ICD-10-CM

## 2022-10-24 DIAGNOSIS — C64.9 ADENOCARCINOMA, RENAL CELL (HCC): Primary | ICD-10-CM

## 2022-10-24 PROCEDURE — 71046 X-RAY EXAM CHEST 2 VIEWS: CPT

## 2022-11-03 ENCOUNTER — CLINICAL SUPPORT (OUTPATIENT)
Dept: INTERNAL MEDICINE CLINIC | Age: 82
End: 2022-11-03
Payer: MEDICARE

## 2022-11-03 VITALS
RESPIRATION RATE: 14 BRPM | SYSTOLIC BLOOD PRESSURE: 120 MMHG | DIASTOLIC BLOOD PRESSURE: 82 MMHG | TEMPERATURE: 98.6 F | HEIGHT: 73 IN | HEART RATE: 65 BPM | BODY MASS INDEX: 22.77 KG/M2 | WEIGHT: 171.8 LBS | OXYGEN SATURATION: 97 %

## 2022-11-03 DIAGNOSIS — Z23 NEEDS FLU SHOT: Primary | ICD-10-CM

## 2022-11-03 PROCEDURE — G0008 ADMIN INFLUENZA VIRUS VAC: HCPCS | Performed by: INTERNAL MEDICINE

## 2022-11-03 PROCEDURE — 90694 VACC AIIV4 NO PRSRV 0.5ML IM: CPT | Performed by: INTERNAL MEDICINE

## 2022-11-03 NOTE — PROGRESS NOTES
After obtaining written consent and per orders of Dr. Lizette Jain, injection of Fluad given by Curtis Gorman CMA. Order and injection/medication verified by David Elias. Patient tolerated procedure well. VIS was given to them. No reactions noted.

## 2022-11-15 NOTE — PROGRESS NOTES
ICD-10-CM ICD-9-CM    1. Routine adult health maintenance  Z00.00 V70.0       2. Hypercholesterolemia  E78.00 272.0 LIPID PANEL      3. Thromboembolus ([address]  I74.9 444.9 CBC WITH AUTOMATED DIFF      4. Essential hypertension  F33 706.6 METABOLIC PANEL, COMPREHENSIVE      5. Long term current use of anticoagulant therapy  Z79.01 V58.61                Subjective:     Chief Complaint   Patient presents with    Follow-up       Domonique Courtney is a 80 y.o. M. He has a history of DVT, prostate cancer, hypertension, and hypercholesterolemia. I reviewed and updated the medical record. This patient was seen most recently in early Sep for routine followup. He had reported intermittent adherence to amlodipine and had complained of intermittent hypertension. After taking this more consistently his BP readings were noted to have significantly improved. His physical examination was unremarkable. He was advised to continue with amlodipine with hold parameters and otherwise continue on Xarelto 20 mg/d for his history of DVT. Today, the patient comes in for routine follow-up. He reports feeling fine overall today. He recently had come down with the flu, and had been treated with Tamiflu for this, but has since completed his course of therapy and is now feeling better. He still has some diminished energy, but overall feels that he is steadily recovering. He also has a persistent cough, but says that this is improving. He denies having had any shortness of breath, production, hemoptysis, fevers, chills, or other constitutional or systemic complaints. He continues on pravastatin 40 mg nightly for his hypercholesterolemia, and denies having had any myalgias or GI symptoms with the use of this medication. No recent chest pain, or any other cardiopulmonary symptoms. His last LDL as reviewed below was excellent and at target.   He also continues on amlodipine 2.5 mg daily, and reports no lightheadedness or dizziness or any lower extremity edema with the use of this medication. No other focal neurological symptoms. He continues on rivaroxaban 20 mg daily for history of prior DVT in the context of factor V Leiden mutation. No excess bleeding or bruising sequelae. His review of systems is otherwise negative. Routine Healthcare Maintenance issues are reviewed and discussed with the patient as noted below. Orders to update gaps in healthcare maintenance were placed as noted below in the Assessment and Plan, where applicable.      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    Essential hypertension 06/2022    RX = Amlodipine 2.5 mg/d    Factor V Leiden mutation ([facility] [address] 10/03/2017    Gastroesophageal reflux disease without esophagitis 10/03/2017    Herpes zoster virus infection of face and ear nerves 10/03/2017    History of allergic rhinitis 10/03/2017    purulent    History of colonic polyps 10/03/2017    History of DVT (deep vein thrombosis)     DVT    History of prostate cancer     Prostate    History of prostate cancer     History of renal cell carcinoma 10/03/2017    History of toe fracture 10/03/2017    left sided    Hypercholesterolemia 10/03/2017    Lumbago 10/03/2017    Osteopenia 10/03/2017    FRAX 2021 = 13/5.6%    Other ill-defined conditions(799.89)     doctor does not want him to have quinolones family -causes achilles tendonitis    Recurrent deep vein thrombosis (DVT) ([facility] [address] 10/03/2017    Vitamin D deficiency 10/03/2017       Past Surgical Histor:  Past Surgical History:   Procedure Laterality Date    COLORECTAL SCRN; HI RISK IND  2/3/2015         HX ORTHOPAEDIC      cortisone shot in knee and elbow    HX OTHER SURGICAL      colonoscopy    HX TONSILLECTOMY      HX UROLOGICAL Right     Kidney Removal Allergies: Allergies   Allergen Reactions    Quinolones Unknown (comments)       Medications:  Current Outpatient Medications   Medication Sig Dispense Refill    pravastatin (PRAVACHOL) 40 mg tablet TAKE ONE TABLET BY MOUTH DAILY 90 Tablet 3    amLODIPine (NORVASC) 2.5 mg tablet Take 1 Tablet by mouth daily for 360 days. HOLD for Systolic Blood Pressure less than 110 mmHg  Indications: high blood pressure 90 Tablet 3    calcium-cholecalciferol, d3, (CALCIUM 600 + D) 600-125 mg-unit tab Take 1 Tablet by mouth daily for 360 days. 90 Tablet 3    rivaroxaban (Xarelto) 20 mg tab tablet Take 1 Tablet by mouth daily for 360 days. 90 Tablet 3    cyanocobalamin (VITAMIN B12) 500 mcg tablet Take 500 mcg by mouth daily. acetaminophen (TYLENOL) 500 mg tablet Take 1,000 mg by mouth every six (6) hours as needed for Pain. diphenhydrAMINE (BENADRYL) 25 mg capsule Take 25 mg by mouth every six (6) hours as needed for Itching or Sleep (allergies).          Social History:  Social History     Socioeconomic History    Marital status:    Tobacco Use    Smoking status: Never    Smokeless tobacco: Never   Vaping Use    Vaping Use: Never used   Substance and Sexual Activity    Alcohol use: No    Drug use: No       Family History:  Family History   Problem Relation Age of Onset    Hypertension Mother     Other Mother         abdominal aneurysm    Cancer Father         bladder        Immunizations:  Immunization History   Administered Date(s) Administered    COVID-19, PFIZER PURPLE top, DILUTE for use, (age 15 y+), IM, 30mcg/0.3mL 01/28/2021, 02/25/2021, 11/02/2021    Influenza High Dose Vaccine PF 09/22/2017    Influenza Vaccine 09/25/2014, 10/19/2015, 10/26/2016    Influenza Vaccine (Tri) Adjuvanted (>65 Yrs FLUAD TRI [id number]) 09/21/2018, 11/07/2019    Influenza, FLUAD, (age 72 y+), Adjuvanted 10/12/2020, 10/22/2021, 11/03/2022    Pneumococcal Conjugate (PCV-13) 10/19/2015    Pneumococcal Polysaccharide (PPSV-23) 12/01/2005    Tdap 08/02/2018    Zoster Recombinant 01/20/2019, 05/28/2019        Healthcare Maintenance:  Health Maintenance   Topic Date Due    COVID-19 Vaccine (4 - Booster for Pfizer series) 12/28/2021    Lipid Screen  11/08/2022    Medicare Yearly Exam  05/12/2023    Depression Screen  11/03/2023    DTaP/Tdap/Td series (2 - Td or Tdap) 08/02/2028    Shingrix Vaccine Age 50>  Completed    Flu Vaccine  Completed    Pneumococcal 65+ years  Completed        Review of Systems:  ROS:  Review of Systems   Constitutional: Negative. HENT: Negative. Eyes: Negative. Respiratory: Negative. Cardiovascular: Negative. Gastrointestinal: Negative. Genitourinary: Negative. Musculoskeletal: Negative. Skin: Negative. Neurological: Negative. Endo/Heme/Allergies: Negative. Psychiatric/Behavioral: Negative. ROS otherwise negative      Objective:     Vital Signs:  Visit Vitals  /88 (BP 1 Location: Right arm, BP Patient Position: Sitting, BP Cuff Size: Adult)   Pulse (!) 54   Resp 14   Ht 6' 1\" (1.854 m)   Wt 171 lb 9.6 oz (77.8 kg)   SpO2 98%   BMI 22.64 kg/m²       BMI:  Body mass index is 22.64 kg/m². Physical Examination:  Physical Exam  Vitals reviewed. Constitutional:       Appearance: Normal appearance. HENT:      Head: Normocephalic and atraumatic. Nose: Nose normal.      Mouth/Throat:      Mouth: Mucous membranes are moist.   Eyes:      Extraocular Movements: Extraocular movements intact. Conjunctiva/sclera: Conjunctivae normal.      Pupils: Pupils are equal, round, and reactive to light. Cardiovascular:      Rate and Rhythm: Normal rate and regular rhythm. Pulses: Normal pulses. Heart sounds: Normal heart sounds. No murmur heard. No friction rub. No gallop. Pulmonary:      Effort: Pulmonary effort is normal. No respiratory distress. Breath sounds: Normal breath sounds. No wheezing, rhonchi or rales.    Abdominal:      General: Bowel sounds are normal. There is no distension. Palpations: Abdomen is soft. There is no mass. Tenderness: There is no abdominal tenderness. There is no guarding or rebound. Musculoskeletal:         General: No tenderness, deformity or signs of injury. Normal range of motion. Cervical back: Normal range of motion and neck supple. Right lower leg: No edema. Left lower leg: No edema. Skin:     General: Skin is warm and dry. Findings: No bruising, lesion or rash. Neurological:      General: No focal deficit present. Mental Status: He is alert and oriented to person, place, and time. Mental status is at baseline. Cranial Nerves: No cranial nerve deficit. Sensory: No sensory deficit. Motor: No weakness. Coordination: Coordination normal.      Gait: Gait normal.      Deep Tendon Reflexes: Reflexes normal.   Psychiatric:         Mood and Affect: Mood normal.         Behavior: Behavior normal.        Physical exam otherwise negative    Diagnostic Testing:    Laboratory Studies:  No visits with results within 6 Month(s) from this visit. Latest known visit with results is:   Office Visit on 05/11/2022   Component Date Value Ref Range Status    Vitamin D 25-Hydroxy 05/11/2022 108.7 (A)  30 - 100 ng/mL Final    Comment: (NOTE)  Deficiency               <20 ng/mL  Insufficiency          20-30 ng/mL  Sufficient             ng/mL  Possible toxicity       >100 ng/mL    The Method used is Siemens Advia Centaur currently standardized to a   Center of Disease Control and Prevention (CDC) certified reference   [address]. Samples containing fluorescein dye can produce falsely   elevated values when tested with the ADVIA Centaur Vitamin D Assay. It is recommended that results in the toxic range, >100 ng/mL, be   retested 72 hours post fluorescein exposure.       Sodium 05/11/2022 141  136 - 145 mmol/L Final    Potassium 05/11/2022 4.0  3.5 - 5.1 mmol/L Final    Chloride 05/11/2022 108 97 - 108 mmol/L Final    CO2 05/11/2022 27  21 - 32 mmol/L Final    Anion gap 05/11/2022 6  5 - 15 mmol/L Final    Glucose 05/11/2022 93  65 - 100 mg/dL Final    BUN 05/11/2022 21 (A)  6 - 20 MG/DL Final    Creatinine 05/11/2022 1.07  0.70 - 1.30 MG/DL Final    BUN/Creatinine ratio 05/11/2022 20  12 - 20   Final    GFR est AA 05/11/2022 >60  >60 ml/min/1.73m2 Final    GFR est non-AA 05/11/2022 >60  >60 ml/min/1.73m2 Final    Comment: Estimated GFR is calculated using the IDMS-traceable Modification of Diet in  Renal Disease (MDRD) Study equation, reported for both  Americans  (GFRAA) and non- Americans (GFRNA), and normalized to 1.73m2 body  surface area. The physician must decide which value applies to the patient. Calcium 05/11/2022 9.7  8.5 - 10.1 MG/DL Final         Lab Results   Component Value Date/Time    Cholesterol, total 174 11/08/2021 10:27 AM    Cholesterol (POC) 156.0 01/22/2018 10:51 AM    HDL Cholesterol 78 11/08/2021 10:27 AM    HDL Cholesterol (POC) 68.0 01/22/2018 10:51 AM    LDL Cholesterol (POC) 65.0 01/22/2018 10:51 AM    LDL, calculated 72.6 11/08/2021 10:27 AM    VLDL, calculated 23.4 11/08/2021 10:27 AM    Triglyceride 117 11/08/2021 10:27 AM    Triglycerides (POC) 115.0 01/22/2018 10:51 AM    CHOL/HDL Ratio 2.2 11/08/2021 10:27 AM     Lab Results   Component Value Date/Time    ALT (SGPT) 28 11/08/2021 10:27 AM    AST (SGOT) 17 11/08/2021 10:27 AM    Alk.  phosphatase 58 11/08/2021 10:27 AM    Bilirubin, total 0.7 11/08/2021 10:27 AM     Lab Results   Component Value Date/Time    WBC 7.0 11/08/2021 10:27 AM    HGB (POC) 14.6 08/02/2018 11:37 AM    HGB 15.0 11/08/2021 10:27 AM    HCT (POC) 44.4 08/02/2018 11:37 AM    HCT 47.7 11/08/2021 10:27 AM    PLATELET 285 40/83/0851 10:27 AM    MCV 93.7 11/08/2021 10:27 AM       Radiographic Studies:  XR Results (most recent):  Results from Hospital Encounter encounter on 10/24/22    XR CHEST PA LAT    Narrative  INDICATION: Adenocarcinoma, renal cell. EXAM: CXR 2 Views. COMPARISON: 5/2/2022. FINDINGS: Frontal and lateral views of the chest show the lungs are free of  acute disease. Heart size is normal. There is no pulmonary edema. There is no  evident pneumothorax or pleural effusion. Impression  No acute disease. No significant interval change. MOODY Results (most recent):  No results found for this or any previous visit. CT Results (most recent):  Results from Hospital Encounter encounter on 12/31/12    CTA ABD AO ILIACS W R/O    Narrative  **Final Report**      ICD Codes / Adm. Diagnosis: 289.81   / Primary hypercoagulable state  Examination:  CT ABD AORTA ILIACS ANGIOGRAPHY  - [id number] - Dec 31 2012  12:41PM  Accession No:  [id number]  Reason:  Primary hypercoagulabe state      REPORT:  CT angiography of the pelvis and bilateral legs was performed using 100 cc  [id number]-[id number]. Three-dimensional postprocessing was performed. Findings: The distal abdominal aorta is slightly calcified but not short  moderate or aneurysmal.  The aortic bifurcation is patent. Common iliac  arteries are patent bilaterally. External iliac arteries are patent  bilaterally. Common femoral arteries and superficial femoral arteries are patent  bilaterally with only minimal calcification. The popliteal arteries are patent bilaterally. There is no evidence for  popliteal artery aneurysm is questioned. There is expansion and probable thrombus within the right popliteal vein. There is three-vessel runoff bilaterally. IMPRESSION:  1. No evidence for popliteal artery aneurysm as questioned. 2.  There is expansion of the right popliteal vein which probably contains  thrombus. Patient was documented to have a right popliteal vein DVT in May  2010. The acuity of the finding on the current examination is unknown. The findings were called to Dr. Gabrielle Paz on today at 2 PM by myself.   [address]            Signing/Reading Doctor: Sheila Tristan Claudio Murphy ([id number])  Ailyn Monique ([id number])  Dec 31 2012  2:04PM     DEXA Results (most recent):  Results from Hospital Encounter encounter on 12/01/21    DEXA BONE DENSITY STUDY AXIAL    Narrative  Bone Mineral Density    Indication: Osteopenia  Age: 80  Sex: Male. Number of falls in the past year: 1. Risk factors for osteoporosis: None. .    Current medication for osteoporosis: Calcium and vitamin D. Comparison: 2019. Technique: Imaging was performed on the [employer]  meta-analysis fracture risk calculator (FRAX) analysis was performed for 10 year  fracture risk probability assessment. Excluded sites: Lumbar spine for degenerative changes. Findings:    Femoral Neck Left: . Bone mineral density (gm/cm2): 0.773.  % of peak bone mass: May 2.  % for age matched controls: 80.  T-score: -2.3.  Z-score: -0.6. Femoral Neck Right: . Bone mineral density (gm/cm2): 0.775.  % of peak bone mass: 71.  % for age matched controls: 80.  T-score: -2.4.  Z-score: -0.5. Total Hip Left: . Bone mineral density (gm/cm2): 0.82.  % of peak bone mass: 80.  % for age matched controls: 80.  T-score: -1.5.  Z-score: -0.3. Total Hip Right: . Bone mineral density (gm/cm2): 0.890.  % of peak bone mass: 81.  % for age matched controls: 80.  T-score: -1.5.  Z-score: -0.2. Impression  Impression: This patient is osteopenic using the World Health Organization criteria  As compared to the prior study, there has been no interval change. 10 year probability of major osteoporotic fracture: 13.5%. 10 year probability of hip fracture: 5.6%. Recommendations:  Therapy recommendations need to be tailored to each individual patient.  Using  the [facility]) FRAX absolute fracture algorithm, the  [address] [place] [address] recommends beginning pharmacological therapy in  postmenopausal women and men over the age of 48 with a 8 year probability of a  hip fracture of >3% OR with the 10 year probability of a major osteoporotic  fracture of >20%. Please reconsider testing based on risk factors. Currently, Medicare will only  reimburse for a central DXA examination every two years, unless the patient is  on chronic glucocorticoid therapy. Note: Please note that reliable, valid comparisons cannot be made between  studies which have been performed on machines from different manufacturers. If  clinically warranted, a follow up study performed at this site, on the same  unit, would allow the most sensitive assessment of change in bone mineral  density. MRI Results (most recent):  Results from [place] encounter on 02/23/12    MRI SHOULDER LEFT WITHOUT CONTRAST    Narrative  **Final Report**      ICD Codes / Adm. Diagnosis: 719.41   / PAIN IN JOINT, SHOULDER REGION  Examination:  MR SHOULDER WO CON LT  - [id number] - Feb 23 2012  3:40PM  Accession No:  [id number]  Reason:  shoulder pain      REPORT:  INDICATION: shoulder pain left    COMPARISON: None    EXAM: Oblique coronal T1-weighted spin-echo, axial fat suppressed proton  density weighted fast spin echo, oblique coronal fat-suppressed proton  density and T2-weighted fast spin-echo, and oblique sagittal fat-suppressed  T2-weighted fast spin-echo MR images of the left shoulder are obtained. FINDINGS: There is mild osteoarthritis of the acromioclavicular joint. There  is a type III acromion as well as lateral downsloping of the acromion. There  is diffuse supraspinatus and infraspinatus tendinopathy although no full  thickness or substantial partial thickness tendon tear is shown. There is  mild subcortical degenerative osseous signal change in the posterior  superolateral aspect of the humeral head. There is also a mild impaction  defect of the posterior superolateral humeral head consistent with a  Hill-Sachs lesion. There is no substantial joint or bursal effusion. The  long head biceps tendon is located. There is no obvious labral derangement. Mild glenohumeral joint osteoarthritis is shown with intermediate grade  central humeral head and posterior glenoid chondral derangement. No bone or  soft tissue mass or evidence for muscle atrophy or edema is shown. IMPRESSION:  1. No evidence for full thickness rotator cuff tendon tear. 2. Chronic mild Hill-Sachs lesion. Signing/Reading Doctor: Claudio Gill. Marshal Ann ([id number])  Approved: AKILAH Ann ([id number])  02/23/2012       Assessment/Plan:       ICD-10-CM ICD-9-CM    1. Routine adult health maintenance  Z00.00 V70.0       2. Hypercholesterolemia  E78.00 272.0 LIPID PANEL      3. Thromboembolus ([address]  I74.9 444.9 CBC WITH AUTOMATED DIFF      4. Essential hypertension  X24 261.5 METABOLIC PANEL, COMPREHENSIVE      5. Long term current use of anticoagulant therapy  Z79.01 V58.61                  Healthcare Maintenance:  - Preventive measures are reviewed as per above  - Up to date on routine interventions except as noted above  - Orders placed to update gaps as noted  - Notes: Up-to-date with COVID vaccines, other laboratory studies are ordered as noted above    Venous Thromboembolism:   - Previous location of VTE: recurrent DVT   - Current Anticoagulation:   Key Anti-Platelet Anticoagulant Meds               rivaroxaban (Xarelto) 20 mg tab tablet (Taking) Take 1 Tablet by mouth daily for 360 days. - Anticipated Duration of therapy:   - Excess Bleeding? No    - Continue Anticoagulation: Yes, Yes    - Notes: Asymptomatic, tolerating therapy, continuing current care    Essential Hypertension/Blood Pressure Management:   - Home BP Readings: not doing   - Current Control: optimal   - Target BP: Less than 130/80 mmHg   - Relevant BP Meds:  Key CAD CHF Meds               pravastatin (PRAVACHOL) 40 mg tablet (Taking) TAKE ONE TABLET BY MOUTH DAILY    amLODIPine (NORVASC) 2.5 mg tablet (Taking) Take 1 Tablet by mouth daily for 360 days.  HOLD for Systolic Blood Pressure less than 110 mmHg  Indications: high blood pressure    rivaroxaban (Xarelto) 20 mg tab tablet (Taking) Take 1 Tablet by mouth daily for 360 days.               - Plan: continue current treatment regimen, continue current meds, dietary sodium restriction, regular aerobic exercise   - Notes: Continuing with current care, laboratory studies reviewed as noted above    Hyperlipidemia/Dyslipidemia:   - Summary of Cardiovascular Risks and Goals:     LDL goal is under 100  hypertension  hyperlipidemia   -   Lab Results   Component Value Date/Time    LDL, calculated 72.6 11/08/2021 10:27 AM    HDL Cholesterol 78 11/08/2021 10:27 AM       - Relevant Cholesterol Meds:  Key Antihyperlipidemia Meds               pravastatin (PRAVACHOL) 40 mg tablet (Taking) TAKE ONE TABLET BY MOUTH DAILY              - Cholesterol at target: yes   - Does patient meet USPSTF and ACC/AHA indications for pharmacotherapy (e.g., statin): yes   - GI symptoms with meds: NO   - Muscle aches with meds: NO   - Other Adverse effects with meds: NO   - Medication Plan: continue   - Notes: Rechecking LDL, otherwise tolerating therapy, continuing with current pravastatin 40 mg daily        I have reviewed the patient's medical history in detail and updated the computerized patient record. We had a prolonged discussion about these complex clinical issues and went over the various important aspects to consider. All questions were answered. Advised the patient to call back or return to office if symptoms do not improve, change in nature, or persist.     The patient was given an after visit summary or informed of "[employer] Access which includes patient instructions, diagnoses, current medications, & vitals. he expressed understanding with the diagnosis and plan. Jason Terry MD    Please note that this dictation was completed with [employer], the [employer] voice recognition software.   Quite often unanticipated grammatical, syntax, homophones, and other interpretive errors are inadvertently transcribed by the computer software. Please disregard these errors. Please excuse any errors that have escaped final proofreading.

## 2022-11-23 ENCOUNTER — OFFICE VISIT (OUTPATIENT)
Dept: INTERNAL MEDICINE CLINIC | Age: 82
End: 2022-11-23
Payer: MEDICARE

## 2022-11-23 VITALS
SYSTOLIC BLOOD PRESSURE: 130 MMHG | OXYGEN SATURATION: 98 % | BODY MASS INDEX: 22.74 KG/M2 | HEART RATE: 54 BPM | WEIGHT: 171.6 LBS | RESPIRATION RATE: 14 BRPM | HEIGHT: 73 IN | DIASTOLIC BLOOD PRESSURE: 88 MMHG

## 2022-11-23 DIAGNOSIS — I74.9 THROMBOEMBOLUS (HCC): ICD-10-CM

## 2022-11-23 DIAGNOSIS — E78.00 HYPERCHOLESTEROLEMIA: ICD-10-CM

## 2022-11-23 DIAGNOSIS — Z79.01 LONG TERM CURRENT USE OF ANTICOAGULANT THERAPY: ICD-10-CM

## 2022-11-23 DIAGNOSIS — Z00.00 ROUTINE ADULT HEALTH MAINTENANCE: Primary | ICD-10-CM

## 2022-11-23 DIAGNOSIS — I10 ESSENTIAL HYPERTENSION: ICD-10-CM

## 2022-11-23 PROCEDURE — 3078F DIAST BP <80 MM HG: CPT | Performed by: INTERNAL MEDICINE

## 2022-11-23 PROCEDURE — 99214 OFFICE O/P EST MOD 30 MIN: CPT | Performed by: INTERNAL MEDICINE

## 2022-11-23 PROCEDURE — 99397 PER PM REEVAL EST PAT 65+ YR: CPT | Performed by: INTERNAL MEDICINE

## 2022-11-23 PROCEDURE — 3074F SYST BP LT 130 MM HG: CPT | Performed by: INTERNAL MEDICINE

## 2022-11-23 PROCEDURE — 1123F ACP DISCUSS/DSCN MKR DOCD: CPT | Performed by: INTERNAL MEDICINE

## 2022-11-23 NOTE — PROGRESS NOTES
Chief Complaint   Patient presents with    Follow-up       1. \"Have you been to the ER, urgent care clinic since your last visit? Hospitalized since your last visit? \"  [facility]    2. \"Have you seen or consulted any other health care providers outside of the [address] [place] [address] since your last visit? \" No     3. For patients aged 39-70: Has the patient had a colonoscopy / FIT/ Cologuard? No      If the patient is female:    4. For patients aged 41-77: Has the patient had a mammogram within the past 2 years? No      5. For patients aged 21-65: Has the patient had a pap smear?  No

## 2022-11-28 ENCOUNTER — LAB ONLY (OUTPATIENT)
Dept: INTERNAL MEDICINE CLINIC | Age: 82
End: 2022-11-28

## 2022-11-28 DIAGNOSIS — I10 ESSENTIAL HYPERTENSION: ICD-10-CM

## 2022-11-28 DIAGNOSIS — E78.00 HYPERCHOLESTEROLEMIA: ICD-10-CM

## 2022-11-28 DIAGNOSIS — I74.9 THROMBOEMBOLUS (HCC): ICD-10-CM

## 2022-11-29 LAB
ALBUMIN SERPL-MCNC: 3.6 G/DL (ref 3.5–5)
ALBUMIN/GLOB SERPL: 1.3 {RATIO} (ref 1.1–2.2)
ALP SERPL-CCNC: 56 U/L (ref 45–117)
ALT SERPL-CCNC: 28 U/L (ref 12–78)
ANION GAP SERPL CALC-SCNC: 6 MMOL/L (ref 5–15)
AST SERPL-CCNC: 18 U/L (ref 15–37)
BASOPHILS # BLD: 0.1 K/UL (ref 0–0.1)
BASOPHILS NFR BLD: 1 % (ref 0–1)
BILIRUB SERPL-MCNC: 0.6 MG/DL (ref 0.2–1)
BUN SERPL-MCNC: 14 MG/DL (ref 6–20)
BUN/CREAT SERPL: 13 (ref 12–20)
CALCIUM SERPL-MCNC: 9.6 MG/DL (ref 8.5–10.1)
CHLORIDE SERPL-SCNC: 105 MMOL/L (ref 97–108)
CHOLEST SERPL-MCNC: 178 MG/DL
CO2 SERPL-SCNC: 30 MMOL/L (ref 21–32)
CREAT SERPL-MCNC: 1.09 MG/DL (ref 0.7–1.3)
DIFFERENTIAL METHOD BLD: ABNORMAL
EOSINOPHIL # BLD: 0.1 K/UL (ref 0–0.4)
EOSINOPHIL NFR BLD: 1 % (ref 0–7)
ERYTHROCYTE [DISTWIDTH] IN BLOOD BY AUTOMATED COUNT: 14.7 % (ref 11.5–14.5)
GLOBULIN SER CALC-MCNC: 2.8 G/DL (ref 2–4)
GLUCOSE SERPL-MCNC: 93 MG/DL (ref 65–100)
HCT VFR BLD AUTO: 41.8 % (ref 36.6–50.3)
HDLC SERPL-MCNC: 62 MG/DL
HDLC SERPL: 2.9 {RATIO} (ref 0–5)
HGB BLD-MCNC: 13.4 G/DL (ref 12.1–17)
IMM GRANULOCYTES # BLD AUTO: 0 K/UL
IMM GRANULOCYTES NFR BLD AUTO: 0 %
LDLC SERPL CALC-MCNC: 92.4 MG/DL (ref 0–100)
LYMPHOCYTES # BLD: 1.2 K/UL (ref 0.8–3.5)
LYMPHOCYTES NFR BLD: 18 % (ref 12–49)
MCH RBC QN AUTO: 29.3 PG (ref 26–34)
MCHC RBC AUTO-ENTMCNC: 32.1 G/DL (ref 30–36.5)
MCV RBC AUTO: 91.3 FL (ref 80–99)
MONOCYTES # BLD: 0.7 K/UL (ref 0–1)
MONOCYTES NFR BLD: 10 % (ref 5–13)
NEUTS SEG # BLD: 4.5 K/UL (ref 1.8–8)
NEUTS SEG NFR BLD: 70 % (ref 32–75)
NRBC # BLD: 0 K/UL (ref 0–0.01)
NRBC BLD-RTO: 0 PER 100 WBC
PLATELET # BLD AUTO: 205 K/UL (ref 150–400)
PLATELET COMMENTS,PCOM: ABNORMAL
PMV BLD AUTO: 9.8 FL (ref 8.9–12.9)
POTASSIUM SERPL-SCNC: 4.7 MMOL/L (ref 3.5–5.1)
PROT SERPL-MCNC: 6.4 G/DL (ref 6.4–8.2)
RBC # BLD AUTO: 4.58 M/UL (ref 4.1–5.7)
RBC MORPH BLD: ABNORMAL
SODIUM SERPL-SCNC: 141 MMOL/L (ref 136–145)
TRIGL SERPL-MCNC: 118 MG/DL (ref ?–150)
VLDLC SERPL CALC-MCNC: 23.6 MG/DL
WBC # BLD AUTO: 6.6 K/UL (ref 4.1–11.1)

## 2022-12-06 ENCOUNTER — TELEPHONE (OUTPATIENT)
Dept: INTERNAL MEDICINE CLINIC | Age: 82
End: 2022-12-06

## 2022-12-06 NOTE — TELEPHONE ENCOUNTER
Patient is still having some tingling on the bottom of both feet. He had mention that he had said something during the visit.

## 2022-12-12 ENCOUNTER — OFFICE VISIT (OUTPATIENT)
Dept: INTERNAL MEDICINE CLINIC | Age: 82
End: 2022-12-12
Payer: MEDICARE

## 2022-12-12 VITALS
BODY MASS INDEX: 22.94 KG/M2 | WEIGHT: 173.1 LBS | SYSTOLIC BLOOD PRESSURE: 118 MMHG | HEIGHT: 73 IN | DIASTOLIC BLOOD PRESSURE: 64 MMHG | OXYGEN SATURATION: 98 % | RESPIRATION RATE: 15 BRPM | HEART RATE: 67 BPM | TEMPERATURE: 98.7 F

## 2022-12-12 DIAGNOSIS — E53.8 B12 DEFICIENCY: ICD-10-CM

## 2022-12-12 DIAGNOSIS — Z13.1 SCREENING FOR DIABETES MELLITUS: ICD-10-CM

## 2022-12-12 DIAGNOSIS — Z13.29 SCREENING FOR HYPOTHYROIDISM: ICD-10-CM

## 2022-12-12 DIAGNOSIS — G58.8 OTHER MONONEUROPATHY: Primary | ICD-10-CM

## 2022-12-12 PROCEDURE — 99214 OFFICE O/P EST MOD 30 MIN: CPT | Performed by: INTERNAL MEDICINE

## 2022-12-12 PROCEDURE — 1123F ACP DISCUSS/DSCN MKR DOCD: CPT | Performed by: INTERNAL MEDICINE

## 2022-12-12 NOTE — PROGRESS NOTES
Chief Complaint   Patient presents with    Tingling       1. \"Have you been to the ER, urgent care clinic since your last visit? Hospitalized since your last visit? \" No    2. \"Have you seen or consulted any other health care providers outside of the [address] [place] [address] since your last visit? \" No     3. For patients aged 39-70: Has the patient had a colonoscopy / FIT/ Cologuard? No      If the patient is female:    4. For patients aged 41-77: Has the patient had a mammogram within the past 2 years? No      5. For patients aged 21-65: Has the patient had a pap smear?  No

## 2022-12-12 NOTE — PROGRESS NOTES
ICD-10-CM ICD-9-CM    1. Other mononeuropathy  G58.8 355.9 SED RATE (ESR)      REFERRAL TO NEUROLOGY      SED RATE (ESR)      2. Screening for diabetes mellitus  Z13.1 V77.1 HEMOGLOBIN A1C WITH EAG      HEMOGLOBIN A1C WITH EAG      3. B12 deficiency  E53.8 266.2 VITAMIN B12      VITAMIN B12      4. Screening for hypothyroidism  Z13.29 V77.0 TSH 3RD GENERATION      TSH 3RD GENERATION               Subjective:     Chief Complaint   Patient presents with    Adali Mosquera is a 80 y.o. M. He has a past medical history of recurrent DVT with a past medical history of factor V Leiden mutation as well as hypertension and prostate cancer. I last saw this patient a couple weeks ago for establishment. At the time, he was feeling generally fine, and I had made no changes to his medication regimen at the time. Laboratory studies had been generally unremarkable. He comes in today for an acute complaint of tingling in the soles of his feet. This has actually been going on for the past several months. However, he thinks it has been more noticeable recently. He expresses a concern that he might have diabetes, although he has never had a problem with blood sugar readings in the past.  He says that this typically consist of a tingling sensation with pins and needle in the soles of his feet, which is typically constant, and not dependent upon position or activity. He denies having had any significant associated back pain although he does see a chiropractor for a longstanding history of lumbago, but overall feels that this has been generally symptomatically stable. No other red flag symptoms including any groin anesthesia or lower extremity weakness or incontinence. He notes that sometimes, when he is walking, he will feel that the tingling is worse or at least more noticeable.   He has not had previous assessment of this issue with nerve conduction studies in the past.  He denies any personal history of recent temperature intolerance, changes in energy level, or changes in bowel habits. He continues on vitamin B12 supplementation for a previous history of B12 deficiency, although he does not know why he really started this medication. He otherwise denies fevers or chills, night sweats, or other constitutional or systemic complaints. His review of systems is otherwise negative. Routine Healthcare Maintenance issues are reviewed and discussed with the patient as noted below. Orders to update gaps in healthcare maintenance were placed as noted below in the Assessment and Plan, where applicable.      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    Essential hypertension 06/2022    RX = Amlodipine 2.5 mg/d    Factor V Leiden mutation ([facility] [address] 10/03/2017    Gastroesophageal reflux disease without esophagitis 10/03/2017    Herpes zoster virus infection of face and ear nerves 10/03/2017    History of allergic rhinitis 10/03/2017    purulent    History of colonic polyps 10/03/2017    History of DVT (deep vein thrombosis)     DVT    History of prostate cancer     Prostate    History of prostate cancer     History of renal cell carcinoma 10/03/2017    History of toe fracture 10/03/2017    left sided    Hypercholesterolemia 10/03/2017    Lumbago 10/03/2017    Osteopenia 10/03/2017    FRAX 2021 = 13/5.6%    Other ill-defined conditions(799.89)     doctor does not want him to have quinolones family -causes achilles tendonitis    Recurrent deep vein thrombosis (DVT) ([facility] [address] 10/03/2017    Vitamin B12 deficiency     Vitamin D deficiency 10/03/2017       Past Surgical Histor:  Past Surgical History:   Procedure Laterality Date    COLORECTAL SCRN; HI RISK IND  2/3/2015         HX ORTHOPAEDIC      cortisone shot in knee and elbow    HX OTHER SURGICAL      colonoscopy    HX TONSILLECTOMY      HX UROLOGICAL Right     Kidney Removal       Allergies: Allergies   Allergen Reactions    Quinolones Unknown (comments)       Medications:  Current Outpatient Medications   Medication Sig Dispense Refill    pravastatin (PRAVACHOL) 40 mg tablet TAKE ONE TABLET BY MOUTH DAILY 90 Tablet 3    amLODIPine (NORVASC) 2.5 mg tablet Take 1 Tablet by mouth daily for 360 days. HOLD for Systolic Blood Pressure less than 110 mmHg  Indications: high blood pressure 90 Tablet 3    calcium-cholecalciferol, d3, (CALCIUM 600 + D) 600-125 mg-unit tab Take 1 Tablet by mouth daily for 360 days. 90 Tablet 3    rivaroxaban (Xarelto) 20 mg tab tablet Take 1 Tablet by mouth daily for 360 days. 90 Tablet 3    cyanocobalamin (VITAMIN B12) 500 mcg tablet Take 500 mcg by mouth daily. acetaminophen (TYLENOL) 500 mg tablet Take 1,000 mg by mouth every six (6) hours as needed for Pain. diphenhydrAMINE (BENADRYL) 25 mg capsule Take 25 mg by mouth every six (6) hours as needed for Itching or Sleep (allergies).          Social History:  Social History     Socioeconomic History    Marital status:    Tobacco Use    Smoking status: Never    Smokeless tobacco: Never   Vaping Use    Vaping Use: Never used   Substance and Sexual Activity    Alcohol use: No    Drug use: No       Family History:  Family History   Problem Relation Age of Onset    Hypertension Mother     Other Mother         abdominal aneurysm    Cancer Father         bladder        Immunizations:  Immunization History   Administered Date(s) Administered    COVID-19, PFIZER PURPLE top, DILUTE for use, (age 15 y+), IM, 30mcg/0.3mL 01/28/2021, 02/25/2021, 11/02/2021    Influenza High Dose Vaccine PF 09/22/2017    Influenza Vaccine 09/25/2014, 10/19/2015, 10/26/2016    Influenza Vaccine (Tri) Adjuvanted (>65 Yrs FLUAD TRI [id number]) 09/21/2018, 11/07/2019    Influenza, FLUAD, (age 72 y+), Adjuvanted 10/12/2020, 10/22/2021, 11/03/2022    Pneumococcal Conjugate (PCV-13) 10/19/2015    Pneumococcal Polysaccharide (PPSV-23) 12/01/2005    Tdap 08/02/2018    Zoster Recombinant 01/20/2019, 05/28/2019        Healthcare Maintenance:  Health Maintenance   Topic Date Due    COVID-19 Vaccine (4 - Booster for Pfizer series) 12/28/2021    Medicare Yearly Exam  05/12/2023    Depression Screen  11/03/2023    Lipid Screen  11/28/2023    DTaP/Tdap/Td series (2 - Td or Tdap) 08/02/2028    Shingrix Vaccine Age 50>  Completed    Flu Vaccine  Completed    Pneumococcal 65+ years  Completed        Review of Systems:  ROS:  Review of Systems   Constitutional: Negative. HENT: Negative. Eyes: Negative. Respiratory: Negative. Cardiovascular: Negative. Gastrointestinal: Negative. Genitourinary: Negative. Musculoskeletal: Negative. Skin: Negative. Neurological:  Positive for tingling and sensory change. Negative for dizziness, tremors, speech change, focal weakness, seizures, loss of consciousness and weakness. Endo/Heme/Allergies: Negative. Psychiatric/Behavioral: Negative. ROS otherwise negative      Objective:     Vital Signs:  Visit Vitals  /64 (BP 1 Location: Left upper arm, BP Patient Position: Sitting, BP Cuff Size: Adult)   Pulse 67   Temp 98.7 °F (37.1 °C) (Oral)   Resp 15   Ht 6' 1\" (1.854 m)   Wt 173 lb 1.6 oz (78.5 kg)   SpO2 98%   BMI 22.84 kg/m²       BMI:  Body mass index is 22.84 kg/m². Physical Examination:  Physical Exam  Vitals reviewed. Constitutional:       Appearance: Normal appearance. HENT:      Head: Normocephalic and atraumatic. Nose: Nose normal.      Mouth/Throat:      Mouth: Mucous membranes are moist.   Eyes:      Extraocular Movements: Extraocular movements intact. Conjunctiva/sclera: Conjunctivae normal.      Pupils: Pupils are equal, round, and reactive to light. Cardiovascular:      Rate and Rhythm: Normal rate and regular rhythm.       Pulses: Normal pulses. Heart sounds: Normal heart sounds. No murmur heard. No friction rub. No gallop. Pulmonary:      Effort: Pulmonary effort is normal. No respiratory distress. Breath sounds: Normal breath sounds. No wheezing, rhonchi or rales. Abdominal:      General: Bowel sounds are normal. There is no distension. Palpations: Abdomen is soft. There is no mass. Tenderness: There is no abdominal tenderness. There is no guarding or rebound. Musculoskeletal:         General: No tenderness, deformity or signs of injury. Normal range of motion. Cervical back: Normal range of motion and neck supple. Right lower leg: No edema. Left lower leg: No edema. Skin:     General: Skin is warm and dry. Findings: No bruising, lesion or rash. Neurological:      General: No focal deficit present. Mental Status: He is alert and oriented to person, place, and time. Mental status is at baseline. Cranial Nerves: No cranial nerve deficit. Sensory: No sensory deficit. Motor: No weakness. Coordination: Coordination normal.      Gait: Gait normal.      Deep Tendon Reflexes: Reflexes normal.   Psychiatric:         Mood and Affect: Mood normal.         Behavior: Behavior normal.        Physical exam otherwise negative    Diagnostic Testing:    Laboratory Studies:  Lab Only on 11/28/2022   Component Date Value Ref Range Status    Cholesterol, total 11/28/2022 178  <200 MG/DL Final    Triglyceride 11/28/2022 118  <150 MG/DL Final    Based on NCEP-ATP III:  Triglycerides <150 mg/dL  is considered normal, 150-199 mg/dL  borderline high,  200-499 mg/dL high and  greater than or equal to 500 mg/dL very high. HDL Cholesterol 11/28/2022 62  MG/DL Final    Based on NCEP ATP III, HDL Cholesterol <40 mg/dL is considered low and >60 mg/dL is elevated.     LDL, calculated 11/28/2022 92.4  0 - 100 MG/DL Final    Comment: Based on the NCEP-ATP: LDL-C concentrations are considered optimal <100 mg/dL,  near optimal/above Normal 100-129 mg/dL  Borderline High: 130-159, High: 160-189 mg/dL  Very High: Greater than or equal to 190 mg/dL      VLDL, calculated 11/28/2022 23.6  MG/DL Final    CHOL/HDL Ratio 11/28/2022 2.9  0.0 - 5.0   Final    Sodium 11/28/2022 141  136 - 145 mmol/L Final    Potassium 11/28/2022 4.7  3.5 - 5.1 mmol/L Final    Chloride 11/28/2022 105  97 - 108 mmol/L Final    CO2 11/28/2022 30  21 - 32 mmol/L Final    Anion gap 11/28/2022 6  5 - 15 mmol/L Final    Glucose 11/28/2022 93  65 - 100 mg/dL Final    BUN 11/28/2022 14  6 - 20 MG/DL Final    Creatinine 11/28/2022 1.09  0.70 - 1.30 MG/DL Final    BUN/Creatinine ratio 11/28/2022 13  12 - 20   Final    eGFR 11/28/2022 >60  >60 ml/min/1.73m2 Final    Comment:   Pediatric calculator link: [email or web address]/kdoqi/[email or web address]_[email or web address]    Effective Oct 3, 2022    These results are not intended for use in patients <25years of age. eGFR results are calculated without a race factor using  the 2021 CKD-EPI equation. Careful clinical correlation is recommended, particularly when comparing to results calculated using previous equations. The CKD-EPI equation is less accurate in patients with extremes of muscle mass, extra-renal metabolism of creatinine, excessive creatine ingestion, or following therapy that affects renal tubular secretion. Calcium 11/28/2022 9.6  8.5 - 10.1 MG/DL Final    Bilirubin, total 11/28/2022 0.6  0.2 - 1.0 MG/DL Final    ALT (SGPT) 11/28/2022 28  12 - 78 U/L Final    AST (SGOT) 11/28/2022 18  15 - 37 U/L Final    Alk.  phosphatase 11/28/2022 56  45 - 117 U/L Final    Protein, total 11/28/2022 6.4  6.4 - 8.2 g/dL Final    Albumin 11/28/2022 3.6  3.5 - 5.0 g/dL Final    Globulin 11/28/2022 2.8  2.0 - 4.0 g/dL Final    A-G Ratio 11/28/2022 1.3  1.1 - 2.2   Final    WBC 11/28/2022 6.6  4.1 - 11.1 K/uL Final    RBC 11/28/2022 4.58  4.10 - 5.70 M/uL Final    HGB 11/28/2022 13.4  12.1 - 17.0 g/dL Final    HCT 11/28/2022 41.8  36.6 - 50.3 % Final    MCV 11/28/2022 91.3  80.0 - 99.0 FL Final    MCH 11/28/2022 29.3  26.0 - 34.0 PG Final    MCHC 11/28/2022 32.1  30.0 - 36.5 g/dL Final    RDW 11/28/2022 14.7 (A)  11.5 - 14.5 % Final    PLATELET 73/22/3317 339  150 - 400 K/uL Final    MPV 11/28/2022 9.8  8.9 - 12.9 FL Final    NRBC 11/28/2022 0.0  0  WBC Final    ABSOLUTE NRBC 11/28/2022 0.00  0.00 - 0.01 K/uL Final    NEUTROPHILS 11/28/2022 70  32 - 75 % Final    LYMPHOCYTES 11/28/2022 18  12 - 49 % Final    MONOCYTES 11/28/2022 10  5 - 13 % Final    EOSINOPHILS 11/28/2022 1  0 - 7 % Final    BASOPHILS 11/28/2022 1  0 - 1 % Final    IMMATURE GRANULOCYTES 11/28/2022 0  % Final    ABS. NEUTROPHILS 11/28/2022 4.5  1.8 - 8.0 K/UL Final    ABS. LYMPHOCYTES 11/28/2022 1.2  0.8 - 3.5 K/UL Final    ABS. MONOCYTES 11/28/2022 0.7  0.0 - 1.0 K/UL Final    ABS. EOSINOPHILS 11/28/2022 0.1  0.0 - 0.4 K/UL Final    ABS. BASOPHILS 11/28/2022 0.1  0.0 - 0.1 K/UL Final    ABS. IMM. GRANS. 11/28/2022 0.0  K/UL Final    DF 11/28/2022 MANUAL    Final    PLATELET COMMENTS 00/96/9559 Large Platelets    Final    PRESENT    RBC COMMENTS 11/28/2022 NORMOCYTIC, NORMOCHROMIC    Final         Radiographic Studies:  XR Results (most recent):  Results from Hospital Encounter encounter on 10/24/22    XR CHEST PA LAT    Narrative  INDICATION: Adenocarcinoma, renal cell. EXAM: CXR 2 Views. COMPARISON: 5/2/2022. FINDINGS: Frontal and lateral views of the chest show the lungs are free of  acute disease. Heart size is normal. There is no pulmonary edema. There is no  evident pneumothorax or pleural effusion. Impression  No acute disease. No significant interval change. MOODY Results (most recent):  No results found for this or any previous visit. CT Results (most recent):  Results from Hospital Encounter encounter on 12/31/12    CTA ABD AO ILIACS W R/O    Narrative  **Final Report**      ICD Codes / Adm. Diagnosis: 289.81   / Primary hypercoagulable state  Examination:  CT ABD AORTA ILIACS ANGIOGRAPHY  - [id number] - Dec 31 2012  12:41PM  Accession No:  [id number]  Reason:  Primary hypercoagulabe state      REPORT:  CT angiography of the pelvis and bilateral legs was performed using 100 cc  Optiray-350. Three-dimensional postprocessing was performed. Findings: The distal abdominal aorta is slightly calcified but not short  moderate or aneurysmal.  The aortic bifurcation is patent. Common iliac  arteries are patent bilaterally. External iliac arteries are patent  bilaterally. Common femoral arteries and superficial femoral arteries are patent  bilaterally with only minimal calcification. The popliteal arteries are patent bilaterally. There is no evidence for  popliteal artery aneurysm is questioned. There is expansion and probable thrombus within the right popliteal vein. There is three-vessel runoff bilaterally. IMPRESSION:  1. No evidence for popliteal artery aneurysm as questioned. 2.  There is expansion of the right popliteal vein which probably contains  thrombus. Patient was documented to have a right popliteal vein DVT in May  2010. The acuity of the finding on the current examination is unknown. The findings were called to Dr. Arron Angela on today at 2 PM by myself. [address]            Signing/Reading Doctor: Matt Anthony ([id number])  Approved: Matt Anthony ([id number])  Dec 31 2012  2:04PM     DEXA Results (most recent):  Results from Hospital Encounter encounter on 12/01/21    DEXA BONE DENSITY STUDY AXIAL    Narrative  Bone Mineral Density    Indication: Osteopenia  Age: 80  Sex: Male. Number of falls in the past year: 1. Risk factors for osteoporosis: None. .    Current medication for osteoporosis: Calcium and vitamin D. Comparison: 2019.     Technique: Imaging was performed on the [employer]  meta-analysis fracture risk calculator (FRAX) analysis was performed for 10 year  fracture risk probability assessment. Excluded sites: Lumbar spine for degenerative changes. Findings:    Femoral Neck Left: . Bone mineral density (gm/cm2): 0.773.  % of peak bone mass: May 2.  % for age matched controls: 80.  T-score: -2.3.  Z-score: -0.6. Femoral Neck Right: . Bone mineral density (gm/cm2): 0.775.  % of peak bone mass: 71.  % for age matched controls: 80.  T-score: -2.4.  Z-score: -0.5. Total Hip Left: . Bone mineral density (gm/cm2): 0.82.  % of peak bone mass: 80.  % for age matched controls: 80.  T-score: -1.5.  Z-score: -0.3. Total Hip Right: . Bone mineral density (gm/cm2): 0.890.  % of peak bone mass: 81.  % for age matched controls: 80.  T-score: -1.5.  Z-score: -0.2. Impression  Impression: This patient is osteopenic using the World Health Organization criteria  As compared to the prior study, there has been no interval change. 10 year probability of major osteoporotic fracture: 13.5%. 10 year probability of hip fracture: 5.6%. Recommendations:  Therapy recommendations need to be tailored to each individual patient. Using  the [facility] FRAX absolute fracture algorithm, the  [address] [place] [address] recommends beginning pharmacological therapy in  postmenopausal women and men over the age of 48 with a 8 year probability of a  hip fracture of >3% OR with the 10 year probability of a major osteoporotic  fracture of >20%. Please reconsider testing based on risk factors. Currently, Medicare will only  reimburse for a central DXA examination every two years, unless the patient is  on chronic glucocorticoid therapy. Note: Please note that reliable, valid comparisons cannot be made between  studies which have been performed on machines from different manufacturers.  If  clinically warranted, a follow up study performed at this site, on the same  unit, would allow the most sensitive assessment of change in bone mineral  density. MRI Results (most recent):  Results from [place] encounter on 02/23/12    MRI SHOULDER LEFT WITHOUT CONTRAST    Narrative  **Final Report**      ICD Codes / Adm. Diagnosis: 719.41   / PAIN IN JOINT, SHOULDER REGION  Examination:  MR GLENN DELGADO LT  - [id number] - Feb 23 2012  3:40PM  Accession No:  [id number]  Reason:  shoulder pain      REPORT:  INDICATION: shoulder pain left    COMPARISON: None    EXAM: Oblique coronal T1-weighted spin-echo, axial fat suppressed proton  density weighted fast spin echo, oblique coronal fat-suppressed proton  density and T2-weighted fast spin-echo, and oblique sagittal fat-suppressed  T2-weighted fast spin-echo MR images of the left shoulder are obtained. FINDINGS: There is mild osteoarthritis of the acromioclavicular joint. There  is a type III acromion as well as lateral downsloping of the acromion. There  is diffuse supraspinatus and infraspinatus tendinopathy although no full  thickness or substantial partial thickness tendon tear is shown. There is  mild subcortical degenerative osseous signal change in the posterior  superolateral aspect of the humeral head. There is also a mild impaction  defect of the posterior superolateral humeral head consistent with a  Hill-Sachs lesion. There is no substantial joint or bursal effusion. The  long head biceps tendon is located. There is no obvious labral derangement. Mild glenohumeral joint osteoarthritis is shown with intermediate grade  central humeral head and posterior glenoid chondral derangement. No bone or  soft tissue mass or evidence for muscle atrophy or edema is shown. IMPRESSION:  1. No evidence for full thickness rotator cuff tendon tear. 2. Chronic mild Hill-Sachs lesion. Signing/Reading Doctor: Susan Hager. Delfino Abernathy ([id number])  Approved: AKILAH Abernathy ([id number])  02/23/2012       Assessment/Plan:       ICD-10-CM ICD-9-CM    1.  Other mononeuropathy  G58.8 355.9 SED RATE (ESR) REFERRAL TO NEUROLOGY      SED RATE (ESR)      2. Screening for diabetes mellitus  Z13.1 V77.1 HEMOGLOBIN A1C WITH EAG      HEMOGLOBIN A1C WITH EAG      3. B12 deficiency  E53.8 266.2 VITAMIN B12      VITAMIN B12      4. Screening for hypothyroidism  Z13.29 V77.0 TSH 3RD GENERATION      TSH 3RD GENERATION             Peripheral neuropathy:  - I am unable to reproduce his symptoms today but the patient says that he typically has some low level tingling in the soles of his feet at all times. Referral was placed from nerve conduction studies as noted above; checking some additional laboratory studies to rule out hypothyroidism, highly doubt diabetes, or other inflammatory condition. Doubt CIDP. Note no significant muscle weakness. Checking vitamin B12 levels although I am doubtful of this as well. Idiopathic neuropathy a consideration. Awaiting nerve conduction studies noted above. Return precautions provided to patient who endorses agreement and understanding. I have reviewed the patient's medical history in detail and updated the computerized patient record. We had a prolonged discussion about these complex clinical issues and went over the various important aspects to consider. All questions were answered. Advised the patient to call back or return to office if symptoms do not improve, change in nature, or persist.     The patient was given an after visit summary or informed of [facility] which includes patient instructions, diagnoses, current medications, & vitals. he expressed understanding with the diagnosis and plan. Jason Terry MD    Please note that this dictation was completed with [employer], the computer voice recognition software. Quite often unanticipated grammatical, syntax, homophones, and other interpretive errors are inadvertently transcribed by the computer software. Please disregard these errors. Please excuse any errors that have escaped final proofreading.

## 2022-12-13 LAB
ERYTHROCYTE [SEDIMENTATION RATE] IN BLOOD: 6 MM/HR (ref 0–20)
EST. AVERAGE GLUCOSE BLD GHB EST-MCNC: 105 MG/DL
HBA1C MFR BLD: 5.3 % (ref 4–5.6)
TSH SERPL DL<=0.05 MIU/L-ACNC: 2.02 UIU/ML (ref 0.36–3.74)
VIT B12 SERPL-MCNC: 612 PG/ML (ref 193–986)

## 2023-04-25 ENCOUNTER — HOSPITAL ENCOUNTER (OUTPATIENT)
Dept: GENERAL RADIOLOGY | Age: 83
Discharge: HOME | End: 2023-04-25
Payer: MEDICARE

## 2023-04-25 ENCOUNTER — TRANSCRIBE ORDER (OUTPATIENT)
Dept: REGISTRATION | Age: 83
End: 2023-04-25

## 2023-04-25 DIAGNOSIS — C64.9: ICD-10-CM

## 2023-04-25 DIAGNOSIS — Z85.528 HISTORY OF KIDNEY CANCER: ICD-10-CM

## 2023-04-25 DIAGNOSIS — Z85.528 HISTORY OF KIDNEY CANCER: Primary | ICD-10-CM

## 2023-04-25 PROCEDURE — 71046 X-RAY EXAM CHEST 2 VIEWS: CPT

## 2023-05-23 RX ORDER — RIVAROXABAN 20 MG/1
TABLET, FILM COATED ORAL
Qty: 90 TABLET | Refills: 3 | Status: SHIPPED | OUTPATIENT
Start: 2023-05-23

## 2023-05-23 NOTE — TELEPHONE ENCOUNTER
Requested Prescriptions     Pending Prescriptions Disp Refills    XARELTO 20 MG TABS tablet [Pharmacy Med Name: Daphine Shillings 20 MG TABLET] 90 tablet 3     Sig: TAKE ONE TABLET BY MOUTH DAILY       RX refill request from the patient/pharmacy. Patient last seen 12/12/22 with labs, and next appt. scheduled for 7/13/23.

## 2023-05-24 ENCOUNTER — OFFICE VISIT (OUTPATIENT)
Facility: CLINIC | Age: 83
End: 2023-05-24
Payer: MEDICARE

## 2023-05-24 VITALS
RESPIRATION RATE: 18 BRPM | SYSTOLIC BLOOD PRESSURE: 122 MMHG | OXYGEN SATURATION: 98 % | BODY MASS INDEX: 22.64 KG/M2 | TEMPERATURE: 98 F | WEIGHT: 170.8 LBS | HEIGHT: 73 IN | HEART RATE: 70 BPM | DIASTOLIC BLOOD PRESSURE: 82 MMHG

## 2023-05-24 DIAGNOSIS — Z85.46 HISTORY OF PROSTATE CANCER: ICD-10-CM

## 2023-05-24 DIAGNOSIS — M85.80 OSTEOPENIA, UNSPECIFIED LOCATION: ICD-10-CM

## 2023-05-24 DIAGNOSIS — E78.00 HYPERCHOLESTEROLEMIA: ICD-10-CM

## 2023-05-24 DIAGNOSIS — C64.1 MALIGNANT NEOPLASM OF RIGHT KIDNEY, EXCEPT RENAL PELVIS (HCC): ICD-10-CM

## 2023-05-24 DIAGNOSIS — Z00.00 MEDICARE ANNUAL WELLNESS VISIT, SUBSEQUENT: Primary | ICD-10-CM

## 2023-05-24 DIAGNOSIS — D68.51 ACTIVATED PROTEIN C RESISTANCE (HCC): ICD-10-CM

## 2023-05-24 DIAGNOSIS — I10 ESSENTIAL HYPERTENSION: ICD-10-CM

## 2023-05-24 DIAGNOSIS — G62.89 OTHER POLYNEUROPATHY: ICD-10-CM

## 2023-05-24 DIAGNOSIS — I74.9 EMBOLISM AND THROMBOSIS OF UNSPECIFIED ARTERY (HCC): ICD-10-CM

## 2023-05-24 PROCEDURE — 3074F SYST BP LT 130 MM HG: CPT | Performed by: INTERNAL MEDICINE

## 2023-05-24 PROCEDURE — G0439 PPPS, SUBSEQ VISIT: HCPCS | Performed by: INTERNAL MEDICINE

## 2023-05-24 PROCEDURE — 1123F ACP DISCUSS/DSCN MKR DOCD: CPT | Performed by: INTERNAL MEDICINE

## 2023-05-24 PROCEDURE — 3079F DIAST BP 80-89 MM HG: CPT | Performed by: INTERNAL MEDICINE

## 2023-05-24 RX ORDER — ERGOCALCIFEROL 1.25 MG/1
CAPSULE ORAL
COMMUNITY
Start: 2018-04-03

## 2023-05-24 RX ORDER — ASCORBIC ACID 500 MG
500 TABLET ORAL DAILY
COMMUNITY

## 2023-05-24 RX ORDER — IBANDRONATE SODIUM 150 MG/1
TABLET, FILM COATED ORAL
COMMUNITY
Start: 2022-12-19

## 2023-05-24 RX ORDER — TAMSULOSIN HYDROCHLORIDE 0.4 MG/1
CAPSULE ORAL
COMMUNITY
Start: 2023-05-17

## 2023-05-24 RX ORDER — TROSPIUM CHLORIDE 20 MG/1
TABLET, FILM COATED ORAL
COMMUNITY
Start: 2022-08-25

## 2023-05-24 ASSESSMENT — ENCOUNTER SYMPTOMS
GASTROINTESTINAL NEGATIVE: 1
EYES NEGATIVE: 1
RESPIRATORY NEGATIVE: 1
ALLERGIC/IMMUNOLOGIC NEGATIVE: 1

## 2023-05-24 ASSESSMENT — PATIENT HEALTH QUESTIONNAIRE - PHQ9
SUM OF ALL RESPONSES TO PHQ QUESTIONS 1-9: 0
SUM OF ALL RESPONSES TO PHQ QUESTIONS 1-9: 0
2. FEELING DOWN, DEPRESSED OR HOPELESS: 0
SUM OF ALL RESPONSES TO PHQ QUESTIONS 1-9: 0
SUM OF ALL RESPONSES TO PHQ9 QUESTIONS 1 & 2: 0
1. LITTLE INTEREST OR PLEASURE IN DOING THINGS: 0
SUM OF ALL RESPONSES TO PHQ QUESTIONS 1-9: 0

## 2023-05-24 ASSESSMENT — LIFESTYLE VARIABLES
HOW OFTEN DO YOU HAVE A DRINK CONTAINING ALCOHOL: NEVER
HOW MANY STANDARD DRINKS CONTAINING ALCOHOL DO YOU HAVE ON A TYPICAL DAY: PATIENT DOES NOT DRINK

## 2023-05-24 NOTE — PROGRESS NOTES
Medicare Annual Wellness Visit    Nicky Rivera is here for Medicare AWV    Assessment & Plan   Medicare annual wellness visit, subsequent  Embolism and thrombosis of unspecified artery ([facility] [address]  Malignant neoplasm of right kidney, except renal pelvis ([address]  Activated protein C resistance ([facility] [address]  Essential hypertension  Hypercholesterolemia  Osteopenia, unspecified location  History of prostate cancer  Other polyneuropathy  -     Edelmira Goldberg, MD, Neurology, [place]      Recommendations for Preventive Services Due: see orders and patient instructions/AVS.  Recommended screening schedule for the next 5-10 years is provided to the patient in written form: see Patient Instructions/AVS.     No follow-ups on file. Subjective       Patient's complete Health Risk Assessment and screening values have been reviewed and are found in Flowsheets. The following problems were reviewed today and where indicated follow up appointments were made and/or referrals ordered. Positive Risk Factor Screenings with Interventions:                                       Objective   Vitals:    05/24/23 1014   BP: 122/82   Site: Left Upper Arm   Position: Sitting   Cuff Size: Medium Adult   Pulse: 70   Resp: 18   Temp: 98 °F (36.7 °C)   TempSrc: Oral   SpO2: 98%   Weight: 170 lb 12.8 oz (77.5 kg)   Height: 6' 1\" (1.854 m)      Body mass index is 22.53 kg/m². Allergies   Allergen Reactions    Quinolones      Other reaction(s): Unknown (comments)     Prior to Visit Medications    Medication Sig Taking?  Authorizing Provider   ibandronate (BONIVA) 150 MG tablet 1 tab(s) orally once a month for 90 days Yes Historical Provider, MD   tamsulosin (FLOMAX) 0.4 MG capsule  Yes Historical Provider, MD   ergocalciferol (ERGOCALCIFEROL) 1.25 MG (46405 UT) capsule 1 cap(s) orally twice a month for 84 day(s) Yes Historical Provider, MD   vitamin C (ASCORBIC ACID) 500 MG tablet Take 1 tablet by mouth daily Yes Historical Provider,

## 2023-05-24 NOTE — PROGRESS NOTES
Chief Complaint   Patient presents with    Medicare AWV       /82 (Site: Left Upper Arm, Position: Sitting, Cuff Size: Medium Adult)   Pulse 70   Temp 98 °F (36.7 °C) (Oral)   Resp 18   Ht 6' 1\" (1.854 m)   Wt 170 lb 12.8 oz (77.5 kg)   SpO2 98%   BMI 22.53 kg/m²     1. \"Have you been to the ER, urgent care clinic since your last visit? Hospitalized since your last visit? \" NO    2. \"Have you seen or consulted any other health care providers outside of the [address] [place] [address] since your last visit? \" NO     3. For patients aged 39-70: Has the patient had a colonoscopy / FIT/ Cologuard? No      If the patient is female:    4. For patients aged 41-77: Has the patient had a mammogram within the past 2 years? NO      5. For patients aged 21-65: Has the patient had a pap smear?  NO

## 2023-05-24 NOTE — PATIENT INSTRUCTIONS
[place] on Aging online. You need 5507-6262 mg of calcium and 2426-2234 IU of vitamin D per day. It is possible to meet your calcium requirement with diet alone, but a vitamin D supplement is usually necessary to meet this goal.  When exposed to the sun, use a sunscreen that protects against both UVA and UVB radiation with an SPF of 30 or greater. Reapply every 2 to 3 hours or after sweating, drying off with a towel, or swimming. Always wear a seat belt when traveling in a car. Always wear a helmet when riding a bicycle or motorcycle.

## 2023-07-13 ENCOUNTER — OFFICE VISIT (OUTPATIENT)
Age: 83
End: 2023-07-13
Payer: MEDICARE

## 2023-07-13 VITALS
DIASTOLIC BLOOD PRESSURE: 80 MMHG | HEIGHT: 72 IN | HEART RATE: 70 BPM | TEMPERATURE: 98.1 F | BODY MASS INDEX: 23.03 KG/M2 | RESPIRATION RATE: 15 BRPM | WEIGHT: 170 LBS | SYSTOLIC BLOOD PRESSURE: 144 MMHG | OXYGEN SATURATION: 97 %

## 2023-07-13 DIAGNOSIS — Z85.46 HISTORY OF PROSTATE CANCER: ICD-10-CM

## 2023-07-13 DIAGNOSIS — D68.51 FACTOR V LEIDEN MUTATION (HCC): ICD-10-CM

## 2023-07-13 DIAGNOSIS — R45.89 DEPRESSED MOOD: ICD-10-CM

## 2023-07-13 DIAGNOSIS — Z85.528 HISTORY OF KIDNEY CANCER: ICD-10-CM

## 2023-07-13 DIAGNOSIS — M85.80 OSTEOPENIA, UNSPECIFIED LOCATION: ICD-10-CM

## 2023-07-13 DIAGNOSIS — M79.671 PAIN IN BOTH FEET: ICD-10-CM

## 2023-07-13 DIAGNOSIS — R53.83 FATIGUE, UNSPECIFIED TYPE: ICD-10-CM

## 2023-07-13 DIAGNOSIS — I82.409 RECURRENT DEEP VEIN THROMBOSIS (DVT) (HCC): ICD-10-CM

## 2023-07-13 DIAGNOSIS — N40.0 BENIGN PROSTATIC HYPERPLASIA, UNSPECIFIED WHETHER LOWER URINARY TRACT SYMPTOMS PRESENT: ICD-10-CM

## 2023-07-13 DIAGNOSIS — M79.672 PAIN IN BOTH FEET: ICD-10-CM

## 2023-07-13 DIAGNOSIS — E78.00 HYPERCHOLESTEROLEMIA: Primary | ICD-10-CM

## 2023-07-13 PROBLEM — Z87.81 HISTORY OF TOE FRACTURE: Status: RESOLVED | Noted: 2017-10-03 | Resolved: 2023-07-13

## 2023-07-13 PROCEDURE — 99214 OFFICE O/P EST MOD 30 MIN: CPT | Performed by: STUDENT IN AN ORGANIZED HEALTH CARE EDUCATION/TRAINING PROGRAM

## 2023-07-13 PROCEDURE — 1036F TOBACCO NON-USER: CPT | Performed by: STUDENT IN AN ORGANIZED HEALTH CARE EDUCATION/TRAINING PROGRAM

## 2023-07-13 PROCEDURE — G8427 DOCREV CUR MEDS BY ELIG CLIN: HCPCS | Performed by: STUDENT IN AN ORGANIZED HEALTH CARE EDUCATION/TRAINING PROGRAM

## 2023-07-13 PROCEDURE — 1123F ACP DISCUSS/DSCN MKR DOCD: CPT | Performed by: STUDENT IN AN ORGANIZED HEALTH CARE EDUCATION/TRAINING PROGRAM

## 2023-07-13 PROCEDURE — G8420 CALC BMI NORM PARAMETERS: HCPCS | Performed by: STUDENT IN AN ORGANIZED HEALTH CARE EDUCATION/TRAINING PROGRAM

## 2023-07-13 RX ORDER — METHYLPREDNISOLONE 8 MG/1
TABLET ORAL
COMMUNITY
Start: 2023-07-12

## 2023-07-13 RX ORDER — UREA 10 %
2 LOTION (ML) TOPICAL DAILY
COMMUNITY

## 2023-07-13 SDOH — ECONOMIC STABILITY: INCOME INSECURITY: HOW HARD IS IT FOR YOU TO PAY FOR THE VERY BASICS LIKE FOOD, HOUSING, MEDICAL CARE, AND HEATING?: NOT HARD AT ALL

## 2023-07-13 SDOH — ECONOMIC STABILITY: FOOD INSECURITY: WITHIN THE PAST 12 MONTHS, YOU WORRIED THAT YOUR FOOD WOULD RUN OUT BEFORE YOU GOT MONEY TO BUY MORE.: NEVER TRUE

## 2023-07-13 SDOH — ECONOMIC STABILITY: FOOD INSECURITY: WITHIN THE PAST 12 MONTHS, THE FOOD YOU BOUGHT JUST DIDN'T LAST AND YOU DIDN'T HAVE MONEY TO GET MORE.: NEVER TRUE

## 2023-07-13 SDOH — ECONOMIC STABILITY: HOUSING INSECURITY
IN THE LAST 12 MONTHS, WAS THERE A TIME WHEN YOU DID NOT HAVE A STEADY PLACE TO SLEEP OR SLEPT IN A SHELTER (INCLUDING NOW)?: NO

## 2023-07-13 ASSESSMENT — PATIENT HEALTH QUESTIONNAIRE - PHQ9
SUM OF ALL RESPONSES TO PHQ9 QUESTIONS 1 & 2: 2
1. LITTLE INTEREST OR PLEASURE IN DOING THINGS: 1
2. FEELING DOWN, DEPRESSED OR HOPELESS: 1
SUM OF ALL RESPONSES TO PHQ QUESTIONS 1-9: 2

## 2023-07-13 NOTE — PROGRESS NOTES
1. \"Have you been to the ER, urgent care clinic since your last visit? Hospitalized since your last visit? \" No    2. \"Have you seen or consulted any other health care providers outside of the [address] [place] [address] since your last visit? \" No     3. For patients aged 43-73: Has the patient had a colonoscopy / FIT/ Cologuard? Yes - no Care Gap present      If the patient is female:    4. For patients aged 43-66: Has the patient had a mammogram within the past 2 years? NA - based on age or sex      11. For patients aged 21-65: Has the patient had a pap smear?  NA - based on age or sex
proofreading.

## 2023-07-14 LAB
ALBUMIN SERPL-MCNC: 3.7 G/DL (ref 3.5–5)
ALBUMIN/GLOB SERPL: 1.3 (ref 1.1–2.2)
ALP SERPL-CCNC: 46 U/L (ref 45–117)
ALT SERPL-CCNC: 22 U/L (ref 12–78)
ANION GAP SERPL CALC-SCNC: 2 MMOL/L (ref 5–15)
AST SERPL-CCNC: 15 U/L (ref 15–37)
BASOPHILS # BLD: 0.1 K/UL (ref 0–0.1)
BASOPHILS NFR BLD: 1 % (ref 0–1)
BILIRUB SERPL-MCNC: 0.6 MG/DL (ref 0.2–1)
BUN SERPL-MCNC: 15 MG/DL (ref 6–20)
BUN/CREAT SERPL: 16 (ref 12–20)
CALCIUM SERPL-MCNC: 10.1 MG/DL (ref 8.5–10.1)
CHLORIDE SERPL-SCNC: 105 MMOL/L (ref 97–108)
CO2 SERPL-SCNC: 33 MMOL/L (ref 21–32)
CREAT SERPL-MCNC: 0.92 MG/DL (ref 0.7–1.3)
DIFFERENTIAL METHOD BLD: ABNORMAL
EOSINOPHIL # BLD: 0.1 K/UL (ref 0–0.4)
EOSINOPHIL NFR BLD: 2 % (ref 0–7)
ERYTHROCYTE [DISTWIDTH] IN BLOOD BY AUTOMATED COUNT: 14.8 % (ref 11.5–14.5)
GLOBULIN SER CALC-MCNC: 2.9 G/DL (ref 2–4)
GLUCOSE SERPL-MCNC: 98 MG/DL (ref 65–100)
HCT VFR BLD AUTO: 43.5 % (ref 36.6–50.3)
HGB BLD-MCNC: 14.1 G/DL (ref 12.1–17)
IMM GRANULOCYTES # BLD AUTO: 0.1 K/UL (ref 0–0.04)
IMM GRANULOCYTES NFR BLD AUTO: 1 % (ref 0–0.5)
LYMPHOCYTES # BLD: 0.7 K/UL (ref 0.8–3.5)
LYMPHOCYTES NFR BLD: 11 % (ref 12–49)
MCH RBC QN AUTO: 29.8 PG (ref 26–34)
MCHC RBC AUTO-ENTMCNC: 32.4 G/DL (ref 30–36.5)
MCV RBC AUTO: 92 FL (ref 80–99)
MONOCYTES # BLD: 0.5 K/UL (ref 0–1)
MONOCYTES NFR BLD: 7 % (ref 5–13)
NEUTS SEG # BLD: 5.3 K/UL (ref 1.8–8)
NEUTS SEG NFR BLD: 78 % (ref 32–75)
NRBC # BLD: 0 K/UL (ref 0–0.01)
NRBC BLD-RTO: 0 PER 100 WBC
PLATELET # BLD AUTO: 157 K/UL (ref 150–400)
PMV BLD AUTO: 10.3 FL (ref 8.9–12.9)
POTASSIUM SERPL-SCNC: 4.4 MMOL/L (ref 3.5–5.1)
PROT SERPL-MCNC: 6.6 G/DL (ref 6.4–8.2)
RBC # BLD AUTO: 4.73 M/UL (ref 4.1–5.7)
RBC MORPH BLD: ABNORMAL
SODIUM SERPL-SCNC: 140 MMOL/L (ref 136–145)
WBC # BLD AUTO: 6.8 K/UL (ref 4.1–11.1)

## 2023-09-18 DIAGNOSIS — I10 ESSENTIAL HYPERTENSION: Primary | ICD-10-CM

## 2023-09-18 NOTE — TELEPHONE ENCOUNTER
Needs dr Davy Castro to take over from previous pcp      Caller requests Refill of:  amLODIPine (NORVASC) 2.5 MG tablet      Please send to:  [address] [facility] [phone] [address] [facility], [address] [place], [place] [address]marielle Quirozd [phone] Shirley Butts [phone]          Visit / Appointment History:  Future Appointments:  Future Appointments   Date Time Provider [address]  [address]   1/15/2024 10:30 AM Gerber Torres MD [facility] BS AMB         Last Appointment With Me:  7/13/2023         Caller confirmed instructions and dosages as correct. Caller was advised that Meds will be refilled as soon as possible, however there can be a 48-72 business hour turn around on refill requests.

## 2023-09-19 RX ORDER — AMLODIPINE BESYLATE 2.5 MG/1
2.5 TABLET ORAL DAILY
Qty: 90 TABLET | Refills: 1 | Status: SHIPPED | OUTPATIENT
Start: 2023-09-19 | End: 2024-09-13

## 2023-09-28 ENCOUNTER — NURSE ONLY (OUTPATIENT)
Age: 83
End: 2023-09-28
Payer: MEDICARE

## 2023-09-28 DIAGNOSIS — Z23 NEEDS FLU SHOT: Primary | ICD-10-CM

## 2023-09-28 PROCEDURE — 90694 VACC AIIV4 NO PRSRV 0.5ML IM: CPT | Performed by: STUDENT IN AN ORGANIZED HEALTH CARE EDUCATION/TRAINING PROGRAM

## 2023-09-28 PROCEDURE — PBSHW INFLUENZA, FLUAD, (AGE 65 Y+), IM, PF, 0.5 ML: Performed by: STUDENT IN AN ORGANIZED HEALTH CARE EDUCATION/TRAINING PROGRAM

## 2023-10-23 ENCOUNTER — TELEPHONE (OUTPATIENT)
Age: 83
End: 2023-10-23

## 2023-10-23 DIAGNOSIS — Z23 ENCOUNTER FOR ADMINISTRATION OF VACCINE: Primary | ICD-10-CM

## 2023-10-23 DIAGNOSIS — M79.671 PAIN IN BOTH FEET: ICD-10-CM

## 2023-10-23 DIAGNOSIS — M79.672 PAIN IN BOTH FEET: ICD-10-CM

## 2023-10-24 RX ORDER — GABAPENTIN 100 MG/1
100 CAPSULE ORAL 2 TIMES DAILY
Qty: 60 CAPSULE | Refills: 2 | Status: SHIPPED | OUTPATIENT
Start: 2023-10-24 | End: 2024-01-22

## 2023-10-24 NOTE — TELEPHONE ENCOUNTER
Discussed bilateral foot pain, try gabapentin 100 mg bid. Pt would also like to discuss colon cancer screening at next visit. 1. Encounter for administration of vaccine  -     respiratory syncytial vaccine, adjuvanted (AREXVY) 120 MCG/0.5ML injection; Inject 0.5 mLs into the muscle once for 1 dose, Disp-0.5 mL, R-0Normal  2. Pain in both feet  -     gabapentin (NEURONTIN) 100 MG capsule; Take 1 capsule by mouth 2 times daily for 90 days.  Intended supply: 30 days Max Daily Amount: 200 mg, Disp-60 capsule, R-2Normal

## 2023-11-08 ENCOUNTER — HOSPITAL ENCOUNTER (OUTPATIENT)
Facility: HOSPITAL | Age: 83
Discharge: HOME | End: 2023-11-11
Payer: MEDICARE

## 2023-11-08 ENCOUNTER — TRANSCRIBE ORDERS (OUTPATIENT)
Facility: HOSPITAL | Age: 83
End: 2023-11-08

## 2023-11-08 DIAGNOSIS — C61 CARCINOMA OF PROSTATE (HCC): ICD-10-CM

## 2023-11-08 DIAGNOSIS — R35.0 FREQUENCY OF URINATION: ICD-10-CM

## 2023-11-08 DIAGNOSIS — C64.9 CANCER OF KIDNEY, UNSPECIFIED LATERALITY (HCC): ICD-10-CM

## 2023-11-08 DIAGNOSIS — N40.0 BENIGN PROSTATIC HYPERPLASIA WITHOUT LOWER URINARY TRACT SYMPTOMS: ICD-10-CM

## 2023-11-08 DIAGNOSIS — R97.20 ELEVATED PROSTATE SPECIFIC ANTIGEN (PSA): ICD-10-CM

## 2023-11-08 DIAGNOSIS — R35.0 FREQUENCY OF URINATION: Primary | ICD-10-CM

## 2023-11-08 DIAGNOSIS — Z85.528 HISTORY OF RENAL CARCINOMA: ICD-10-CM

## 2023-11-08 PROCEDURE — 71046 X-RAY EXAM CHEST 2 VIEWS: CPT

## 2023-11-10 RX ORDER — PRAVASTATIN SODIUM 40 MG
40 TABLET ORAL DAILY
Qty: 100 TABLET | Refills: 3 | Status: SHIPPED | OUTPATIENT
Start: 2023-11-10

## 2023-12-05 NOTE — MR AVS SNAPSHOT
[address] [place] [address] [address] [address] [phone] Patient: Elizabeth Lundberg MRN: [id number] UOX:[birth year] Visit Information Date & Time Provider Department Dept. Phone Encounter #  
 1/22/2018 10:30 AM Jermain BlevinsCorazon 26 [phone] [id number] Follow-up Instructions Return in about 6 months (around 7/22/2018). Follow-up and Disposition History Upcoming Health Maintenance Date Due DTaP/Tdap/Td series (1 - Tdap) 9/5/1961 ZOSTER VACCINE AGE 60> 7/5/2000 GLAUCOMA SCREENING Q2Y 9/5/2005 MEDICARE YEARLY EXAM 9/5/2005 Pneumococcal 65+ High/Highest Risk (2 of 2 - PPSV23) 12/14/2015 Allergies as of 1/22/2018  Review Complete On: 1/22/2018 By: Jermain Blevins MD  
  
 Severity Noted Reaction Type Reactions Quinolones  10/03/2017    Unknown (comments) Current Immunizations  Reviewed on 1/22/2018 Name Date Influenza High Dose Vaccine PF 9/22/2017 Influenza Vaccine 10/26/2016, 10/19/2015, 9/25/2014 Pneumococcal Conjugate (PCV-13) 10/19/2015 Pneumococcal Polysaccharide (PPSV-23) 12/1/2005 Reviewed by Duran Gloria on 1/22/2018 at 10:33 AM  
You Were Diagnosed With   
  
 Codes Comments Hypercholesterolemia    -  Primary ICD-10-CM: E78.00 ICD-9-CM: 272.0 Long-term use of high-risk medication     ICD-10-CM: Z79.899 ICD-9-CM: V58.69 Factor V Leiden mutation [facility])     ICD-10-CM: H84.12 
ICD-9-CM: 289.81 Long-term (current) use of anticoagulants     ICD-10-CM: Z79.01 
ICD-9-CM: V58.61 Osteopenia, unspecified location     ICD-10-CM: M85.80 ICD-9-CM: 733.90 Vitamin D deficiency     ICD-10-CM: E55.9 ICD-9-CM: 268.9 Prostatic cancer [facility])     ICD-10-CM: U30 ICD-9-CM: 225 Renal cell carcinoma, unspecified laterality ([facility] [address]     ICD-10-CM: C64.9 ICD-9-CM: 189. 0 Vitals Please bring all medicines, vitamins, and herbal supplements with you when you come to the office.    Prescriptions will not be filled unless you are compliant with your follow up appointments or have a follow up appointment scheduled as per instruction of your physician. Refills should be requested at the time of your visit.     EKG done in office today     Fall Prevention Education Given     PLAN:   Through informed decision making process incorporating patients unique circumstances, the following treatment plan will be initiated:    1.  Prescription drug management of cardiovascular medication for efficacy, adherence to treatment, side effect assessment and polypharmacy. Current treatment clinically warranted and to continue without modifications.    2. Return for follow-up; in the interim, contact the office if new symptoms arise.  Dr. Fish as scheduled   BP Pulse Height(growth percentile) Weight(growth percentile) SpO2 BMI  
 120/72 (BP 1 Location: Left arm, BP Patient Position: Sitting) 70 6' 1\" (1.854 m) 181 lb 9.6 oz (82.4 kg) 96% 23.96 kg/m2 Smoking Status Never Smoker BMI and BSA Data Body Mass Index Body Surface Area  
 23.96 kg/m 2 2.06 m 2 Preferred Pharmacy Pharmacy Name Phone MATILDE DENISE [facility] [address], [address] [place] [address] Princess Dumas [phone] Your Updated Medication List  
  
   
This list is accurate as of: 1/22/18 10:58 AM.  Always use your most recent med list.  
  
  
  
  
 BONIVA 150 mg tablet Generic drug:  ibandronate Take 150 mg by mouth every thirty (30) days. CALCIUM 600 + D 600-125 mg-unit Tab Generic drug:  calcium-cholecalciferol (d3) Take 1 tablet by mouth daily. calcium carbonate 200 mg calcium (500 mg) Chew Commonly known as:  TUMS Take 2 tablets by mouth daily. Cholecalciferol (Vitamin D3) 3,000 unit Tab Take 1 tablet by mouth daily. clobetasol 0.05 % topical cream  
Commonly known as:  Rexie Binder Apply  to affected area two (2) times a day. diphenhydrAMINE 25 mg capsule Commonly known as:  BENADRYL Take 25 mg by mouth every six (6) hours as needed for Itching or Sleep (allergies). pravastatin 10 mg tablet Commonly known as:  PRAVACHOL Take 40 mg by mouth nightly. Indications: HYPERCHOLESTEROLEMIA  
  
 sildenafil (antihypertensive) 20 mg tablet Commonly known as:  REVATIO Take 20 mg by mouth three (3) times daily. TYLENOL EXTRA STRENGTH 500 mg tablet Generic drug:  acetaminophen Take 1,000 mg by mouth every six (6) hours as needed for Pain. VOLTAREN 1 % Gel Generic drug:  diclofenac Apply  to affected area four (4) times daily. XARELTO 20 mg Tab tablet Generic drug:  rivaroxaban TAKE ONE TABLET BY MOUTH DAILY We Performed the Following AMB POC CK (CPK) [80408 CPT(R)] AMB POC COMPLETE CBC,AUTOMATED ENTER Y0628212 CPT(R)] AMB POC COMPREHENSIVE METABOLIC PANEL [73119 CPT(R)] AMB POC LIPID PROFILE [55574 CPT(R)] AMB POC TSH [14800 CPT(R)] AMB POC URINALYSIS DIP STICK AUTO W/ MICRO  [05030 CPT(R)] AMB POC VITAMIN D [64665 CPT(R)] RI COLLECTION VENOUS BLOOD,VENIPUNCTURE [id number] CPT(R)] Follow-up Instructions Return in about 6 months (around 7/22/2018). Patient Instructions Clotting Factor Deficiencies: Care Instructions Your Care Instructions Clotting factors are substances in the blood that help stop bleeding after a cut or injury. They also prevent sudden bleeding. In people who have clotting factor problems, the clotting factors don't work right or, in some cases, are missing. When blood does not clot well, even minor injuries can cause serious bleeding. This can lead to blood loss, injury to internal organs, or damage to muscles or joints. Several conditions, including hemophilia, can make it hard for the blood to clot. Your doctor can treat you by giving you replacement clotting factors. You also may take medicine to prevent bleeding. You may often have clotting factors transfused into a vein to prevent bleeding, or you may get them as needed. You may eventually learn to do this at home. You can also try to prevent injuries that can cause you to bleed. Follow-up care is a key part of your treatment and safety. Be sure to make and go to all appointments, and call your doctor if you are having problems. It's also a good idea to know your test results and keep a list of the medicines you take. How can you care for yourself at home? · Take your medicines exactly as prescribed. Call your doctor if you think you are having a problem with your medicine. You will get more details on the specific medicines your doctor prescribes. · Stay at a healthy body weight. If you are overweight, the additional stress on joints can trigger bleeding. · Exercise safely. Avoid contact sports. Swim or walk to avoid excess pressure on your joints. Check with your doctor before doing activities that put you at high risk for falls, such as riding a bike. · Brush and floss your teeth daily. This may help you avoid problems that could lead to having a tooth pulled. · Avoid aspirin and nonsteroidal anti-inflammatory drugs (NSAIDs), such as ibuprofen (Advil, Motrin) or naproxen (Aleve). They can increase the chance of bleeding. · Take pain medicines exactly as directed. ¨ If the doctor gave you a prescription medicine for pain, take it as prescribed. ¨ If you are not taking a prescription pain medicine, ask your doctor if you can take an over-the-counter medicine. · Take care to prevent accidents at home: ¨ Make sure rugs are tacked down so you do not slip. ¨ Keep furniture with sharp edges out of pathways. ¨ Use nonskid floor wax. ¨ Wipe up spills quickly. ¨ If you live in an area that gets snow and ice in the winter, sprinkle salt on steps and sidewalks. ¨ Avoid loose-fitting shoes. You might lose your balance and fall. · Wear medical alert jewelry that lists your clotting problem. You can buy this at most drugstores. When should you call for help? Call 911 anytime you think you may need emergency care. For example, call if: 
? · You passed out (lost consciousness). ? · You have signs of severe bleeding, which includes: 
¨ You have a severe headache that is different from past headaches. ¨ You vomit blood or what looks like coffee grounds. ¨ Your stools are maroon or very bloody. ?Call your doctor now or seek immediate medical care if: 
? · You are dizzy or lightheaded, or you feel like you may faint. ? · You have abnormal bleeding, such as: 
¨ Your stools are black and look like tar, or they have streaks of blood. ¨ You have blood in your urine. ¨ You have joint pain. ¨ You have bruises or blood spots under your skin. ? Watch closely for changes in your health, and be sure to contact your doctor if: 
? · You do not get better as expected. Where can you learn more? Go to http://kenna-lisa.info/. Enter V383 in the search box to learn more about \"Clotting Factor Deficiencies: Care Instructions. \" Current as of: October 13, 2016 Content Version: 11.4 © 3597-0478 [employer]. Care instructions adapted under license by [employer] (which disclaims liability or warranty for this information). If you have questions about a medical condition or this instruction, always ask your healthcare professional. [address] any warranty or liability for your use of this information. Patient Instructions History Introducing \[facility] [place] [facility]\"" & HEALTH SERVICES! Jacob Mayo introduces TrackIF patient portal. Now you can access parts of your medical record, email your doctor's office, and request medication refills online. 1. In your internet browser, go to [email or web address] 2. Click on the First Time User? Click Here link in the Sign In box. You will see the New Member Sign Up page. 3. Enter your TrackIF Access Code exactly as it appears below. You will not need to use this code after youve completed the sign-up process. If you do not sign up before the expiration date, you must request a new code. · TrackIF Access Code: [email or web address] Expires: 4/22/2018 10:58 AM 
 
4. Enter the last four digits of your Social Security Number (xxxx) and Date of Birth (mm/dd/yyyy) as indicated and click Submit. You will be taken to the next sign-up page. 5. Create a TrackIF ID. This will be your TrackIF login ID and cannot be changed, so think of one that is secure and easy to remember. 6. Create a [employer] password. You can change your password at any time. 7. Enter your Password Reset Question and Answer. This can be used at a later time if you forget your password. 8. Enter your e-mail address. You will receive e-mail notification when new information is available in [address] 9. Click Sign Up. You can now view and download portions of your medical record. 10. Click the Download Summary menu link to download a portable copy of your medical information. If you have questions, please visit the Frequently Asked Questions section of the [employer] website. Remember, [employer] is NOT to be used for urgent needs. For medical emergencies, dial 911. Now available from your iPhone and Android! Please provide this summary of care documentation to your next provider. Your primary care clinician is listed as [address]. If you have any questions after today's visit, please call [phone].

## 2023-12-28 ENCOUNTER — OFFICE VISIT (OUTPATIENT)
Age: 83
End: 2023-12-28
Payer: MEDICARE

## 2023-12-28 VITALS
OXYGEN SATURATION: 97 % | HEIGHT: 72 IN | HEART RATE: 71 BPM | TEMPERATURE: 98.2 F | WEIGHT: 176.6 LBS | SYSTOLIC BLOOD PRESSURE: 156 MMHG | RESPIRATION RATE: 14 BRPM | DIASTOLIC BLOOD PRESSURE: 89 MMHG | BODY MASS INDEX: 23.92 KG/M2

## 2023-12-28 DIAGNOSIS — R41.3 MEMORY LOSS: ICD-10-CM

## 2023-12-28 DIAGNOSIS — M79.672 PAIN IN BOTH FEET: Primary | ICD-10-CM

## 2023-12-28 DIAGNOSIS — M79.671 PAIN IN BOTH FEET: Primary | ICD-10-CM

## 2023-12-28 DIAGNOSIS — R06.09 DYSPNEA ON EXERTION: ICD-10-CM

## 2023-12-28 DIAGNOSIS — R53.83 FATIGUE, UNSPECIFIED TYPE: ICD-10-CM

## 2023-12-28 PROCEDURE — 1036F TOBACCO NON-USER: CPT | Performed by: STUDENT IN AN ORGANIZED HEALTH CARE EDUCATION/TRAINING PROGRAM

## 2023-12-28 PROCEDURE — 93005 ELECTROCARDIOGRAM TRACING: CPT | Performed by: STUDENT IN AN ORGANIZED HEALTH CARE EDUCATION/TRAINING PROGRAM

## 2023-12-28 PROCEDURE — 99214 OFFICE O/P EST MOD 30 MIN: CPT | Performed by: STUDENT IN AN ORGANIZED HEALTH CARE EDUCATION/TRAINING PROGRAM

## 2023-12-28 PROCEDURE — 1123F ACP DISCUSS/DSCN MKR DOCD: CPT | Performed by: STUDENT IN AN ORGANIZED HEALTH CARE EDUCATION/TRAINING PROGRAM

## 2023-12-28 PROCEDURE — G8420 CALC BMI NORM PARAMETERS: HCPCS | Performed by: STUDENT IN AN ORGANIZED HEALTH CARE EDUCATION/TRAINING PROGRAM

## 2023-12-28 PROCEDURE — 93010 ELECTROCARDIOGRAM REPORT: CPT | Performed by: STUDENT IN AN ORGANIZED HEALTH CARE EDUCATION/TRAINING PROGRAM

## 2023-12-28 PROCEDURE — G8427 DOCREV CUR MEDS BY ELIG CLIN: HCPCS | Performed by: STUDENT IN AN ORGANIZED HEALTH CARE EDUCATION/TRAINING PROGRAM

## 2023-12-28 PROCEDURE — G8484 FLU IMMUNIZE NO ADMIN: HCPCS | Performed by: STUDENT IN AN ORGANIZED HEALTH CARE EDUCATION/TRAINING PROGRAM

## 2023-12-28 RX ORDER — LANOLIN ALCOHOL/MO/W.PET/CERES
1 CREAM (GRAM) TOPICAL
COMMUNITY

## 2023-12-28 RX ORDER — DOCUSATE SODIUM 100 MG/1
100 CAPSULE, LIQUID FILLED ORAL 2 TIMES DAILY
COMMUNITY

## 2023-12-28 RX ORDER — GABAPENTIN 300 MG/1
300 CAPSULE ORAL 2 TIMES DAILY
Qty: 60 CAPSULE | Refills: 5 | Status: SHIPPED | OUTPATIENT
Start: 2023-12-28 | End: 2024-06-25

## 2023-12-28 RX ORDER — MULTIVIT WITH MINERALS/LUTEIN
500 TABLET ORAL
COMMUNITY

## 2023-12-29 LAB
ALBUMIN SERPL-MCNC: 3.8 G/DL (ref 3.5–5)
ALBUMIN/GLOB SERPL: 1.5 (ref 1.1–2.2)
ALP SERPL-CCNC: 49 U/L (ref 45–117)
ALT SERPL-CCNC: 22 U/L (ref 12–78)
ANION GAP SERPL CALC-SCNC: 3 MMOL/L (ref 5–15)
AST SERPL-CCNC: 18 U/L (ref 15–37)
BASOPHILS # BLD: 0 K/UL (ref 0–0.1)
BASOPHILS NFR BLD: 1 % (ref 0–1)
BILIRUB SERPL-MCNC: 0.6 MG/DL (ref 0.2–1)
BUN SERPL-MCNC: 20 MG/DL (ref 6–20)
BUN/CREAT SERPL: 18 (ref 12–20)
CALCIUM SERPL-MCNC: 9.6 MG/DL (ref 8.5–10.1)
CHLORIDE SERPL-SCNC: 105 MMOL/L (ref 97–108)
CO2 SERPL-SCNC: 33 MMOL/L (ref 21–32)
CREAT SERPL-MCNC: 1.12 MG/DL (ref 0.7–1.3)
DIFFERENTIAL METHOD BLD: ABNORMAL
EOSINOPHIL # BLD: 0 K/UL (ref 0–0.4)
EOSINOPHIL NFR BLD: 1 % (ref 0–7)
ERYTHROCYTE [DISTWIDTH] IN BLOOD BY AUTOMATED COUNT: 15.4 % (ref 11.5–14.5)
GLOBULIN SER CALC-MCNC: 2.6 G/DL (ref 2–4)
GLUCOSE SERPL-MCNC: 103 MG/DL (ref 65–100)
HCT VFR BLD AUTO: 43.5 % (ref 36.6–50.3)
HGB BLD-MCNC: 14 G/DL (ref 12.1–17)
IMM GRANULOCYTES # BLD AUTO: 0.1 K/UL (ref 0–0.04)
IMM GRANULOCYTES NFR BLD AUTO: 1 % (ref 0–0.5)
LYMPHOCYTES # BLD: 1.1 K/UL (ref 0.8–3.5)
LYMPHOCYTES NFR BLD: 16 % (ref 12–49)
MCH RBC QN AUTO: 29.4 PG (ref 26–34)
MCHC RBC AUTO-ENTMCNC: 32.2 G/DL (ref 30–36.5)
MCV RBC AUTO: 91.2 FL (ref 80–99)
MONOCYTES # BLD: 0.7 K/UL (ref 0–1)
MONOCYTES NFR BLD: 10 % (ref 5–13)
NEUTS SEG # BLD: 5.2 K/UL (ref 1.8–8)
NEUTS SEG NFR BLD: 71 % (ref 32–75)
NRBC # BLD: 0 K/UL (ref 0–0.01)
NRBC BLD-RTO: 0 PER 100 WBC
PLATELET # BLD AUTO: 157 K/UL (ref 150–400)
PMV BLD AUTO: 9.9 FL (ref 8.9–12.9)
POTASSIUM SERPL-SCNC: 4.4 MMOL/L (ref 3.5–5.1)
PROT SERPL-MCNC: 6.4 G/DL (ref 6.4–8.2)
RBC # BLD AUTO: 4.77 M/UL (ref 4.1–5.7)
SODIUM SERPL-SCNC: 141 MMOL/L (ref 136–145)
TSH SERPL DL<=0.05 MIU/L-ACNC: 2.21 UIU/ML (ref 0.36–3.74)
WBC # BLD AUTO: 7.1 K/UL (ref 4.1–11.1)

## 2024-01-02 ENCOUNTER — TELEPHONE (OUTPATIENT)
Age: 84
End: 2024-01-02

## 2024-01-12 ENCOUNTER — HOSPITAL ENCOUNTER (OUTPATIENT)
Facility: HOSPITAL | Age: 84
End: 2024-01-12
Attending: STUDENT IN AN ORGANIZED HEALTH CARE EDUCATION/TRAINING PROGRAM
Payer: MEDICARE

## 2024-01-12 ENCOUNTER — TELEPHONE (OUTPATIENT)
Age: 84
End: 2024-01-12

## 2024-01-12 VITALS — HEIGHT: 72 IN | BODY MASS INDEX: 23.92 KG/M2 | WEIGHT: 176.59 LBS

## 2024-01-12 DIAGNOSIS — R06.09 DYSPNEA ON EXERTION: ICD-10-CM

## 2024-01-12 DIAGNOSIS — R53.83 FATIGUE, UNSPECIFIED TYPE: ICD-10-CM

## 2024-01-12 DIAGNOSIS — R06.09 DYSPNEA ON EXERTION: Primary | ICD-10-CM

## 2024-01-12 LAB
ECHO AO ROOT DIAM: 3.8 CM
ECHO AO ROOT INDEX: 1.88 CM/M2
ECHO AV AREA PEAK VELOCITY: 3.3 CM2
ECHO AV AREA/BSA PEAK VELOCITY: 1.6 CM2/M2
ECHO AV PEAK GRADIENT: 6 MMHG
ECHO AV PEAK VELOCITY: 1.3 M/S
ECHO AV VELOCITY RATIO: 0.77
ECHO BSA: 2.02 M2
ECHO LA DIAMETER INDEX: 1.83 CM/M2
ECHO LA DIAMETER: 3.7 CM
ECHO LA TO AORTIC ROOT RATIO: 0.97
ECHO LA VOL A-L A2C: 73 ML (ref 18–58)
ECHO LA VOL A-L A4C: 49 ML (ref 18–58)
ECHO LA VOL MOD A2C: 69 ML (ref 18–58)
ECHO LA VOL MOD A4C: 46 ML (ref 18–58)
ECHO LA VOLUME AREA LENGTH: 65 ML
ECHO LA VOLUME INDEX A-L A2C: 36 ML/M2 (ref 16–34)
ECHO LA VOLUME INDEX A-L A4C: 24 ML/M2 (ref 16–34)
ECHO LA VOLUME INDEX AREA LENGTH: 32 ML/M2 (ref 16–34)
ECHO LA VOLUME INDEX MOD A2C: 34 ML/M2 (ref 16–34)
ECHO LA VOLUME INDEX MOD A4C: 23 ML/M2 (ref 16–34)
ECHO LV E' LATERAL VELOCITY: 6 CM/S
ECHO LV E' SEPTAL VELOCITY: 7 CM/S
ECHO LV FRACTIONAL SHORTENING: 32 % (ref 28–44)
ECHO LV INTERNAL DIMENSION DIASTOLE INDEX: 2.03 CM/M2
ECHO LV INTERNAL DIMENSION DIASTOLIC: 4.1 CM (ref 4.2–5.9)
ECHO LV INTERNAL DIMENSION SYSTOLIC INDEX: 1.39 CM/M2
ECHO LV INTERNAL DIMENSION SYSTOLIC: 2.8 CM
ECHO LV IVSD: 1.1 CM (ref 0.6–1)
ECHO LV MASS 2D: 151.3 G (ref 88–224)
ECHO LV MASS INDEX 2D: 74.9 G/M2 (ref 49–115)
ECHO LV POSTERIOR WALL DIASTOLIC: 1.1 CM (ref 0.6–1)
ECHO LV RELATIVE WALL THICKNESS RATIO: 0.54
ECHO LVOT AREA: 4.2 CM2
ECHO LVOT DIAM: 2.3 CM
ECHO LVOT PEAK GRADIENT: 4 MMHG
ECHO LVOT PEAK VELOCITY: 1 M/S
ECHO MV A VELOCITY: 0.79 M/S
ECHO MV E DECELERATION TIME (DT): 202.7 MS
ECHO MV E VELOCITY: 0.67 M/S
ECHO MV E/A RATIO: 0.85
ECHO MV E/E' LATERAL: 11.17
ECHO MV E/E' RATIO (AVERAGED): 10.37
ECHO RA VOLUME: 40 ML
ECHO RA VOLUME: 43 ML
ECHO RV TAPSE: 2.3 CM (ref 1.7–?)
ECHO TV REGURGITANT MAX VELOCITY: 2.17 M/S
ECHO TV REGURGITANT PEAK GRADIENT: 19 MMHG

## 2024-01-12 PROCEDURE — 93306 TTE W/DOPPLER COMPLETE: CPT

## 2024-01-12 NOTE — TELEPHONE ENCOUNTER
BS states pt has appt on Monday for PFT complete with bronch.    They will need an order put in system for this.    Please put in today

## 2024-01-15 ENCOUNTER — HOSPITAL ENCOUNTER (OUTPATIENT)
Facility: HOSPITAL | Age: 84
Discharge: HOME | End: 2024-01-18
Attending: STUDENT IN AN ORGANIZED HEALTH CARE EDUCATION/TRAINING PROGRAM
Payer: MEDICARE

## 2024-01-15 DIAGNOSIS — R06.09 DYSPNEA ON EXERTION: ICD-10-CM

## 2024-01-15 DIAGNOSIS — R53.83 FATIGUE, UNSPECIFIED TYPE: ICD-10-CM

## 2024-01-15 PROCEDURE — 94010 BREATHING CAPACITY TEST: CPT

## 2024-01-15 PROCEDURE — 94726 PLETHYSMOGRAPHY LUNG VOLUMES: CPT

## 2024-01-15 PROCEDURE — 94729 DIFFUSING CAPACITY: CPT

## 2024-01-19 NOTE — PROCEDURES
Pulmonary Function Test Report      PATIENT ID: Gunnar Lizarraga  MRN: [id number]   YOB: 1940    DATE OF ADMISSION: [id number]    PRIMARY CARE PROVIDER: Milton Hubbard MD   DATE OF SERVICE - 01/19/24    Spirometry:  Spirometry is normal.    Bronchodilator response:  Bronchodilator test not performed.    Volumes:  Air trapping is suggested by the increased residual volume.    Diffusion:  Diffusing capacity is normal.    Flow-Volume:  The flow-volume loop is normal.      Signed:   Michael Bojorquez MD  1/19/2024  10:26 AM

## 2024-01-23 ENCOUNTER — NURSE ONLY (OUTPATIENT)
Age: 84
End: 2024-01-23

## 2024-01-23 VITALS
SYSTOLIC BLOOD PRESSURE: 131 MMHG | DIASTOLIC BLOOD PRESSURE: 80 MMHG | WEIGHT: 179.6 LBS | HEART RATE: 64 BPM | BODY MASS INDEX: 24.35 KG/M2

## 2024-03-25 DIAGNOSIS — I10 ESSENTIAL HYPERTENSION: ICD-10-CM

## 2024-03-25 RX ORDER — AMLODIPINE BESYLATE 2.5 MG/1
2.5 TABLET ORAL DAILY
Qty: 90 TABLET | Refills: 0 | Status: SHIPPED | OUTPATIENT
Start: 2024-03-25 | End: 2024-09-21

## 2024-04-01 NOTE — PROGRESS NOTES
multitasking, and word-finding difficulties. Mr. Lizarraga hopes that this evaluation will inform him whether he is in the early stages of a dementia process or whether his perceived difficulties are due to normal aging.     Activities of Daily Living (ADLs): Independent for basic and complex ADLs. Endorsed a general structure which he follows each day. He is still driving, mostly to familiar locations. Will use the GPS as needed. He has not gotten lost or turned around. He will also drive friends from [employer] to their doctors' appointments as needed. He manages his medications with a pillbox and is not forgetting doses. Mr. Lizarraga provided a very detailed printed list of his medical history and medications. He updates this list regularly. He is capable of preparing simple meals and is not forgetting to turn off the stove/oven. His wife has always managed the finances. Currently, he resides with his wife in their own home. There were no endorsed safety concerns. He has already designated his power of .   Current Emotional/Behavioral Functioning: Overall, his mood has been steady. No concerns for any rory symptoms of depression or anxiety. There were also no endorsed current stressors or behavioral changes. Mr. Lizarraga did not report to any current thoughts of suicide or self-harm and he has not had any perceptual disturbances (e.g., hallucinations, delusions, etc.).     Physical/Sensory/Motor Symptoms: Overall, he feels that he is in a fairly good state of health. His blood pressure fluctuates often, but he manages this with medication. He is also experiencing pain in his feet that gets worse with prolonged standing/walking (more than 10 minutes), currently under the care of a podiatrist. He has not experienced any gait imbalance or falls. He also endorsed knee pain and will be needing a knee replacement once his foot pain is resolved. No sensory changes (e.g., vision, hearing, taste, or smell). He uses glasses

## 2024-04-02 ENCOUNTER — OFFICE VISIT (OUTPATIENT)
Age: 84
End: 2024-04-02
Payer: MEDICARE

## 2024-04-02 ENCOUNTER — PROCEDURE VISIT (OUTPATIENT)
Age: 84
End: 2024-04-02
Payer: MEDICARE

## 2024-04-02 DIAGNOSIS — G31.84 MILD COGNITIVE IMPAIRMENT OF UNCERTAIN OR UNKNOWN ETIOLOGY: Primary | ICD-10-CM

## 2024-04-02 DIAGNOSIS — R41.9 COGNITIVE COMPLAINTS: Primary | ICD-10-CM

## 2024-04-02 PROCEDURE — 1123F ACP DISCUSS/DSCN MKR DOCD: CPT | Performed by: STUDENT IN AN ORGANIZED HEALTH CARE EDUCATION/TRAINING PROGRAM

## 2024-04-02 PROCEDURE — 96136 PSYCL/NRPSYC TST PHY/QHP 1ST: CPT | Performed by: STUDENT IN AN ORGANIZED HEALTH CARE EDUCATION/TRAINING PROGRAM

## 2024-04-02 PROCEDURE — 96138 PSYCL/NRPSYC TECH 1ST: CPT | Performed by: STUDENT IN AN ORGANIZED HEALTH CARE EDUCATION/TRAINING PROGRAM

## 2024-04-02 PROCEDURE — 96139 PSYCL/NRPSYC TST TECH EA: CPT | Performed by: STUDENT IN AN ORGANIZED HEALTH CARE EDUCATION/TRAINING PROGRAM

## 2024-04-02 PROCEDURE — 90791 PSYCH DIAGNOSTIC EVALUATION: CPT | Performed by: STUDENT IN AN ORGANIZED HEALTH CARE EDUCATION/TRAINING PROGRAM

## 2024-04-11 NOTE — PROGRESS NOTES
completing a task with multiple steps, write down the required steps and cross completed items of the list.   Use phone alarms/reminders for activities that are difficult to remember. Smart devices (such as an Gi) may also be helpful for setting timers and reminders.   Establish consistent routines to facilitate task completion (i.e., brush teeth immediately after getting out of bed or eating breakfast daily). Connecting tasks that are difficult to remember to already established routines may make new tasks easier to remember (i.e., take medications everyday with breakfast).   If new information does not register in conversation, politely ask your conversation partner to repeat themselves.     Mr. Lizarraga should return for neuropsychological re-evaluation in 12-18 months to track cognitive changes and update diagnostic and treatment recommendations.     Comprehensive feedback regarding the results of this neuropsychological evaluation will be provided to Mr. Lizarraga via in-person.Thank you for allowing us to participate in Mr. Lizarraga's care. If there are any questions or concerns, please contact us at [phone].       DOCUMENTATION OF SERVICE ACTIVITIES TIME SPENT   Diagnostic Interview Examination 90791 x 1 unit   Clinical interview with patient  Interview with collateral informant (if applicable) 23 minutes   Neuropsychological Test Administration by Professional 96136 x 1 unit   Administration of two or more neuropsychological tests  Scoring of neuropsychological tests 21 minutes   Neuropsychological Test Administration by Technician 96138 x 1 unit  96139 x 6 units   Administration of two or more neuropsychological tests  Scoring of neuropsychological tests 210 minutes       Farhana Rueda PsyD, KVNG  Clinical Neuropsychologist  Neurology Clinic at [facility]

## 2024-04-16 ENCOUNTER — OFFICE VISIT (OUTPATIENT)
Age: 84
End: 2024-04-16
Payer: MEDICARE

## 2024-04-16 DIAGNOSIS — G31.84 MILD COGNITIVE IMPAIRMENT OF UNCERTAIN OR UNKNOWN ETIOLOGY: Primary | ICD-10-CM

## 2024-04-16 PROCEDURE — 96132 NRPSYC TST EVAL PHYS/QHP 1ST: CPT | Performed by: STUDENT IN AN ORGANIZED HEALTH CARE EDUCATION/TRAINING PROGRAM

## 2024-04-16 PROCEDURE — 96133 NRPSYC TST EVAL PHYS/QHP EA: CPT | Performed by: STUDENT IN AN ORGANIZED HEALTH CARE EDUCATION/TRAINING PROGRAM

## 2024-05-13 ENCOUNTER — TRANSCRIBE ORDERS (OUTPATIENT)
Facility: HOSPITAL | Age: 84
End: 2024-05-13

## 2024-05-13 ENCOUNTER — HOSPITAL ENCOUNTER (OUTPATIENT)
Facility: HOSPITAL | Age: 84
Discharge: HOME | End: 2024-05-16
Payer: MEDICARE

## 2024-05-13 DIAGNOSIS — R35.0 URINARY FREQUENCY: ICD-10-CM

## 2024-05-13 DIAGNOSIS — R97.20 ELEVATED PROSTATE SPECIFIC ANTIGEN (PSA): ICD-10-CM

## 2024-05-13 DIAGNOSIS — N52.9 ERECTILE DYSFUNCTION OF ORGANIC ORIGIN: ICD-10-CM

## 2024-05-13 DIAGNOSIS — N39.41 URGE INCONTINENCE: ICD-10-CM

## 2024-05-13 DIAGNOSIS — N39.41 URGE INCONTINENCE: Primary | ICD-10-CM

## 2024-05-13 PROCEDURE — 71046 X-RAY EXAM CHEST 2 VIEWS: CPT

## 2024-05-28 ENCOUNTER — OFFICE VISIT (OUTPATIENT)
Age: 84
End: 2024-05-28
Payer: MEDICARE

## 2024-05-28 VITALS
WEIGHT: 171 LBS | HEART RATE: 62 BPM | OXYGEN SATURATION: 98 % | HEIGHT: 72 IN | SYSTOLIC BLOOD PRESSURE: 162 MMHG | BODY MASS INDEX: 23.16 KG/M2 | TEMPERATURE: 98 F | RESPIRATION RATE: 16 BRPM | DIASTOLIC BLOOD PRESSURE: 93 MMHG

## 2024-05-28 DIAGNOSIS — Z00.00 MEDICARE ANNUAL WELLNESS VISIT, SUBSEQUENT: Primary | ICD-10-CM

## 2024-05-28 DIAGNOSIS — D68.51 FACTOR V LEIDEN MUTATION (HCC): ICD-10-CM

## 2024-05-28 DIAGNOSIS — I10 ESSENTIAL HYPERTENSION: ICD-10-CM

## 2024-05-28 DIAGNOSIS — C64.1 MALIGNANT NEOPLASM OF RIGHT KIDNEY, EXCEPT RENAL PELVIS (HCC): ICD-10-CM

## 2024-05-28 DIAGNOSIS — E78.00 HYPERCHOLESTEROLEMIA: ICD-10-CM

## 2024-05-28 DIAGNOSIS — R06.09 DYSPNEA ON EXERTION: ICD-10-CM

## 2024-05-28 DIAGNOSIS — I74.9 EMBOLISM AND THROMBOSIS OF UNSPECIFIED ARTERY (HCC): ICD-10-CM

## 2024-05-28 PROCEDURE — 93010 ELECTROCARDIOGRAM REPORT: CPT | Performed by: STUDENT IN AN ORGANIZED HEALTH CARE EDUCATION/TRAINING PROGRAM

## 2024-05-28 PROCEDURE — 3077F SYST BP >= 140 MM HG: CPT | Performed by: STUDENT IN AN ORGANIZED HEALTH CARE EDUCATION/TRAINING PROGRAM

## 2024-05-28 PROCEDURE — 93005 ELECTROCARDIOGRAM TRACING: CPT | Performed by: STUDENT IN AN ORGANIZED HEALTH CARE EDUCATION/TRAINING PROGRAM

## 2024-05-28 PROCEDURE — 1123F ACP DISCUSS/DSCN MKR DOCD: CPT | Performed by: STUDENT IN AN ORGANIZED HEALTH CARE EDUCATION/TRAINING PROGRAM

## 2024-05-28 PROCEDURE — 3080F DIAST BP >= 90 MM HG: CPT | Performed by: STUDENT IN AN ORGANIZED HEALTH CARE EDUCATION/TRAINING PROGRAM

## 2024-05-28 PROCEDURE — G0439 PPPS, SUBSEQ VISIT: HCPCS | Performed by: STUDENT IN AN ORGANIZED HEALTH CARE EDUCATION/TRAINING PROGRAM

## 2024-05-28 RX ORDER — AMLODIPINE BESYLATE 5 MG/1
5 TABLET ORAL DAILY
Qty: 90 TABLET | Refills: 1 | Status: SHIPPED | OUTPATIENT
Start: 2024-05-28 | End: 2025-05-23

## 2024-05-28 ASSESSMENT — PATIENT HEALTH QUESTIONNAIRE - PHQ9
SUM OF ALL RESPONSES TO PHQ9 QUESTIONS 1 & 2: 0
1. LITTLE INTEREST OR PLEASURE IN DOING THINGS: NOT AT ALL
2. FEELING DOWN, DEPRESSED OR HOPELESS: NOT AT ALL
SUM OF ALL RESPONSES TO PHQ QUESTIONS 1-9: 0

## 2024-05-28 NOTE — PATIENT INSTRUCTIONS
attack. These may include:    Chest pain or pressure, or a strange feeling in the chest.     Sweating.     Shortness of breath.     Pain, pressure, or a strange feeling in the back, neck, jaw, or upper belly or in one or both shoulders or arms.     Lightheadedness or sudden weakness.     A fast or irregular heartbeat.   After you call 911, the  may tell you to chew 1 adult-strength or 2 to 4 low-dose aspirin. Wait for an ambulance. Do not try to drive yourself.  Watch closely for changes in your health, and be sure to contact your doctor if you have any problems.  Where can you learn more?  Go to https://www.[employer]net/patientEd and enter F075 to learn more about \"A Healthy Heart: Care Instructions.\"  Current as of: June 24, 2023               Content Version: 14.0  © 8678-0298 [employer].   Care instructions adapted under license by "[employer]. If you have questions about a medical condition or this instruction, always ask your healthcare professional. [employer] disclaims any warranty or liability for your use of this information.      Personalized Preventive Plan for Gunnar Lizarraga - 5/28/2024  Medicare offers a range of preventive health benefits. Some of the tests and screenings are paid in full while other may be subject to a deductible, co-insurance, and/or copay.    Some of these benefits include a comprehensive review of your medical history including lifestyle, illnesses that may run in your family, and various assessments and screenings as appropriate.    After reviewing your medical record and screening and assessments performed today your provider may have ordered immunizations, labs, imaging, and/or referrals for you.  A list of these orders (if applicable) as well as your Preventive Care list are included within your After Visit Summary for your review.    Other Preventive Recommendations:    A preventive eye exam performed by an eye specialist is recommended

## 2024-05-28 NOTE — PROGRESS NOTES
\"Have you been to the ER, urgent care clinic since your last visit?  Hospitalized since your last visit?\"    NO    “Have you seen or consulted any other health care providers outside of [facility] since your last visit?”    NO            Click Here for Release of Records Request  
Hypercholesterolemia  -     Lipid Panel; Future  7. Dyspnea on exertion  Assessment & Plan:  Will refer to cardiology for consideration of further cardiac workup.   Orders:  -     Dedrick Jacques MD, Cardiology, [facility] POC EKG ROUTINE       Follow up:  Return in about 6 months (around 11/28/2024) for follow up for blood pressure and shortness of breath.    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   Orders:  -     Dedrick Jacques MD, Cardiology, [facility] POC EKG ROUTINE    Recommendations for Preventive Services Due: see orders and patient instructions/AVS.  Recommended screening schedule for the next 5-10 years is provided to the patient in written form: see Patient Instructions/AVS.     No follow-ups on file.     Subjective       Patient's complete Health Risk Assessment and screening values have been reviewed and are found in Flowsheets. The following problems were reviewed today and where indicated follow up appointments were made and/or referrals ordered.    No Positive Risk Factors identified today.

## 2024-05-29 LAB
ALBUMIN SERPL-MCNC: 3.8 G/DL (ref 3.5–5)
ALBUMIN/GLOB SERPL: 1.4 (ref 1.1–2.2)
ALP SERPL-CCNC: 48 U/L (ref 45–117)
ALT SERPL-CCNC: 17 U/L (ref 12–78)
ANION GAP SERPL CALC-SCNC: 6 MMOL/L (ref 5–15)
AST SERPL-CCNC: 14 U/L (ref 15–37)
BASOPHILS # BLD: 0 K/UL (ref 0–0.1)
BASOPHILS NFR BLD: 1 % (ref 0–1)
BILIRUB SERPL-MCNC: 0.8 MG/DL (ref 0.2–1)
BUN SERPL-MCNC: 16 MG/DL (ref 6–20)
BUN/CREAT SERPL: 18 (ref 12–20)
CALCIUM SERPL-MCNC: 10.4 MG/DL (ref 8.5–10.1)
CHLORIDE SERPL-SCNC: 102 MMOL/L (ref 97–108)
CHOLEST SERPL-MCNC: 191 MG/DL
CO2 SERPL-SCNC: 29 MMOL/L (ref 21–32)
CREAT SERPL-MCNC: 0.87 MG/DL (ref 0.7–1.3)
DIFFERENTIAL METHOD BLD: ABNORMAL
EOSINOPHIL # BLD: 0.1 K/UL (ref 0–0.4)
EOSINOPHIL NFR BLD: 1 % (ref 0–7)
ERYTHROCYTE [DISTWIDTH] IN BLOOD BY AUTOMATED COUNT: 15.2 % (ref 11.5–14.5)
GLOBULIN SER CALC-MCNC: 2.7 G/DL (ref 2–4)
GLUCOSE SERPL-MCNC: 98 MG/DL (ref 65–100)
HCT VFR BLD AUTO: 44.6 % (ref 36.6–50.3)
HDLC SERPL-MCNC: 84 MG/DL
HDLC SERPL: 2.3 (ref 0–5)
HGB BLD-MCNC: 14.1 G/DL (ref 12.1–17)
IMM GRANULOCYTES # BLD AUTO: 0 K/UL (ref 0–0.04)
IMM GRANULOCYTES NFR BLD AUTO: 1 % (ref 0–0.5)
LDLC SERPL CALC-MCNC: 92.6 MG/DL (ref 0–100)
LYMPHOCYTES # BLD: 1.2 K/UL (ref 0.8–3.5)
LYMPHOCYTES NFR BLD: 18 % (ref 12–49)
MCH RBC QN AUTO: 29 PG (ref 26–34)
MCHC RBC AUTO-ENTMCNC: 31.6 G/DL (ref 30–36.5)
MCV RBC AUTO: 91.8 FL (ref 80–99)
MONOCYTES # BLD: 0.6 K/UL (ref 0–1)
MONOCYTES NFR BLD: 10 % (ref 5–13)
NEUTS SEG # BLD: 4.5 K/UL (ref 1.8–8)
NEUTS SEG NFR BLD: 69 % (ref 32–75)
NRBC # BLD: 0 K/UL (ref 0–0.01)
NRBC BLD-RTO: 0 PER 100 WBC
PLATELET # BLD AUTO: 149 K/UL (ref 150–400)
PMV BLD AUTO: 10.7 FL (ref 8.9–12.9)
POTASSIUM SERPL-SCNC: 4 MMOL/L (ref 3.5–5.1)
PROT SERPL-MCNC: 6.5 G/DL (ref 6.4–8.2)
RBC # BLD AUTO: 4.86 M/UL (ref 4.1–5.7)
SODIUM SERPL-SCNC: 137 MMOL/L (ref 136–145)
TRIGL SERPL-MCNC: 72 MG/DL
VLDLC SERPL CALC-MCNC: 14.4 MG/DL
WBC # BLD AUTO: 6.4 K/UL (ref 4.1–11.1)

## 2024-05-29 NOTE — ASSESSMENT & PLAN NOTE
Uncontrolled, changes made today: increase amlodipine to 5 mg daily, NV BP check in 2 weeks for consideration of further increase

## 2024-05-30 NOTE — RESULT ENCOUNTER NOTE
Your calcium level is high, you can stop you calcium carbonate supplement for now. Lab work is otherwise stable.

## 2024-05-31 NOTE — TELEPHONE ENCOUNTER
PCP: Milton Hubbard MD    Last appt: 5/28/2024   Future Appointments   Date Time Provider Department Center   6/11/2024 11:00 AM [facility] NURSE [facility] BS AMB   11/25/2024 11:00 AM Milton Hubbard MD [facility] BS AMB       Requested Prescriptions     Pending Prescriptions Disp Refills    rivaroxaban (XARELTO) 20 MG TABS tablet 90 tablet 3     Sig: Take 1 tablet by mouth daily

## 2024-05-31 NOTE — TELEPHONE ENCOUNTER
Pt called in requesting refill of     Xarelto 20 mg    Confirmed [facility] [address]

## 2024-06-17 ENCOUNTER — TELEPHONE (OUTPATIENT)
Age: 84
End: 2024-06-17

## 2024-06-17 NOTE — TELEPHONE ENCOUNTER
Patient states he needs a call back to get the Name of a [facility] Cardiologist. Patient states a detailed message can be left on voice mail if no answer. Please call to discuss. Thank you

## 2024-06-18 ENCOUNTER — NURSE ONLY (OUTPATIENT)
Age: 84
End: 2024-06-18

## 2024-06-18 VITALS — HEART RATE: 69 BPM | DIASTOLIC BLOOD PRESSURE: 83 MMHG | SYSTOLIC BLOOD PRESSURE: 133 MMHG

## 2024-06-18 DIAGNOSIS — I10 ESSENTIAL HYPERTENSION: Primary | ICD-10-CM

## 2024-06-18 NOTE — PROGRESS NOTES
Identified pt with two pt identifiers(name and ). Reviewed record in preparation for visit and have obtained necessary documentation.  Chief Complaint   Patient presents with    Blood Pressure Check        Vitals:    24 1106   BP: 133/83   Pulse: 69           Allyn Hernandez LPN     [facility]  [address]   [place]  [address]  W: [phone]  F: [phone]

## 2024-06-18 NOTE — TELEPHONE ENCOUNTER
Pt came into office for a nurse visit to let you know he does not want to his wife's cardiologist, he prefers a [facility] cardio. Please advise.

## 2024-06-25 NOTE — TELEPHONE ENCOUNTER
S/w pt on phone and gave him contact information to Dr. Vlad POLANCO.     He is asking for a [facility] cardio referral, I notified pt I will check back with Dr. Hubbard.

## 2024-06-25 NOTE — TELEPHONE ENCOUNTER
Pt states would like to be referred to a [facility]  cardiologist at [place]    Is there someone you can refer pt to?    Possibly osiris reeder?        Call pt to advise

## 2024-06-27 ENCOUNTER — TELEPHONE (OUTPATIENT)
Age: 84
End: 2024-06-27

## 2024-06-27 DIAGNOSIS — I10 ESSENTIAL (PRIMARY) HYPERTENSION: ICD-10-CM

## 2024-06-27 DIAGNOSIS — R06.09 DYSPNEA ON EXERTION: Primary | ICD-10-CM

## 2024-06-27 NOTE — TELEPHONE ENCOUNTER
Called, no answer left message to call office back.      Called to f/u with pt on the cardio referral, I told pt I would call him back once I received the info from Dr. Hubbard concerning a [facility] cardio ref.     Per Dr. Hubbard  Ok otherwise would refer to Dr. Poole.   [address] [address] [phone]

## 2024-06-27 NOTE — TELEPHONE ENCOUNTER
S/w pt and gave him Dr. Poole's contact information for scheduling per Dr. Hubbard.    Dr. Poole [facility]  [address] [address]  [phone]  Fax [phone]

## 2024-06-27 NOTE — TELEPHONE ENCOUNTER
S/w pt and gave him Dr. Poole's contact information for scheduling per Dr. Hubbard.     Dr. Poole [facility]  [address] [address]  [phone]  Fax [phone]

## 2024-07-08 DIAGNOSIS — M79.672 PAIN IN BOTH FEET: ICD-10-CM

## 2024-07-08 DIAGNOSIS — M79.671 PAIN IN BOTH FEET: ICD-10-CM

## 2024-07-08 RX ORDER — GABAPENTIN 300 MG/1
CAPSULE ORAL
Qty: 60 CAPSULE | Refills: 2 | Status: SHIPPED | OUTPATIENT
Start: 2024-07-08 | End: 2024-10-06

## 2024-10-17 ENCOUNTER — NURSE ONLY (OUTPATIENT)
Age: 84
End: 2024-10-17
Payer: MEDICARE

## 2024-10-17 DIAGNOSIS — Z23 NEEDS FLU SHOT: Primary | ICD-10-CM

## 2024-10-17 PROCEDURE — 90653 IIV ADJUVANT VACCINE IM: CPT | Performed by: STUDENT IN AN ORGANIZED HEALTH CARE EDUCATION/TRAINING PROGRAM

## 2024-10-17 PROCEDURE — PBSHW INFLUENZA, FLUAD TRIVALENT, (AGE 65 Y+), IM, PRESERVATIVE FREE, 0.5ML: Performed by: STUDENT IN AN ORGANIZED HEALTH CARE EDUCATION/TRAINING PROGRAM

## 2024-10-17 NOTE — PROGRESS NOTES
Gunnar Lizarraga  is a 84 y.o. male who presents for routine immunizations.   male denies any symptoms , reactions or allergies that would exclude them from being immunized today.  Risks and adverse reactions were discussed and the VIS was given to them. All questions were addressed.  male was observed for 5 min post injection. There were no reactions observed.    Duncan Howell

## 2024-10-30 ENCOUNTER — OFFICE VISIT (OUTPATIENT)
Age: 84
End: 2024-10-30
Payer: MEDICARE

## 2024-10-30 VITALS
WEIGHT: 168 LBS | OXYGEN SATURATION: 96 % | HEIGHT: 72 IN | RESPIRATION RATE: 16 BRPM | DIASTOLIC BLOOD PRESSURE: 91 MMHG | SYSTOLIC BLOOD PRESSURE: 146 MMHG | HEART RATE: 69 BPM | TEMPERATURE: 97.2 F | BODY MASS INDEX: 22.75 KG/M2

## 2024-10-30 DIAGNOSIS — J11.1 INFLUENZA: Primary | ICD-10-CM

## 2024-10-30 DIAGNOSIS — G47.33 OSA (OBSTRUCTIVE SLEEP APNEA): ICD-10-CM

## 2024-10-30 PROCEDURE — 99213 OFFICE O/P EST LOW 20 MIN: CPT | Performed by: INTERNAL MEDICINE

## 2024-10-30 RX ORDER — OSELTAMIVIR PHOSPHATE 75 MG/1
75 CAPSULE ORAL 2 TIMES DAILY
Qty: 10 CAPSULE | Refills: 0 | Status: SHIPPED | OUTPATIENT
Start: 2024-10-30 | End: 2024-11-04

## 2024-10-30 NOTE — PROGRESS NOTES
PROGRESS NOTE  Name: Gunnar Lizarraga   : 1940       ASSESSMENT/ PLAN:     Gunnar was seen today for other.    Diagnoses and all orders for this visit:    Influenza  -     oseltamivir (TAMIFLU) 75 MG capsule; Take 1 capsule by mouth 2 times daily for 5 days    JARRELL (obstructive sleep apnea)  -     [facility] - Cynthia Witt MD, Sleep Medicine, [place]    Return if symptoms worsen or fail to improve.     I have reviewed the patient's medications and risks/side effects/benefits were discussed. Diagnosis(-es) explained to patient and questions answered. Literature provided where appropriate.                       SUBJECTIVE  Mr. Gunnar Lizarraga presents today acutely for     Chief Complaint   Patient presents with    Other     Pt c/o flu dx      He tested positive for the flu, about a week ago, on Oct 23. He was given Rx for benzonatate. He has also been taking guaifenesin, DM-polistriex, and tylenol. \"I'm still weak, coughing, runny nose, sore throat, and congestion. It just isn't clearing up.\"     He has another complaint as well. \"Sometimes at night when I am asleep and I am on my back, my breathing stops and then I snort wake myself up and my wife.\"  He is interested in pursuing the diagnosis.    OBJECTIVE  BP (!) 146/91 (Site: Left Upper Arm, Position: Sitting, Cuff Size: Large Adult)   Pulse 69   Temp 97.2 °F (36.2 °C) (Temporal)   Resp 16   Ht 1.829 m (6')   Wt 76.2 kg (168 lb)   SpO2 96%   BMI 22.78 kg/m²   Gen: Pleasant 84 y.o.  male in NAD.   HEENT: PERRLA. EOMI. OP moist and pink.  Neck: Supple.  No LAD.  HEART: RRR, No M/G/R.   LUNGS: CTAB No W/R.   ABDOMEN: S, NT, ND, BS+.   EXTREMITIES: Warm. No C/C/E. MUSCULOSKELETAL: Normal ROM, muscle strength 5/5 all groups. NEURO: Alert and oriented x 3.  Cranial nerves grossly intact.  No focal sensory or motor deficits noted. SKIN: Warm. Dry. No rashes or other lesions noted.

## 2024-11-04 ENCOUNTER — TELEPHONE (OUTPATIENT)
Age: 84
End: 2024-11-04

## 2024-11-04 NOTE — TELEPHONE ENCOUNTER
F/U Regarding visit on:   10/30/2024 ACUTE with FOSTER      Caller states:  Concern:   Dx flu  Completed the Tamaflu  Still has bad cough  Requests visit  No available appts in practice to schedule.    Request:   Call to schedule [phone]

## 2024-11-05 RX ORDER — PRAVASTATIN SODIUM 40 MG
40 TABLET ORAL DAILY
Qty: 90 TABLET | Refills: 3 | Status: SHIPPED | OUTPATIENT
Start: 2024-11-05

## 2024-11-07 ENCOUNTER — OFFICE VISIT (OUTPATIENT)
Age: 84
End: 2024-11-07
Payer: MEDICARE

## 2024-11-07 VITALS
BODY MASS INDEX: 22.48 KG/M2 | SYSTOLIC BLOOD PRESSURE: 120 MMHG | DIASTOLIC BLOOD PRESSURE: 78 MMHG | OXYGEN SATURATION: 96 % | RESPIRATION RATE: 20 BRPM | HEART RATE: 71 BPM | TEMPERATURE: 98.1 F | HEIGHT: 72 IN | WEIGHT: 166 LBS

## 2024-11-07 DIAGNOSIS — J11.1 INFLUENZA: Primary | ICD-10-CM

## 2024-11-07 PROCEDURE — 99213 OFFICE O/P EST LOW 20 MIN: CPT | Performed by: STUDENT IN AN ORGANIZED HEALTH CARE EDUCATION/TRAINING PROGRAM

## 2024-11-07 RX ORDER — VIBEGRON 75 MG/1
1 TABLET, FILM COATED ORAL DAILY
COMMUNITY

## 2024-11-07 RX ORDER — OSELTAMIVIR PHOSPHATE 30 MG/1
CAPSULE ORAL 2 TIMES DAILY
COMMUNITY

## 2024-11-07 RX ORDER — GUAIFENESIN AND DEXTROMETHORPHAN HYDROBROMIDE 100; 10 MG/5ML; MG/5ML
5 SOLUTION ORAL EVERY 4 HOURS PRN
COMMUNITY

## 2024-11-07 RX ORDER — BENZONATATE 200 MG/1
200 CAPSULE ORAL 2 TIMES DAILY PRN
COMMUNITY
Start: 2024-02-28 | End: 2024-11-07 | Stop reason: SDUPTHER

## 2024-11-07 RX ORDER — BENZONATATE 200 MG/1
200 CAPSULE ORAL 3 TIMES DAILY PRN
Qty: 30 CAPSULE | Refills: 0 | Status: SHIPPED | OUTPATIENT
Start: 2024-11-07

## 2024-11-07 SDOH — ECONOMIC STABILITY: FOOD INSECURITY: WITHIN THE PAST 12 MONTHS, THE FOOD YOU BOUGHT JUST DIDN'T LAST AND YOU DIDN'T HAVE MONEY TO GET MORE.: NEVER TRUE

## 2024-11-07 SDOH — ECONOMIC STABILITY: INCOME INSECURITY: HOW HARD IS IT FOR YOU TO PAY FOR THE VERY BASICS LIKE FOOD, HOUSING, MEDICAL CARE, AND HEATING?: NOT HARD AT ALL

## 2024-11-07 SDOH — ECONOMIC STABILITY: FOOD INSECURITY: WITHIN THE PAST 12 MONTHS, YOU WORRIED THAT YOUR FOOD WOULD RUN OUT BEFORE YOU GOT MONEY TO BUY MORE.: NEVER TRUE

## 2024-11-07 NOTE — PROGRESS NOTES
\"Have you been to the ER, urgent care clinic since your last visit?  Hospitalized since your last visit?\"    [facility]// 10/23/24 +flu// Dr. Dejesus 10/30/24    “Have you seen or consulted any other health care providers outside our system since your last visit?”    NO           
Cervical back: No tenderness.      Right lower leg: No edema.      Left lower leg: No edema.   Skin:     General: Skin is warm and dry.   Neurological:      General: No focal deficit present.      Mental Status: He is alert and oriented to person, place, and time.      Cranial Nerves: No dysarthria.            Current Outpatient Medications:     GEMTESA 75 MG TABS tablet, Take 1 tablet by mouth daily, Disp: , Rfl:     oseltamivir (TAMIFLU) 30 MG capsule, Take by mouth 2 times daily, Disp: , Rfl:     guaiFENesin (MUCUS RELIEF ADULT PO), Take by mouth, Disp: , Rfl:     Dextromethorphan-guaiFENesin  MG/5ML SYRP, Take 5 mLs by mouth every 4 hours as needed for Cough, Disp: , Rfl:     benzonatate (TESSALON) 200 MG capsule, Take 1 capsule by mouth 3 times daily as needed for Cough, Disp: 30 capsule, Rfl: 0    pravastatin (PRAVACHOL) 40 MG tablet, TAKE 1 TABLET BY MOUTH DAILY, Disp: 90 tablet, Rfl: 3    rivaroxaban (XARELTO) 20 MG TABS tablet, Take 1 tablet by mouth daily, Disp: 90 tablet, Rfl: 3    amLODIPine (NORVASC) 5 MG tablet, Take 1 tablet by mouth daily, Disp: 90 tablet, Rfl: 1    vitamin B-12 (CYANOCOBALAMIN) 1000 MCG tablet, Take 1 tablet by mouth Every Day, Disp: , Rfl:     Ascorbic Acid (VITAMIN C) 1000 MG tablet, Take 0.5 tablets by mouth Every Day, Disp: , Rfl:     econazole nitrate 1 % cream, Apply topically, Disp: , Rfl:     docusate sodium (COLACE) 100 MG capsule, Take 1 capsule by mouth 2 times daily, Disp: , Rfl:     calcium carbonate (OS-JOLIE) 1250 (500 Ca) MG chewable tablet, Take 2 tablets by mouth daily, Disp: , Rfl:     ibandronate (BONIVA) 150 MG tablet, 1 tab(s) orally once a month for 90 days, Disp: , Rfl:     ergocalciferol (ERGOCALCIFEROL) 1.25 MG (54435 UT) capsule, , Disp: , Rfl:     vitamin C (ASCORBIC ACID) 500 MG tablet, Take 1 tablet by mouth daily, Disp: , Rfl:     Turmeric Curcumin 500 MG CAPS, Take by mouth, Disp: , Rfl:     Calcium Carbonate-Vitamin D 600-3.125 MG-MCG TABS, Take

## 2024-11-11 ENCOUNTER — OFFICE VISIT (OUTPATIENT)
Age: 84
End: 2024-11-11
Payer: MEDICARE

## 2024-11-11 VITALS
DIASTOLIC BLOOD PRESSURE: 88 MMHG | HEIGHT: 72 IN | WEIGHT: 167 LBS | HEART RATE: 75 BPM | BODY MASS INDEX: 22.62 KG/M2 | SYSTOLIC BLOOD PRESSURE: 144 MMHG | OXYGEN SATURATION: 98 % | RESPIRATION RATE: 16 BRPM | TEMPERATURE: 97.9 F

## 2024-11-11 DIAGNOSIS — Z01.818 PRE-OP EXAMINATION: Primary | ICD-10-CM

## 2024-11-11 DIAGNOSIS — R73.09 ABNORMAL GLUCOSE: ICD-10-CM

## 2024-11-11 DIAGNOSIS — I82.409 RECURRENT DEEP VEIN THROMBOSIS (DVT) (HCC): ICD-10-CM

## 2024-11-11 DIAGNOSIS — E55.9 HYPOVITAMINOSIS D: ICD-10-CM

## 2024-11-11 DIAGNOSIS — D68.51 FACTOR V LEIDEN MUTATION (HCC): ICD-10-CM

## 2024-11-11 LAB
25(OH)D3 SERPL-MCNC: 104 NG/ML (ref 30–100)
ALBUMIN SERPL-MCNC: 3.8 G/DL (ref 3.5–5)
ALBUMIN/GLOB SERPL: 1.4 (ref 1.1–2.2)
ALP SERPL-CCNC: 67 U/L (ref 45–117)
ALT SERPL-CCNC: 16 U/L (ref 12–78)
ANION GAP SERPL CALC-SCNC: 2 MMOL/L (ref 2–12)
APPEARANCE UR: CLEAR
AST SERPL-CCNC: 12 U/L (ref 15–37)
BACTERIA URNS QL MICRO: NEGATIVE /HPF
BASOPHILS # BLD: 0 K/UL (ref 0–0.1)
BASOPHILS NFR BLD: 1 % (ref 0–1)
BILIRUB SERPL-MCNC: 0.5 MG/DL (ref 0.2–1)
BILIRUB UR QL: NEGATIVE
BUN SERPL-MCNC: 14 MG/DL (ref 6–20)
BUN/CREAT SERPL: 15 (ref 12–20)
CALCIUM SERPL-MCNC: 10.4 MG/DL (ref 8.5–10.1)
CHLORIDE SERPL-SCNC: 105 MMOL/L (ref 97–108)
CO2 SERPL-SCNC: 32 MMOL/L (ref 21–32)
COLOR UR: NORMAL
CREAT SERPL-MCNC: 0.92 MG/DL (ref 0.7–1.3)
DIFFERENTIAL METHOD BLD: ABNORMAL
EOSINOPHIL # BLD: 0.1 K/UL (ref 0–0.4)
EOSINOPHIL NFR BLD: 1 % (ref 0–7)
EPITH CASTS URNS QL MICRO: NORMAL /LPF
ERYTHROCYTE [DISTWIDTH] IN BLOOD BY AUTOMATED COUNT: 15 % (ref 11.5–14.5)
EST. AVERAGE GLUCOSE BLD GHB EST-MCNC: 100 MG/DL
GLOBULIN SER CALC-MCNC: 2.8 G/DL (ref 2–4)
GLUCOSE SERPL-MCNC: 93 MG/DL (ref 65–100)
GLUCOSE UR STRIP.AUTO-MCNC: NEGATIVE MG/DL
HBA1C MFR BLD: 5.1 % (ref 4–5.6)
HCT VFR BLD AUTO: 43.3 % (ref 36.6–50.3)
HGB BLD-MCNC: 13.8 G/DL (ref 12.1–17)
HGB UR QL STRIP: NEGATIVE
HYALINE CASTS URNS QL MICRO: NORMAL /LPF (ref 0–5)
IMM GRANULOCYTES # BLD AUTO: 0 K/UL (ref 0–0.04)
IMM GRANULOCYTES NFR BLD AUTO: 0 % (ref 0–0.5)
INR PPP: 1.1 (ref 0.9–1.1)
KETONES UR QL STRIP.AUTO: NEGATIVE MG/DL
LEUKOCYTE ESTERASE UR QL STRIP.AUTO: NEGATIVE
LYMPHOCYTES # BLD: 1.3 K/UL (ref 0.8–3.5)
LYMPHOCYTES NFR BLD: 19 % (ref 12–49)
MCH RBC QN AUTO: 29 PG (ref 26–34)
MCHC RBC AUTO-ENTMCNC: 31.9 G/DL (ref 30–36.5)
MCV RBC AUTO: 91 FL (ref 80–99)
MONOCYTES # BLD: 0.7 K/UL (ref 0–1)
MONOCYTES NFR BLD: 10 % (ref 5–13)
NEUTS SEG # BLD: 5 K/UL (ref 1.8–8)
NEUTS SEG NFR BLD: 69 % (ref 32–75)
NITRITE UR QL STRIP.AUTO: NEGATIVE
NRBC # BLD: 0 K/UL (ref 0–0.01)
NRBC BLD-RTO: 0 PER 100 WBC
PH UR STRIP: 7.5 (ref 5–8)
PLATELET # BLD AUTO: 201 K/UL (ref 150–400)
PMV BLD AUTO: 10.1 FL (ref 8.9–12.9)
POTASSIUM SERPL-SCNC: 4.4 MMOL/L (ref 3.5–5.1)
PROT SERPL-MCNC: 6.6 G/DL (ref 6.4–8.2)
PROT UR STRIP-MCNC: NEGATIVE MG/DL
PROTHROMBIN TIME: 11.1 SEC (ref 9–11.1)
RBC # BLD AUTO: 4.76 M/UL (ref 4.1–5.7)
RBC #/AREA URNS HPF: NORMAL /HPF (ref 0–5)
SODIUM SERPL-SCNC: 139 MMOL/L (ref 136–145)
SP GR UR REFRACTOMETRY: 1.01 (ref 1–1.03)
URINE CULTURE IF INDICATED: NORMAL
UROBILINOGEN UR QL STRIP.AUTO: 0.2 EU/DL (ref 0.2–1)
WBC # BLD AUTO: 7.2 K/UL (ref 4.1–11.1)
WBC URNS QL MICRO: NORMAL /HPF (ref 0–4)

## 2024-11-11 PROCEDURE — 99213 OFFICE O/P EST LOW 20 MIN: CPT | Performed by: STUDENT IN AN ORGANIZED HEALTH CARE EDUCATION/TRAINING PROGRAM

## 2024-11-11 ASSESSMENT — PATIENT HEALTH QUESTIONNAIRE - PHQ9
SUM OF ALL RESPONSES TO PHQ QUESTIONS 1-9: 1
SUM OF ALL RESPONSES TO PHQ9 QUESTIONS 1 & 2: 1
SUM OF ALL RESPONSES TO PHQ QUESTIONS 1-9: 1
2. FEELING DOWN, DEPRESSED OR HOPELESS: SEVERAL DAYS
SUM OF ALL RESPONSES TO PHQ QUESTIONS 1-9: 1
1. LITTLE INTEREST OR PLEASURE IN DOING THINGS: NOT AT ALL
SUM OF ALL RESPONSES TO PHQ QUESTIONS 1-9: 1

## 2024-11-11 NOTE — PROGRESS NOTES
HISTORY OF PRESENT ILLNESS   Gunnar Lizarraga is a 84 y.o. male     PMH of controlled HTN, controlled HLD, BPH w hx of prostate cancer and RCC (hx of radiation tx, followed by urology, Dr. 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 Torsten Barone and ?podiatry), vitamin b12 and D deficiencies, and ED.    Here for pre-op for R TKA on 11/21/24.      No hx of MI   No chest pain in the last 30 days.   No swelling of the legs, shortness of breath, orthopnea or PND  No hx of cardiac arrhythmia   He is scheduled for sleep test on 3/31/24   Stress test recently 9/5/24  normal - in media  Followed by Dr. Leung.     Hemoglobin A1C   Date Value Ref Range Status   12/12/2022 5.3 4.0 - 5.6 % Final     Comment:     NEW METHOD  PLEASE NOTE NEW REFERENCE RANGE  (NOTE)  HbA1C Interpretive Ranges  <5.7              Normal  5.7 - 6.4         Consider Prediabetes  >6.5              Consider Diabetes       He is doing some light exercises. He does leg kicks and curls, and stair steps.   He can walk about 50  yards, limited by pain in feet.   He  is able to tolerate walking up 1 flight of stairs w/o shortness of breath or chest pain. He does one flight of stairs, 4 times per day. He does 10 minute on the stationary bike 6 days per week.     Lab Results   Component Value Date    HGB 14.1 05/28/2024       No significant bleeding or bruising.  No bloody or black stool.     Allergies   Allergen Reactions    Quinolones      Other reaction(s): Unknown (comments) MAY CAUSE ACHILLES tENDONITIS   No reactions of anesthesia or latex.       - Tobacco use: none  - Alcohol use: none  - Drug use: none    Past Surgical History:   Procedure Laterality Date    COLORECTAL SCRN; HI RISK IND  2/3/2015         ORTHOPEDIC SURGERY      cortisone shot in knee and elbow    OTHER SURGICAL HISTORY      colonoscopy

## 2024-11-12 NOTE — RESULT ENCOUNTER NOTE
Patient notified of results and response from Milton Hubbard MD    States understanding       Next appt 11/25/2024

## 2024-11-12 NOTE — ASSESSMENT & PLAN NOTE
Pre-Operative Risk assessment using 2014 ACC/AHA guidelines     Emergent procedure No  Active Cardiac Condition No (decompensated HF, Arrhythmia, MI <3 weeks, severe valve disease)  Risk Level of Procedure Intermediate Risk (intraperitoneal, intrathoracic, HENT, orthopedic, or carotid endarterectomy, etc.)  Revised Cardiac Risk Index Risk factors: None  Measurement of Exercise Tolerance before Surgery >4 Yes    According to the 2014 ACC/AHA pre-operative risk assessment guidelines Gunnar Lizarraga is a low risk for major cardiac complications during a intermediate risk procedure and may continue as planned. Specific medication recommendations are listed below. Medications recommended to continue should be taken with a sip of water even when NPO.     Further recommendations from consultants: None  Note that patient plans to get cardiac clearance as well from his cardiologist. Will defer EKG to cardiology.     Medication Recommendations:  DOAC's Rivaroxaban High bleeding risk - HOLD 48 hours prior to surgery (2 total doses). Resume 48-72 hrs after surgery

## 2024-11-12 NOTE — RESULT ENCOUNTER NOTE
Your vitamin D level is high and calcium is mildly elevated. Ask Dr. Holland if you should reduce the amount of calcium and vitamin D you are taking at your next endocrinology appointment.     Lab work is otherwise stable.

## 2024-11-13 ENCOUNTER — HOSPITAL ENCOUNTER (OUTPATIENT)
Facility: HOSPITAL | Age: 84
Discharge: HOME | End: 2024-11-16
Payer: MEDICARE

## 2024-11-13 VITALS
WEIGHT: 166.67 LBS | OXYGEN SATURATION: 100 % | RESPIRATION RATE: 14 BRPM | HEART RATE: 70 BPM | DIASTOLIC BLOOD PRESSURE: 106 MMHG | BODY MASS INDEX: 22.57 KG/M2 | TEMPERATURE: 97.7 F | HEIGHT: 72 IN | SYSTOLIC BLOOD PRESSURE: 166 MMHG

## 2024-11-13 LAB
ABO + RH BLD: NORMAL
BLOOD GROUP ANTIBODIES SERPL: NORMAL
SPECIMEN EXP DATE BLD: NORMAL

## 2024-11-13 PROCEDURE — 97530 THERAPEUTIC ACTIVITIES: CPT

## 2024-11-13 PROCEDURE — 97161 PT EVAL LOW COMPLEX 20 MIN: CPT

## 2024-11-13 PROCEDURE — 86900 BLOOD TYPING SEROLOGIC ABO: CPT

## 2024-11-13 PROCEDURE — 86901 BLOOD TYPING SEROLOGIC RH(D): CPT

## 2024-11-13 PROCEDURE — 86850 RBC ANTIBODY SCREEN: CPT

## 2024-11-13 PROCEDURE — 36415 COLL VENOUS BLD VENIPUNCTURE: CPT

## 2024-11-13 RX ORDER — PREGABALIN 75 MG/1
75 CAPSULE ORAL 2 TIMES DAILY
COMMUNITY

## 2024-11-13 RX ORDER — GINSENG 100 MG
50 CAPSULE ORAL DAILY
COMMUNITY

## 2024-11-13 RX ORDER — DIPHENHYDRAMINE HCL 25 MG
25 CAPSULE ORAL NIGHTLY PRN
COMMUNITY

## 2024-11-13 RX ORDER — ACETAMINOPHEN 500 MG
500 TABLET ORAL EVERY 6 HOURS PRN
COMMUNITY

## 2024-11-13 RX ORDER — TERBINAFINE HYDROCHLORIDE 250 MG/1
250 TABLET ORAL
COMMUNITY

## 2024-11-13 RX ORDER — CELECOXIB 200 MG/1
400 CAPSULE ORAL ONCE
OUTPATIENT
Start: 2024-11-21

## 2024-11-13 RX ORDER — SODIUM CHLORIDE, SODIUM LACTATE, POTASSIUM CHLORIDE, CALCIUM CHLORIDE 600; 310; 30; 20 MG/100ML; MG/100ML; MG/100ML; MG/100ML
INJECTION, SOLUTION INTRAVENOUS CONTINUOUS
OUTPATIENT
Start: 2024-11-21

## 2024-11-13 RX ORDER — ACETAMINOPHEN 500 MG
1000 TABLET ORAL ONCE
OUTPATIENT
Start: 2024-11-21

## 2024-11-13 ASSESSMENT — PROMIS GLOBAL HEALTH SCALE
SUM OF RESPONSES TO QUESTIONS 2, 4, 5, & 10: 17
WHO IS THE PERSON COMPLETING THE PROMIS V1.1 SURVEY?: SELF
SUM OF RESPONSES TO QUESTIONS 3, 6, 7, & 8: 15
IN GENERAL, HOW WOULD YOU RATE YOUR MENTAL HEALTH, INCLUDING YOUR MOOD AND YOUR ABILITY TO THINK [ON A SCALE OF 1 (POOR) TO 5 (EXCELLENT)]?: VERY GOOD
IN GENERAL, HOW WOULD YOU RATE YOUR SATISFACTION WITH YOUR SOCIAL ACTIVITIES AND RELATIONSHIPS [ON A SCALE OF 1 (POOR) TO 5 (EXCELLENT)]?: EXCELLENT
IN THE PAST 7 DAYS, HOW WOULD YOU RATE YOUR PAIN ON AVERAGE [ON A SCALE FROM 0 (NO PAIN) TO 10 (WORST IMAGINABLE PAIN)]?: 7
IN THE PAST 7 DAYS, HOW OFTEN HAVE YOU BEEN BOTHERED BY EMOTIONAL PROBLEMS, SUCH AS FEELING ANXIOUS, DEPRESSED, OR IRRITABLE [ON A SCALE FROM 1 (NEVER) TO 5 (ALWAYS)]?: RARELY
IN GENERAL, HOW WOULD YOU RATE YOUR PHYSICAL HEALTH [ON A SCALE OF 1 (POOR) TO 5 (EXCELLENT)]?: GOOD
IN GENERAL, WOULD YOU SAY YOUR HEALTH IS...[ON A SCALE OF 1 (POOR) TO 5 (EXCELLENT)]: GOOD
IN THE PAST 7 DAYS, HOW WOULD YOU RATE YOUR FATIGUE ON AVERAGE [ON A SCALE FROM 1 (NONE) TO 5 (VERY SEVERE)]?: MODERATE
IN GENERAL, WOULD YOU SAY YOUR QUALITY OF LIFE IS...[ON A SCALE OF 1 (POOR) TO 5 (EXCELLENT)]: VERY GOOD
TO WHAT EXTENT ARE YOU ABLE TO CARRY OUT YOUR EVERYDAY PHYSICAL ACTIVITIES SUCH AS WALKING, CLIMBING STAIRS, CARRYING GROCERIES, OR MOVING A CHAIR [ON A SCALE OF 1 (NOT AT ALL) TO 5 (COMPLETELY)]?: A LITTLE
IN GENERAL, PLEASE RATE HOW WELL YOU CARRY OUT YOUR USUAL SOCIAL ACTIVITIES (INCLUDES ACTIVITIES AT HOME, AT WORK, AND IN YOUR COMMUNITY, AND RESPONSIBILITIES AS A PARENT, CHILD, SPOUSE, EMPLOYEE, FRIEND, ETC) [ON A SCALE OF 1 (POOR) TO 5 (EXCELLENT)]?: VERY GOOD
HOW IS THE PROMIS V1.1 BEING ADMINISTERED?: PAPER

## 2024-11-13 ASSESSMENT — KOOS JR
KOOS JR TOTAL INTERVAL SCORE: 42.281
STRAIGHTENING KNEE FULLY: SEVERE
RISING FROM SITTING: SEVERE
HOW SEVERE IS YOUR KNEE STIFFNESS AFTER FIRST WAKING IN MORNING: SEVERE
GOING UP OR DOWN STAIRS: MODERATE
TWISING OR PIVOTING ON KNEE: SEVERE
BENDING TO THE FLOOR TO PICK UP OBJECT: MODERATE
STANDING UPRIGHT: MODERATE

## 2024-11-13 ASSESSMENT — PAIN DESCRIPTION - ORIENTATION: ORIENTATION: RIGHT

## 2024-11-13 ASSESSMENT — PAIN DESCRIPTION - LOCATION: LOCATION: KNEE

## 2024-11-13 ASSESSMENT — PAIN SCALES - GENERAL: PAINLEVEL_OUTOF10: 8

## 2024-11-13 ASSESSMENT — PAIN DESCRIPTION - DESCRIPTORS: DESCRIPTORS: SHARP

## 2024-11-13 NOTE — PERIOP NOTE
[facility]  Joint/Spine Preoperative Instructions        Surgery Date: 11/21/24          Time of Arrival To Be Called (day prior between 2-5 pm)  Contact: [phone] (if no answer, call [phone])       1. On the day of your surgery, please report to the Surgical Services Registration Desk and sign in at your designated time. The Surgery Center is located to the right of the Emergency Room.     2. You must have someone with you to drive you home. You should not drive a car for 24 hours following surgery. Please make arrangements for a friend or family member to stay with you for the first 24 hours after your surgery.    3. No food after midnight Wednesday November 20th.  Medications morning of surgery should be taken with a sip of water.  Please follow pre-surgery drink instructions that were given at your Pre Admission Testing appointment.      4. We recommend you do not drink any alcoholic beverages for 24 hours before and after your surgery.    5. Contact your surgeon’s office for instructions on the following medications: non-steroidal anti-inflammatory drugs (i.e. Advil, Aleve), vitamins, and supplements. (Some surgeon’s will want you to stop these medications prior to surgery and others may allow you to take them)  **If you are currently taking Plavix, Coumadin, Aspirin and/or other blood-thinning agents, contact your surgeon for instructions.** Your surgeon will partner with the physician prescribing these medications to determine if it is safe to stop or if you need to continue taking.  Please do not stop taking these medications without instructions from your surgeon    6. Wear comfortable clothes.  Wear glasses instead of contacts.  Do not bring any money or jewelry. Please bring picture ID, insurance card, and any prearranged co-payment or hospital payment.  Do not wear make-up, particularly mascara the morning of your surgery.  Do not wear nail polish, particularly 
Incentive Spirometer        Using the incentive spirometer helps expand the small air sacs of your lungs, helps you breathe deeply, and helps improve your lung function.  Use your incentive spirometer twice a day (10 breaths each time) prior to surgery.      How to Use Your Incentive Spirometer:  Hold the incentive spirometer in an upright position.   Breathe out as usual.   Place the mouthpiece in your mouth and seal your lips tightly around it.   Take a deep breath.  Breathe in slowly and as deeply as possible. Keep the blue flow rate guide between the arrows.   Hold your breath as long as possible. Then exhale slowly and allow the piston to fall to the bottom of the column.   Rest for a few seconds and repeat steps one through five at least 10 times.     PAT Tidal Volume: 1000  X: 3  Date: 11/13/24        BRING THE INCENTIVE SPIROMETER WITH YOU TO THE HOSPITAL ON THE DAY OF YOUR SURGERY.  Opportunity given to ask and answer questions as well as to observe return demonstration.    Patient signature_____________________________    Witness____________________________    
Orthopedic and Spine Patients:  Instructions on When You Can   Eat or Drink Before Surgery      You have been provided 2 pre-surgery drinks received at your pre-admission testing appointment.    Night before surgery:  You should drink one bottle of the  pre-surgery drink at bedtime.   No food after midnight!        Day of Surgery:  Complete 2nd bottle of the pre-surgery drink 1 hour prior to arrival at hospital.      For questions call Pre-Admission Testing at [phone].  They are available from 8:00am-5:00pm, Monday through Friday.    
The [facility] \"Your Path to a More Active Life\" orthopedic total knee or total hip educational video and the [facility] patient handbook provided & reviewed during the patients pre-admission testing (PAT) appointment. An opportunity for questions was provided, patient verbalized understanding.    
surgery:  Shower again thoroughly with an antibacterial soap, such as Dial or the soap provided at your preassessment appointment. If needed, ask someone for help to reach all areas of your body. Don’t forget to clean your belly button! Rinse.  With bottle of Hibiclens/Chlorhexidine in hand, turn water off.  Apply Hibiclens antiseptic skin cleanser with a clean, freshly washed washcloth.  Gently apply to your body from chin to toes (except the genital area) and especially the area(s) where your incision(s) will be.  Leave Hibiclens/Chlorhexidine on your skin for at least 20 seconds.     CAUTION: If needed, Hibiclens/chlorhexidine may be used to clean the folds of skin of the legs (such as in the area of the groin) and on your buttocks and hips. However, do not use Hibiclens/Chlorhexidine above the neck or in the genital area (your bottom) or put inside any area of your body.  Turn the water back on and rinse.  Dry gently with a clean, freshly washed towel.  After your shower, do not use any powder, deodorant, perfumes or lotion prior to surgery.  Put on clean, freshly washed clothing.    Tips to help prevent infections after your surgery:  Protect your surgical wound from germs:  Hand washing is the most important thing you and your caregivers can do to prevent infections.  Keep your bandage clean and dry!  Do not touch your surgical wound.  Use clean, freshly washed towels and washcloths every time you shower; do not share bath linens with others.  Until your surgical wound is healed, wear clothing and sleep on bed linens each day that are clean and freshly washed.  Do not allow pets to sleep in your bed with you or touch your surgical wound.  Do not smoke - smoking delays wound healing. This may be a good time to stop smoking.  If you have diabetes, it is important for you to manage your blood sugar levels properly before your surgery as well as after your surgery. Poorly managed blood sugar levels slow down wound

## 2024-11-14 LAB
BACTERIA SPEC CULT: NORMAL
BACTERIA SPEC CULT: NORMAL
SERVICE CMNT-IMP: NORMAL

## 2024-11-15 NOTE — H&P
Torsten Barone MD - Adult Reconstruction and Total Joint Replacement     Orthopaedic History and Physical        NAME: Gunnar Lizarraga       :  1940       MRN:  [id number]          Vincenzo Verduzco - 2024 11:00 AM EST  Formatting of this note is different from the original.    This note has been transcribed electronically and is believed to be accurate but may contain errors due to technological limitations.  Subjective:  Patient ID: Gunnar Lizarraga is a 84 y.o. male.  Chief Complaint: Follow-up of the Left Knee and Follow-up of the Right Knee    Mr. Lizarraga presents for follow up for his bilateral knee pain. Pain continues to be bothersome when standing up but settles down with activity. Previous injections have been giving significant relief for around 2 months, but he wishes to discuss potential surgery options. He takes Xarelto for a previous blood clot. He states he has recently seen his cardiologist and was doing well. He has pain with ambulation and mechanical movements. He is using a cane to help him ambulate.      Patient Active Problem List    Diagnosis Date Noted    Primary osteoarthritis of right knee 2021    Post-traumatic osteoarthritis of left shoulder 2020    Mid back pain 2018    Recurrent deep vein thrombosis (DVT) (HCC) 10/03/2017    Pre-op examination 2024    Essential hypertension 2024    Malignant neoplasm of right kidney, except renal pelvis (HCC) 2024    Dyspnea on exertion 2024    Depressed mood 2023    Benign prostatic hyperplasia 2023    History of kidney cancer 2023    Pain in both feet 2023    Arthritis     Chronic kidney disease     Embolism and thrombosis of unspecified artery (HCC)     History of prostate cancer 2020    Vitamin D deficiency 10/03/2017    Long term current use of anticoagulant therapy 10/03/2017    Eczema 10/03/2017    Renal cell cancer (HCC) 10/03/2017    Factor V Leiden mutation (HCC)  quad contracture bilaterally. TTP is noted on the medial and lateral joint line of the right knee. Full flexion is noted. Ligamentous exam stable.    Imaging:  X-ray knee bilateral AP standing (30087)    Result Date: 11/8/2024  Views: Standing AP.    Impression: Significant osteoarthritis of both knees with grade 4 joint space loss in both the medial and lateral compartments. Worse on the right knee compared to the left knee.    Assessment:    ICD-10-CM  1. Localized osteoarthritis of right knee M17.11  2. Localized osteoarthritis of left knee M17.12  3. Long term current use of anticoagulant Z79.01    Plan:  At this time, I recommended and we will schedule a date for a right TKA with a left knee steroid injection. Conservative treatments including activity modification, exercise/therapy, medication, and/or injection have not successfully relieved pain. Surgery was discussed at length today. We went over all the pertinent risks, including but not limited to blood clot, infection, nerve/vessel damage, fracture, mechanical failure, other medical/anesthesia related complications, and failure to provide complete relief. We discussed the benefits and alternatives to the procedure. They understand no guarantees can be made about the outcome and they would like to proceed. I discussed the pre-op process and general recovery timeline with the patient. They understand the need for medical and cardiac clearance. In the meantime, I gave him a PT script for strengthening exercises pre-op. All questions were answered to his satisfaction. Follow up as-needed.    Scribed for Salome Satley PA-C by Vincenzo Verduzco.  Supervising Physician: MD Kyle العراقي PA  Physician Assistant with Torsten Barone MD

## 2024-11-19 DIAGNOSIS — I10 ESSENTIAL HYPERTENSION: ICD-10-CM

## 2024-11-19 RX ORDER — AMLODIPINE BESYLATE 5 MG/1
2.5 TABLET ORAL DAILY
Qty: 90 TABLET | Refills: 1 | Status: ON HOLD | OUTPATIENT
Start: 2024-11-19 | End: 2024-11-21

## 2024-11-19 NOTE — TELEPHONE ENCOUNTER
Caller requests Refill of:    amLODIPine (NORVASC) 5 MG tablet       Please send to:    [facility] [id number] - [place], [place] - [address] - P [phone] - F [phone] [address] [address]  Phone: [phone] Fax: [phone]         Visit / Appointment History:  Future Appointment at [facility]:  3/11/2025   Last Appointment With PCP:  11/11/2024       Caller confirmed instructions and dosages as correct.    Caller was advised that Meds will be refilled as soon as possible, however there can be a 48-72 business hour turn around on refill requests.

## 2024-11-20 DIAGNOSIS — I10 ESSENTIAL HYPERTENSION: ICD-10-CM

## 2024-11-20 NOTE — DISCHARGE INSTRUCTIONS
Discharge Instructions:  Gunnar Lizarraga    Surgery: TOTAL KNEE REPLACEMENT.        To relieve pain:  Use ice/gel packs.    -Put the ice pack directly over the wound, or anywhere you are hurting or swollen.   -To control pain and swelling, keep ice on regularly, especially after physical activity.  -The packs should stay cold for 3-4 hours.  When it is not cold anymore, rotate with the packs in the freezer.      Elevate your leg.  This will also keep swelling down.    Rest for at least 20 minutes between activity or exercises.    To keep track of your pain medications, write down what you take and when you take it.    The last dose of pain medication you got in the hospital was:     Medication    Dose    Date & Time      Choose your medications based on the pain scale below:    To keep your pain under control, take Tylenol every 6 hours for 14 days - even if you feel like you don’t need it.     For mild to moderate pain (4-6 on pain scale), take one pain pill every 4 hours or as instructed.     For severe pain (7-10 on pain scale), take two pain pills every 4 hours or as instructed.     To prevent nausea, take your pain medications with food.                                            Pain Scale              As your pain lessens:    Slowly start taking less pain medication. You may do this by waiting longer between doses or by taking smaller doses.    Stop using the pain medications as soon as you no longer need it, usually in 2-3 weeks.        Aspirin  To prevent blood clots, you will need to take Aspirin 81 mg twice a day for 30 days.              To prevent stomach upset or bleeding:  Take Pepcid 20 mg twice a day, or a similar home medication, while you are taking a blood thinner.         Keep your waterproof dressing in place. It will be removed by your surgeon during your follow-up appointment in 2 weeks.     You may need to change the dressing if you are having drainage to where the dressing is no longer  these medications, we are giving you the following instructions.    Discharge Instructions:   - Someone should be with you for the next 24 hours  - For your own safety, a responsible adult must drive you home  - Do not consume alcoholic beverages   - Do not make important personal, legal or business decisions for 24 hours  - Move slowly and carefully, do not make sudden position changes. Be alert for dizziness or lightheadedness and move accordingly.  - Do not operate equipment for 24 hours - Lawn mowers, power tools, Kitchen accessories: stove, etc.  - If you have not urinated within 8 hours after discharge, please contact your surgeon's office.  ]

## 2024-11-21 ENCOUNTER — ANESTHESIA EVENT (OUTPATIENT)
Facility: HOSPITAL | Age: 84
End: 2024-11-21
Payer: MEDICARE

## 2024-11-21 ENCOUNTER — HOSPITAL ENCOUNTER (OUTPATIENT)
Facility: HOSPITAL | Age: 84
Discharge: HOME | End: 2024-11-22
Attending: ORTHOPAEDIC SURGERY | Admitting: ORTHOPAEDIC SURGERY
Payer: MEDICARE

## 2024-11-21 ENCOUNTER — ANESTHESIA (OUTPATIENT)
Facility: HOSPITAL | Age: 84
End: 2024-11-21
Payer: MEDICARE

## 2024-11-21 DIAGNOSIS — Z96.651 S/P TOTAL KNEE ARTHROPLASTY, RIGHT: Primary | ICD-10-CM

## 2024-11-21 PROCEDURE — 2580000003 HC RX 258: Performed by: ORTHOPAEDIC SURGERY

## 2024-11-21 PROCEDURE — 51701 INSERT BLADDER CATHETER: CPT

## 2024-11-21 PROCEDURE — 6360000002 HC RX W HCPCS: Performed by: ORTHOPAEDIC SURGERY

## 2024-11-21 PROCEDURE — 2580000003 HC RX 258: Performed by: ANESTHESIOLOGY

## 2024-11-21 PROCEDURE — C1713 ANCHOR/SCREW BN/BN,TIS/BN: HCPCS | Performed by: ORTHOPAEDIC SURGERY

## 2024-11-21 PROCEDURE — 3600000015 HC SURGERY LEVEL 5 ADDTL 15MIN: Performed by: ORTHOPAEDIC SURGERY

## 2024-11-21 PROCEDURE — 2500000003 HC RX 250 WO HCPCS

## 2024-11-21 PROCEDURE — 97161 PT EVAL LOW COMPLEX 20 MIN: CPT | Performed by: PHYSICAL THERAPIST

## 2024-11-21 PROCEDURE — 6360000002 HC RX W HCPCS: Performed by: STUDENT IN AN ORGANIZED HEALTH CARE EDUCATION/TRAINING PROGRAM

## 2024-11-21 PROCEDURE — 64447 NJX AA&/STRD FEMORAL NRV IMG: CPT | Performed by: STUDENT IN AN ORGANIZED HEALTH CARE EDUCATION/TRAINING PROGRAM

## 2024-11-21 PROCEDURE — 3700000001 HC ADD 15 MINUTES (ANESTHESIA): Performed by: ORTHOPAEDIC SURGERY

## 2024-11-21 PROCEDURE — 3700000000 HC ANESTHESIA ATTENDED CARE: Performed by: ORTHOPAEDIC SURGERY

## 2024-11-21 PROCEDURE — 3600000005 HC SURGERY LEVEL 5 BASE: Performed by: ORTHOPAEDIC SURGERY

## 2024-11-21 PROCEDURE — 6360000002 HC RX W HCPCS

## 2024-11-21 PROCEDURE — C1776 JOINT DEVICE (IMPLANTABLE): HCPCS | Performed by: ORTHOPAEDIC SURGERY

## 2024-11-21 PROCEDURE — 7100000001 HC PACU RECOVERY - ADDTL 15 MIN: Performed by: ORTHOPAEDIC SURGERY

## 2024-11-21 PROCEDURE — 6370000000 HC RX 637 (ALT 250 FOR IP): Performed by: ORTHOPAEDIC SURGERY

## 2024-11-21 PROCEDURE — 2709999900 HC NON-CHARGEABLE SUPPLY: Performed by: ORTHOPAEDIC SURGERY

## 2024-11-21 PROCEDURE — 51798 US URINE CAPACITY MEASURE: CPT

## 2024-11-21 PROCEDURE — 7100000000 HC PACU RECOVERY - FIRST 15 MIN: Performed by: ORTHOPAEDIC SURGERY

## 2024-11-21 PROCEDURE — 97530 THERAPEUTIC ACTIVITIES: CPT | Performed by: PHYSICAL THERAPIST

## 2024-11-21 DEVICE — CRC TIBIAL INSERT
Type: IMPLANTABLE DEVICE | Site: KNEE | Status: FUNCTIONAL
Brand: TRULIANT, ACTIVIT-E

## 2024-11-21 DEVICE — COMPONENT TOT KNEE CAPPED K2 HEMI ADV CMNTLS K2EXACTECH: Type: IMPLANTABLE DEVICE | Status: FUNCTIONAL

## 2024-11-21 DEVICE — TRULIANT CR POROUS FEMORAL
Type: IMPLANTABLE DEVICE | Site: KNEE | Status: FUNCTIONAL
Brand: TRULIANT

## 2024-11-21 DEVICE — IMPLANTABLE DEVICE
Type: IMPLANTABLE DEVICE | Site: KNEE | Status: FUNCTIONAL
Brand: ALTEON

## 2024-11-21 DEVICE — TRULIANT POROUS TIBIAL TRAY
Type: IMPLANTABLE DEVICE | Site: KNEE | Status: FUNCTIONAL
Brand: TRULIANT

## 2024-11-21 RX ORDER — TRAMADOL HYDROCHLORIDE 50 MG/1
100 TABLET ORAL EVERY 6 HOURS PRN
Status: DISCONTINUED | OUTPATIENT
Start: 2024-11-21 | End: 2024-11-22 | Stop reason: HOSPADM

## 2024-11-21 RX ORDER — AMLODIPINE BESYLATE 5 MG/1
5 TABLET ORAL DAILY
Qty: 90 TABLET | Refills: 1 | Status: SHIPPED | OUTPATIENT
Start: 2024-11-21

## 2024-11-21 RX ORDER — ROPIVACAINE HYDROCHLORIDE 5 MG/ML
INJECTION, SOLUTION EPIDURAL; INFILTRATION; PERINEURAL PRN
Status: DISCONTINUED | OUTPATIENT
Start: 2024-11-21 | End: 2024-11-21 | Stop reason: ALTCHOICE

## 2024-11-21 RX ORDER — FENTANYL CITRATE 50 UG/ML
INJECTION, SOLUTION INTRAMUSCULAR; INTRAVENOUS
Status: DISCONTINUED | OUTPATIENT
Start: 2024-11-21 | End: 2024-11-21 | Stop reason: SDUPTHER

## 2024-11-21 RX ORDER — SENNA AND DOCUSATE SODIUM 50; 8.6 MG/1; MG/1
1 TABLET, FILM COATED ORAL 2 TIMES DAILY
Status: DISCONTINUED | OUTPATIENT
Start: 2024-11-21 | End: 2024-11-22 | Stop reason: HOSPADM

## 2024-11-21 RX ORDER — GLUCAGON 1 MG/ML
1 KIT INJECTION PRN
Status: DISCONTINUED | OUTPATIENT
Start: 2024-11-21 | End: 2024-11-21 | Stop reason: HOSPADM

## 2024-11-21 RX ORDER — SODIUM CHLORIDE 0.9 % (FLUSH) 0.9 %
5-40 SYRINGE (ML) INJECTION PRN
Status: DISCONTINUED | OUTPATIENT
Start: 2024-11-21 | End: 2024-11-21 | Stop reason: HOSPADM

## 2024-11-21 RX ORDER — AMLODIPINE BESYLATE 2.5 MG/1
2.5 TABLET ORAL DAILY
Status: DISCONTINUED | OUTPATIENT
Start: 2024-11-22 | End: 2024-11-22 | Stop reason: HOSPADM

## 2024-11-21 RX ORDER — IPRATROPIUM BROMIDE AND ALBUTEROL SULFATE 2.5; .5 MG/3ML; MG/3ML
1 SOLUTION RESPIRATORY (INHALATION)
Status: DISCONTINUED | OUTPATIENT
Start: 2024-11-21 | End: 2024-11-21 | Stop reason: HOSPADM

## 2024-11-21 RX ORDER — SODIUM CHLORIDE 0.9 % (FLUSH) 0.9 %
5-40 SYRINGE (ML) INJECTION EVERY 12 HOURS SCHEDULED
Status: DISCONTINUED | OUTPATIENT
Start: 2024-11-21 | End: 2024-11-22 | Stop reason: HOSPADM

## 2024-11-21 RX ORDER — ASPIRIN 81 MG/1
81 TABLET ORAL 2 TIMES DAILY
Status: CANCELLED | OUTPATIENT
Start: 2024-11-21

## 2024-11-21 RX ORDER — PROCHLORPERAZINE EDISYLATE 5 MG/ML
5 INJECTION INTRAMUSCULAR; INTRAVENOUS
Status: DISCONTINUED | OUTPATIENT
Start: 2024-11-21 | End: 2024-11-21 | Stop reason: HOSPADM

## 2024-11-21 RX ORDER — ONDANSETRON 2 MG/ML
INJECTION INTRAMUSCULAR; INTRAVENOUS
Status: DISCONTINUED | OUTPATIENT
Start: 2024-11-21 | End: 2024-11-21 | Stop reason: SDUPTHER

## 2024-11-21 RX ORDER — BUPIVACAINE HYDROCHLORIDE 2.5 MG/ML
INJECTION, SOLUTION EPIDURAL; INFILTRATION; INTRACAUDAL
Status: DISCONTINUED | OUTPATIENT
Start: 2024-11-21 | End: 2024-11-21 | Stop reason: SDUPTHER

## 2024-11-21 RX ORDER — DEXAMETHASONE SODIUM PHOSPHATE 4 MG/ML
INJECTION, SOLUTION INTRA-ARTICULAR; INTRALESIONAL; INTRAMUSCULAR; INTRAVENOUS; SOFT TISSUE
Status: DISCONTINUED | OUTPATIENT
Start: 2024-11-21 | End: 2024-11-21 | Stop reason: SDUPTHER

## 2024-11-21 RX ORDER — TRAMADOL HYDROCHLORIDE 50 MG/1
50 TABLET ORAL EVERY 6 HOURS PRN
Status: DISCONTINUED | OUTPATIENT
Start: 2024-11-21 | End: 2024-11-22 | Stop reason: HOSPADM

## 2024-11-21 RX ORDER — ROCURONIUM BROMIDE 10 MG/ML
INJECTION, SOLUTION INTRAVENOUS
Status: DISCONTINUED | OUTPATIENT
Start: 2024-11-21 | End: 2024-11-21 | Stop reason: SDUPTHER

## 2024-11-21 RX ORDER — CELECOXIB 200 MG/1
400 CAPSULE ORAL ONCE
Status: COMPLETED | OUTPATIENT
Start: 2024-11-21 | End: 2024-11-21

## 2024-11-21 RX ORDER — DEXTROSE MONOHYDRATE 100 MG/ML
INJECTION, SOLUTION INTRAVENOUS CONTINUOUS PRN
Status: DISCONTINUED | OUTPATIENT
Start: 2024-11-21 | End: 2024-11-21 | Stop reason: HOSPADM

## 2024-11-21 RX ORDER — SODIUM CHLORIDE 0.9 % (FLUSH) 0.9 %
5-40 SYRINGE (ML) INJECTION EVERY 12 HOURS SCHEDULED
Status: DISCONTINUED | OUTPATIENT
Start: 2024-11-21 | End: 2024-11-21 | Stop reason: HOSPADM

## 2024-11-21 RX ORDER — SODIUM CHLORIDE, SODIUM LACTATE, POTASSIUM CHLORIDE, CALCIUM CHLORIDE 600; 310; 30; 20 MG/100ML; MG/100ML; MG/100ML; MG/100ML
INJECTION, SOLUTION INTRAVENOUS CONTINUOUS
Status: DISCONTINUED | OUTPATIENT
Start: 2024-11-21 | End: 2024-11-21 | Stop reason: HOSPADM

## 2024-11-21 RX ORDER — TERBINAFINE HYDROCHLORIDE 250 MG/1
250 TABLET ORAL
Status: DISCONTINUED | OUTPATIENT
Start: 2024-11-22 | End: 2024-11-22 | Stop reason: HOSPADM

## 2024-11-21 RX ORDER — ONDANSETRON 2 MG/ML
4 INJECTION INTRAMUSCULAR; INTRAVENOUS EVERY 6 HOURS PRN
Status: DISCONTINUED | OUTPATIENT
Start: 2024-11-21 | End: 2024-11-22 | Stop reason: HOSPADM

## 2024-11-21 RX ORDER — HYDROMORPHONE HYDROCHLORIDE 1 MG/ML
0.5 INJECTION, SOLUTION INTRAMUSCULAR; INTRAVENOUS; SUBCUTANEOUS EVERY 5 MIN PRN
Status: DISCONTINUED | OUTPATIENT
Start: 2024-11-21 | End: 2024-11-21 | Stop reason: HOSPADM

## 2024-11-21 RX ORDER — MICONAZOLE NITRATE 20 MG/G
CREAM TOPICAL DAILY
Status: DISCONTINUED | OUTPATIENT
Start: 2024-11-22 | End: 2024-11-22 | Stop reason: HOSPADM

## 2024-11-21 RX ORDER — SODIUM CHLORIDE 9 MG/ML
INJECTION, SOLUTION INTRAVENOUS CONTINUOUS
Status: DISCONTINUED | OUTPATIENT
Start: 2024-11-21 | End: 2024-11-22 | Stop reason: HOSPADM

## 2024-11-21 RX ORDER — SODIUM CHLORIDE 9 MG/ML
INJECTION, SOLUTION INTRAVENOUS PRN
Status: DISCONTINUED | OUTPATIENT
Start: 2024-11-21 | End: 2024-11-21 | Stop reason: HOSPADM

## 2024-11-21 RX ORDER — FAMOTIDINE 20 MG/1
20 TABLET, FILM COATED ORAL 2 TIMES DAILY
Status: DISCONTINUED | OUTPATIENT
Start: 2024-11-21 | End: 2024-11-22 | Stop reason: HOSPADM

## 2024-11-21 RX ORDER — PROPOFOL 10 MG/ML
INJECTION, EMULSION INTRAVENOUS
Status: DISCONTINUED | OUTPATIENT
Start: 2024-11-21 | End: 2024-11-21 | Stop reason: SDUPTHER

## 2024-11-21 RX ORDER — ACETAMINOPHEN 500 MG
1000 TABLET ORAL ONCE
Status: COMPLETED | OUTPATIENT
Start: 2024-11-21 | End: 2024-11-21

## 2024-11-21 RX ORDER — 0.9 % SODIUM CHLORIDE 0.9 %
500 INTRAVENOUS SOLUTION INTRAVENOUS ONCE AS NEEDED
Status: DISCONTINUED | OUTPATIENT
Start: 2024-11-21 | End: 2024-11-22 | Stop reason: HOSPADM

## 2024-11-21 RX ORDER — DEXMEDETOMIDINE HYDROCHLORIDE 100 UG/ML
INJECTION, SOLUTION INTRAVENOUS
Status: DISCONTINUED | OUTPATIENT
Start: 2024-11-21 | End: 2024-11-21 | Stop reason: SDUPTHER

## 2024-11-21 RX ORDER — DIPHENHYDRAMINE HCL 25 MG
25 CAPSULE ORAL NIGHTLY PRN
Status: DISCONTINUED | OUTPATIENT
Start: 2024-11-21 | End: 2024-11-22 | Stop reason: HOSPADM

## 2024-11-21 RX ORDER — BENZONATATE 100 MG/1
200 CAPSULE ORAL 3 TIMES DAILY PRN
Status: DISCONTINUED | OUTPATIENT
Start: 2024-11-21 | End: 2024-11-22 | Stop reason: HOSPADM

## 2024-11-21 RX ORDER — BISACODYL 10 MG
10 SUPPOSITORY, RECTAL RECTAL DAILY PRN
Status: DISCONTINUED | OUTPATIENT
Start: 2024-11-21 | End: 2024-11-22 | Stop reason: HOSPADM

## 2024-11-21 RX ORDER — GUAIFENESIN AND DEXTROMETHORPHAN HYDROBROMIDE 100; 10 MG/5ML; MG/5ML
5 SOLUTION ORAL EVERY 4 HOURS PRN
Status: DISCONTINUED | OUTPATIENT
Start: 2024-11-21 | End: 2024-11-22 | Stop reason: HOSPADM

## 2024-11-21 RX ORDER — FENTANYL CITRATE 50 UG/ML
25 INJECTION, SOLUTION INTRAMUSCULAR; INTRAVENOUS EVERY 5 MIN PRN
Status: DISCONTINUED | OUTPATIENT
Start: 2024-11-21 | End: 2024-11-21 | Stop reason: HOSPADM

## 2024-11-21 RX ORDER — DIPHENHYDRAMINE HCL 25 MG
25 CAPSULE ORAL EVERY 6 HOURS PRN
Status: DISCONTINUED | OUTPATIENT
Start: 2024-11-21 | End: 2024-11-22 | Stop reason: HOSPADM

## 2024-11-21 RX ORDER — MORPHINE SULFATE 2 MG/ML
2 INJECTION, SOLUTION INTRAMUSCULAR; INTRAVENOUS
Status: ACTIVE | OUTPATIENT
Start: 2024-11-21 | End: 2024-11-22

## 2024-11-21 RX ORDER — ACETAMINOPHEN 325 MG/1
650 TABLET ORAL EVERY 6 HOURS
Status: DISCONTINUED | OUTPATIENT
Start: 2024-11-21 | End: 2024-11-22 | Stop reason: HOSPADM

## 2024-11-21 RX ORDER — LIDOCAINE HYDROCHLORIDE 20 MG/ML
INJECTION, SOLUTION EPIDURAL; INFILTRATION; INTRACAUDAL; PERINEURAL
Status: DISCONTINUED | OUTPATIENT
Start: 2024-11-21 | End: 2024-11-21 | Stop reason: SDUPTHER

## 2024-11-21 RX ORDER — DIPHENHYDRAMINE HYDROCHLORIDE 50 MG/ML
25 INJECTION INTRAMUSCULAR; INTRAVENOUS EVERY 6 HOURS PRN
Status: DISCONTINUED | OUTPATIENT
Start: 2024-11-21 | End: 2024-11-22 | Stop reason: HOSPADM

## 2024-11-21 RX ORDER — PHENYLEPHRINE HCL IN 0.9% NACL 0.4MG/10ML
SYRINGE (ML) INTRAVENOUS
Status: DISCONTINUED | OUTPATIENT
Start: 2024-11-21 | End: 2024-11-21 | Stop reason: SDUPTHER

## 2024-11-21 RX ORDER — KETOROLAC TROMETHAMINE 30 MG/ML
15 INJECTION, SOLUTION INTRAMUSCULAR; INTRAVENOUS EVERY 6 HOURS SCHEDULED
Status: DISCONTINUED | OUTPATIENT
Start: 2024-11-21 | End: 2024-11-22 | Stop reason: HOSPADM

## 2024-11-21 RX ORDER — ONDANSETRON 4 MG/1
4 TABLET, ORALLY DISINTEGRATING ORAL EVERY 8 HOURS PRN
Status: DISCONTINUED | OUTPATIENT
Start: 2024-11-21 | End: 2024-11-22 | Stop reason: HOSPADM

## 2024-11-21 RX ORDER — ONDANSETRON 2 MG/ML
4 INJECTION INTRAMUSCULAR; INTRAVENOUS
Status: DISCONTINUED | OUTPATIENT
Start: 2024-11-21 | End: 2024-11-21 | Stop reason: HOSPADM

## 2024-11-21 RX ORDER — SUCCINYLCHOLINE/SOD CL,ISO/PF 200MG/10ML
SYRINGE (ML) INTRAVENOUS
Status: DISCONTINUED | OUTPATIENT
Start: 2024-11-21 | End: 2024-11-21 | Stop reason: SDUPTHER

## 2024-11-21 RX ORDER — PREGABALIN 75 MG/1
75 CAPSULE ORAL 2 TIMES DAILY
Status: DISCONTINUED | OUTPATIENT
Start: 2024-11-21 | End: 2024-11-22 | Stop reason: HOSPADM

## 2024-11-21 RX ORDER — TRANEXAMIC ACID 100 MG/ML
INJECTION, SOLUTION INTRAVENOUS
Status: DISCONTINUED | OUTPATIENT
Start: 2024-11-21 | End: 2024-11-21 | Stop reason: SDUPTHER

## 2024-11-21 RX ORDER — SODIUM CHLORIDE 0.9 % (FLUSH) 0.9 %
5-40 SYRINGE (ML) INJECTION PRN
Status: DISCONTINUED | OUTPATIENT
Start: 2024-11-21 | End: 2024-11-22 | Stop reason: HOSPADM

## 2024-11-21 RX ORDER — NALOXONE HYDROCHLORIDE 0.4 MG/ML
INJECTION, SOLUTION INTRAMUSCULAR; INTRAVENOUS; SUBCUTANEOUS PRN
Status: DISCONTINUED | OUTPATIENT
Start: 2024-11-21 | End: 2024-11-21 | Stop reason: HOSPADM

## 2024-11-21 RX ORDER — TROSPIUM CHLORIDE 20 MG/1
20 TABLET, FILM COATED ORAL NIGHTLY
Status: DISCONTINUED | OUTPATIENT
Start: 2024-11-21 | End: 2024-11-22 | Stop reason: HOSPADM

## 2024-11-21 RX ORDER — POLYETHYLENE GLYCOL 3350 17 G/17G
17 POWDER, FOR SOLUTION ORAL DAILY
Status: DISCONTINUED | OUTPATIENT
Start: 2024-11-21 | End: 2024-11-22 | Stop reason: HOSPADM

## 2024-11-21 RX ADMIN — FAMOTIDINE 20 MG: 20 TABLET, FILM COATED ORAL at 13:55

## 2024-11-21 RX ADMIN — RIVAROXABAN 20 MG: 20 TABLET, FILM COATED ORAL at 16:59

## 2024-11-21 RX ADMIN — PROPOFOL 40 MG: 10 INJECTION, EMULSION INTRAVENOUS at 08:12

## 2024-11-21 RX ADMIN — POLYETHYLENE GLYCOL 3350 17 G: 17 POWDER, FOR SOLUTION ORAL at 13:55

## 2024-11-21 RX ADMIN — DEXMEDETOMIDINE 8 MCG: 100 INJECTION, SOLUTION INTRAVENOUS at 08:23

## 2024-11-21 RX ADMIN — SENNOSIDES AND DOCUSATE SODIUM 1 TABLET: 50; 8.6 TABLET ORAL at 13:55

## 2024-11-21 RX ADMIN — ONDANSETRON 4 MG: 2 INJECTION INTRAMUSCULAR; INTRAVENOUS at 08:23

## 2024-11-21 RX ADMIN — FAMOTIDINE 20 MG: 20 TABLET, FILM COATED ORAL at 20:43

## 2024-11-21 RX ADMIN — TRAMADOL HYDROCHLORIDE 50 MG: 50 TABLET, COATED ORAL at 20:43

## 2024-11-21 RX ADMIN — WATER 2000 MG: 1 INJECTION INTRAMUSCULAR; INTRAVENOUS; SUBCUTANEOUS at 08:32

## 2024-11-21 RX ADMIN — TRANEXAMIC ACID 1000 MG: 100 INJECTION, SOLUTION INTRAVENOUS at 09:29

## 2024-11-21 RX ADMIN — ROCURONIUM BROMIDE 50 MG: 10 INJECTION INTRAVENOUS at 08:38

## 2024-11-21 RX ADMIN — Medication 80 MCG: at 08:50

## 2024-11-21 RX ADMIN — CELECOXIB 400 MG: 200 CAPSULE ORAL at 07:35

## 2024-11-21 RX ADMIN — ACETAMINOPHEN 1000 MG: 500 TABLET ORAL at 07:35

## 2024-11-21 RX ADMIN — HYDROMORPHONE HYDROCHLORIDE 0.5 MG: 1 INJECTION, SOLUTION INTRAMUSCULAR; INTRAVENOUS; SUBCUTANEOUS at 09:33

## 2024-11-21 RX ADMIN — DEXAMETHASONE SODIUM PHOSPHATE 8 MG: 4 INJECTION INTRA-ARTICULAR; INTRALESIONAL; INTRAMUSCULAR; INTRAVENOUS; SOFT TISSUE at 08:32

## 2024-11-21 RX ADMIN — PREGABALIN 75 MG: 75 CAPSULE ORAL at 20:43

## 2024-11-21 RX ADMIN — Medication 140 MG: at 08:28

## 2024-11-21 RX ADMIN — SODIUM CHLORIDE, PRESERVATIVE FREE 10 ML: 5 INJECTION INTRAVENOUS at 20:44

## 2024-11-21 RX ADMIN — ACETAMINOPHEN 650 MG: 325 TABLET ORAL at 13:55

## 2024-11-21 RX ADMIN — WATER 2000 MG: 1 INJECTION INTRAMUSCULAR; INTRAVENOUS; SUBCUTANEOUS at 16:59

## 2024-11-21 RX ADMIN — FENTANYL CITRATE 50 MCG: 50 INJECTION, SOLUTION INTRAMUSCULAR; INTRAVENOUS at 09:46

## 2024-11-21 RX ADMIN — HYDROMORPHONE HYDROCHLORIDE 1 MG: 1 INJECTION, SOLUTION INTRAMUSCULAR; INTRAVENOUS; SUBCUTANEOUS at 08:23

## 2024-11-21 RX ADMIN — PROPOFOL 150 MG: 10 INJECTION, EMULSION INTRAVENOUS at 08:26

## 2024-11-21 RX ADMIN — Medication 3 AMPULE: at 07:35

## 2024-11-21 RX ADMIN — SUGAMMADEX 200 MG: 100 INJECTION, SOLUTION INTRAVENOUS at 09:30

## 2024-11-21 RX ADMIN — TRANEXAMIC ACID 1000 MG: 100 INJECTION, SOLUTION INTRAVENOUS at 08:34

## 2024-11-21 RX ADMIN — SODIUM CHLORIDE, POTASSIUM CHLORIDE, SODIUM LACTATE AND CALCIUM CHLORIDE: 600; 310; 30; 20 INJECTION, SOLUTION INTRAVENOUS at 08:23

## 2024-11-21 RX ADMIN — TROSPIUM CHLORIDE 20 MG: 20 TABLET, FILM COATED ORAL at 20:43

## 2024-11-21 RX ADMIN — BUPIVACAINE HYDROCHLORIDE 30 ML: 2.5 INJECTION, SOLUTION EPIDURAL; INFILTRATION; INTRACAUDAL at 08:17

## 2024-11-21 RX ADMIN — SENNOSIDES AND DOCUSATE SODIUM 1 TABLET: 50; 8.6 TABLET ORAL at 20:43

## 2024-11-21 RX ADMIN — KETOROLAC TROMETHAMINE 15 MG: 30 INJECTION, SOLUTION INTRAMUSCULAR at 16:58

## 2024-11-21 RX ADMIN — HYDROMORPHONE HYDROCHLORIDE 0.5 MG: 1 INJECTION, SOLUTION INTRAMUSCULAR; INTRAVENOUS; SUBCUTANEOUS at 09:38

## 2024-11-21 RX ADMIN — ACETAMINOPHEN 650 MG: 325 TABLET ORAL at 20:43

## 2024-11-21 RX ADMIN — LIDOCAINE HYDROCHLORIDE 100 MG: 20 INJECTION, SOLUTION EPIDURAL; INFILTRATION; INTRACAUDAL; PERINEURAL at 08:26

## 2024-11-21 ASSESSMENT — PAIN SCALES - GENERAL
PAINLEVEL_OUTOF10: 4
PAINLEVEL_OUTOF10: 0
PAINLEVEL_OUTOF10: 3
PAINLEVEL_OUTOF10: 0

## 2024-11-21 ASSESSMENT — ENCOUNTER SYMPTOMS: SHORTNESS OF BREATH: 1

## 2024-11-21 ASSESSMENT — PAIN DESCRIPTION - DESCRIPTORS: DESCRIPTORS: ACHING

## 2024-11-21 ASSESSMENT — PAIN DESCRIPTION - LOCATION: LOCATION: LEG

## 2024-11-21 ASSESSMENT — PAIN DESCRIPTION - ORIENTATION: ORIENTATION: RIGHT

## 2024-11-21 NOTE — ANESTHESIA PRE PROCEDURE
Department of Anesthesiology  Preprocedure Note       Name:  Gunnar Lizarraga   Age:  84 y.o.  :  1940                                          MRN:  [id number]         Date:  2024      Surgeon: Surgeon(s):  Torsten Barone MD    Procedure: Procedure(s):  RIGHT TOTAL KNEE ARTHROPLASTY WITH NAVIGATION, LEFT KNEE INJECTION    Medications prior to admission:   Prior to Admission medications    Medication Sig Start Date End Date Taking? Authorizing Provider   amLODIPine (NORVASC) 5 MG tablet Take 0.5 tablets by mouth daily 24 Yes Milton Hubbard MD   acetaminophen (TYLENOL) 500 MG tablet Take 1 tablet by mouth every 6 hours as needed for Pain   Yes Min Javier MD   GEMTESA 75 MG TABS tablet Take 1 tablet by mouth daily   Yes Min Javier MD   benzonatate (TESSALON) 200 MG capsule Take 1 capsule by mouth 3 times daily as needed for Cough 24  Yes Milton Hubbard MD   pravastatin (PRAVACHOL) 40 MG tablet TAKE 1 TABLET BY MOUTH DAILY 24  Yes Milton Hubbard MD   docusate sodium (COLACE) 100 MG capsule Take 1 capsule by mouth daily   Yes Min Javier MD   Vitamin D-Vitamin K (VITAMIN K2-VITAMIN D3 PO) Take 5,000 Units by mouth daily Only Monday-Friday    Min Javier MD   LINIMENTS EX Apply topically as needed (pain) Horse Liniment    Min Javier MD   terbinafine (LAMISIL) 250 MG tablet Take 1 tablet by mouth three times a week    Min Javier MD   zinc 50 MG TABS tablet Take 1 tablet by mouth daily    Min Javier MD   pregabalin (LYRICA) 75 MG capsule Take 1 capsule by mouth 2 times daily. Max Daily Amount: 150 mg    Min Javier MD   capsaicin-menthol-methyl sal 0.025-1-12 % CREA Apply topically as needed (pain)    Min Javier MD   diphenhydrAMINE (BENADRYL) 25 MG capsule Take 1 capsule by mouth nightly as needed for Sleep    Min Javier MD   Dextromethorphan-guaiFENesin

## 2024-11-21 NOTE — PLAN OF CARE
Problem: Physical Therapy - Adult  Goal: By Discharge: Performs mobility at highest level of function for planned discharge setting.  See evaluation for individualized goals.  Description: FUNCTIONAL STATUS PRIOR TO ADMISSION: Patient was modified independent using a single point cane for functional mobility.    HOME SUPPORT PRIOR TO ADMISSION: The patient lived with wife but did not require assistance.    Physical Therapy Goals  Initiated 11/21/2024  1.  Patient will move from supine to sit and sit to supine, scoot up and down, and roll side to side in bed with independence within 4 day(s).    2.  Patient will perform sit to stand with modified independence within 4 day(s).  3.  Patient will transfer from bed to chair and chair to bed with modified independence using the least restrictive device within 4 day(s).  4.  Patient will ambulate with modified independence for 200 feet with the least restrictive device within 4 day(s).   5.  Patient will ascend/descend 16 stairs with 1 handrail(s) with supervision/set-up within 4 day(s).  6. Patient will perform TKR home exercise program per protocol with modified independence within 4 days.  7. Patient will demonstrate AROM 0-90 degrees in operative joint within 4 days.     PHYSICAL THERAPY EVALUATION    Patient: Gunnar Lizarraga (84 y.o. male)  Date: 11/21/2024  Primary Diagnosis: Localized osteoarthritis of right knee [M17.11]  Localized osteoarthritis of left knee [M17.12]  S/P total knee arthroplasty, right [Z96.651]  Procedure(s) (LRB):  RIGHT TOTAL KNEE ARTHROPLASTY WITH NAVIGATION, LEFT KNEE INJECTION (Right) Day of Surgery   Precautions: Restrictions/Precautions: Weight Bearing   Lower Extremity Weight Bearing Restrictions  Right Lower Extremity Weight Bearing: Weight Bearing As Tolerated                  ASSESSMENT :   Patient seen for PT evaluation following s/p R TKR and L knee injection today. Limited evaluation today secondary orthostatic  hypotension.  Vitals:    11/21/24 1240 11/21/24 1524 11/21/24 1538 11/21/24 1542   BP: (!) 170/100 (!) 155/92 109/77 (!) 153/92   Pulse: 83 83 97 85                 position supine sitting standing supine                          Patient presents with impaired functional mobility and balance. He is able to complete bed mobility with SBA but required mod A of 2 to transition to standing. Patient side stepped 2 feet to head of bed using RW and returned to sitting with CGA.  Patient reports modified independence at baseline and has required SPC recently for all ambulation secondary to B knee pain.  Anticipate good progress with therapy once BP stabilizes and return to home with HHPT.         PLAN :  Recommendations and Planned Interventions:   bed mobility training, transfer training, gait training, therapeutic exercises, patient and family training/education, and therapeutic activities    Frequency/Duration: Patient will be followed by physical therapy to address goals, PT Plan of Care: BID to address goals.      Recommendation for discharge: (in order for the patient to meet his/her long term goals):   Intermittent physical therapy up to 2-3x/week in previous living setting    Other factors to consider for discharge: no additional factors    IF patient discharges home will need the following DME: patient owns DME required for discharge                SUBJECTIVE:   Patient stated “I feel a little funny but I'm not dizzy.”    OBJECTIVE DATA SUMMARY:       Past Medical History:   Diagnosis Date    Allergic rhinitis 10/03/2017    Arthritis     At risk for sleep apnea 11/13/2024    STOP-BANG 5    Current use of long term anticoagulation 10/03/2017    Duodenitis 10/03/2017    Eczema 10/03/2017    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 Leung    Factor V Leiden mutation (HCC) 10/03/2017    Gastroesophageal reflux disease without esophagitis

## 2024-11-21 NOTE — PROGRESS NOTES
Ortho / Neurosurgery Progress Note    POD# 0  s/p RIGHT TOTAL KNEE ARTHROPLASTY WITH NAVIGATION, LEFT KNEE INJECTION   Pt seen with his wife at bedside, reports no pain to the knee, states he feels like his bladder is full. He was straight cath X1 in pacu with 900ml, pt has a history of overactive bladder, he voided 100ml on his own but bladder scan still shows 755ccs.     Patient in bed    VSS Afebrile.    Visit Vitals  /96   Pulse 86   Temp 98.4 °F (36.9 °C)   Resp 16   Ht 1.829 m (6')   Wt 75.6 kg (166 lb 10.7 oz)   SpO2 94%   BMI 22.60 kg/m²               Labs    Lab Results   Component Value Date/Time    HGB 13.8 11/11/2024 10:33 AM      Lab Results   Component Value Date/Time    INR 1.1 11/11/2024 10:33 AM      Lab Results   Component Value Date/Time     11/11/2024 10:33 AM    K 4.4 11/11/2024 10:33 AM     11/11/2024 10:33 AM    CO2 32 11/11/2024 10:33 AM    BUN 14 11/11/2024 10:33 AM     Recent Glucose Results:   Glucose   Date Value Ref Range Status   11/11/2024 93 65 - 100 mg/dL Final   05/28/2024 98 65 - 100 mg/dL Final   12/28/2023 103 (H) 65 - 100 mg/dL Final           Body mass index is 22.6 kg/m². : A BMI > 30 is classified as obesity and > 40 is classified as morbid obesity.     Awake and alert. No acute distress.    Dressing: Silver Dressing C.D.I.   No significant erythema or swelling  Cryotherapy in place over incision.   BLE sensation to light touch intact  BLE motor intact. Strength 5/5    SCD for mechanical DVT proph while in bed        PLAN:  1) PT BID - WBAT  2) DVT Prophylaxis: Xarelto   3) GI Prophylaxis - pepcid  4) Pain control - scheduled tylenol  and toradol, and prn  tramadol    5) Readiness for discharge:     [x] Vital Signs stable    [x] Labs stable    [] + Voiding    [x] Wound intact, drainage minimal    [x] Tolerating PO intake     [] Cleared by PT (OT if applicable) for discharge   [x] Adequate pain control on oral medication alone        Discharge Plan: Home with

## 2024-11-21 NOTE — FLOWSHEET NOTE
11/21/24 1008   Handoff   Communication Given Periop Handoff/Relief   Handoff phase Phase I receiving   Handoff Given To Edwina CARDENAS   Handoff Received From Ayo WILLINGHAM & Makenzie CARDENAS   Handoff Communication Face to Face   Time Handoff Given 0954         1012 BP >200, no c/o right knee pain.  Patient voided 100 clear yellow urine urinal, however still feels urge to void, & pressure. Notified Dr. Elizalde, received prn order for Labetalol 10mg IV .  Bladder scanned >200 , IC for 900 ml yellow urine with blood clot pt reports pressure relief, /89.    1118        TRANSFER - OUT REPORT:    Verbal report given to María CARDENAS  on Gunnar Lizarraga  being transferred to ortho 108(unit) for routine post-op       Report consisted of patient’s Situation, Background, Assessment and   Recommendations(SBAR).     Information from the following report(s) Surgery Report, Intake/Output, MAR, and Cardiac Rhythm SR  was reviewed with the receiving nurse.    Opportunity for questions and clarification was provided.      Patient transported with:   Tech    Lines:    This SmartLink retrieves the last documented value for LDA assessment data, retrieved by either LDA type or by LDA group ID.     This SmartLink should be used in a SmartText or SmartPhrase. If one is not available, please contact your .

## 2024-11-21 NOTE — ANESTHESIA POSTPROCEDURE EVALUATION
Department of Anesthesiology  Postprocedure Note    Patient: Gunnar Lizarraga  MRN: [id number]  YOB: 1940  Date of evaluation: 11/21/2024    Procedure Summary       Date: 11/21/24 Room / Location: [facility] MAIN OR 7 / [facility] MAIN OR    Anesthesia Start: 0823 Anesthesia Stop: 0956    Procedure: RIGHT TOTAL KNEE ARTHROPLASTY WITH NAVIGATION, LEFT KNEE INJECTION (Right: Knee) Diagnosis:       Localized osteoarthritis of right knee      Localized osteoarthritis of left knee      (Localized osteoarthritis of right knee [M17.11])      (Localized osteoarthritis of left knee [M17.12])    Providers: Torsten Barone MD Responsible Provider: Stanley Elizalde MD    Anesthesia Type: General, Regional ASA Status: 3            Anesthesia Type: General, Regional    Moses Phase I: Moses Score: 10    Moses Phase II:      Anesthesia Post Evaluation    Patient location during evaluation: PACU  Patient participation: complete - patient participated  Level of consciousness: awake and alert  Airway patency: patent  Nausea & Vomiting: no nausea  Cardiovascular status: hemodynamically stable  Respiratory status: acceptable  Hydration status: euvolemic  Multimodal analgesia pain management approach  Pain management: adequate        No notable events documented.

## 2024-11-21 NOTE — PROGRESS NOTES
Miconazole 2 % cream interchanged for econazole 1% cream per [facility] therapeutic interchange policy

## 2024-11-21 NOTE — H&P
Date of Surgery Update:  Gunnar Lizarraga was seen and examined on the day of surgery prior to the procedure.    There were no significant clinical changes since the completion of the History and Physical.    See PCP/Cardiology/PAT/Anesthesia record for cardiopulmonary evaluation.  Exam today prior to surgery showed no acute cardiac findings, no respiratory difficulty, and no abdominal complaints or pain.       Signed By: Torsten Barone MD     November 21, 2024 7:59 AM

## 2024-11-21 NOTE — ANESTHESIA PROCEDURE NOTES
Peripheral Block    Patient location during procedure: holding area  Reason for block: post-op pain management and at surgeon's request  Start time: 11/21/2024 8:00 AM  End time: 11/21/2024 8:10 AM  Staffing  Performed: anesthesiologist   Anesthesiologist: Stanley Elizalde MD  Performed by: Stanley Elizalde MD  Authorized by: Stanley Elizalde MD    Preanesthetic Checklist  Completed: patient identified, IV checked, site marked, risks and benefits discussed, surgical/procedural consents, equipment checked, pre-op evaluation, timeout performed, anesthesia consent given, oxygen available, monitors applied/VS acknowledged, fire risk safety assessment completed and verbalized and blood product R/B/A discussed and consented  Peripheral Block   Patient position: supine  Prep: ChloraPrep  Provider prep: mask and sterile gloves  Patient monitoring: cardiac monitor, continuous pulse ox, continuous capnometry, frequent blood pressure checks, IV access, oxygen and responsive to questions  Block type: Femoral  Adductor canal  Laterality: right  Injection technique: single-shot  Guidance: ultrasound guided    Needle   Needle type: insulated echogenic nerve stimulator needle   Needle gauge: 20 G  Needle localization: ultrasound guidance  Needle length: 10 cm  Assessment   Injection assessment: no paresthesia on injection, negative aspiration for heme, local visualized surrounding nerve on ultrasound and no intravascular symptoms  Paresthesia pain: none  Slow fractionated injection: yes  Hemodynamics: stable  Outcomes: uncomplicated and patient tolerated procedure well    Additional Notes  With nerve block to vastus medialis and inferomedial,superomedial and superolateral genicular nerve blocks utilizing ultrasound guidance

## 2024-11-21 NOTE — FLOWSHEET NOTE
11/21/24 1111   Family Communication    Relationship to Patient Spouse    Phone Number Naty   Family/Significant Other Update Called   Delivery Origin Nurse   Message Disposition Other (comment)   Update Given Yes   Family Communication   Family Update Message Other (comment)

## 2024-11-21 NOTE — PROGRESS NOTES
Trospium 20 mg q 24 h interchanged for Vibegron per [facility] therapeutic interchange policy

## 2024-11-21 NOTE — OP NOTE
PREOPERATIVE DIAGNOSIS:  Bilateral knee degenerative joint disease    POSTOPERATIVE DIAGNOSIS:  Same    PROCEDURE: Right total knee replacement with imageless computer navigation; Left knee injection    Implants Used: [employer] Truliant Pressfit Femur size 4CR, Truliant Pressfit Tibia size 4, 10mm Vit-E CRC poly, two 6.5mm screws    Implant Name Type Inv. Item Serial No.  Lot No. LRB No. Used Action   SCREW BONE L50MM DIA6.5MM FOR NOVATION CRWN CUP ACET SHELL - [id number] Screw/Plate/Nail/Diogo SCREW BONE L50MM DIA6.5MM FOR NOVATION CRWN CUP ACET SHELL [id number] [employer]WD N/A Right 1 Implanted   SCREW BONE L50MM DIA6.5MM FOR NOVATION CRWN CUP ACET SHELL - [id number] Screw/Plate/Nail/Diogo SCREW BONE L50MM DIA6.5MM FOR NOVATION CRWN CUP ACET SHELL [id number] [employer] N/A Right 1 Implanted   COMPONENT TIB TY 4F/4T KNEE POROUS TRULIANT - [id number] Joint Component COMPONENT TIB TY 4F/4T KNEE POROUS TRULIANT [id number] [employer] N/A Right 1 Implanted   COMPONENT FEM CR 4 RT KNEE POROUS TRULIANT - [id number] Joint Component COMPONENT FEM CR 4 RT KNEE POROUS TRULIANT [id number] [employer] N/A Right 1 Implanted   INSERT TIB ACTIVIT-E KNEE CR CONSTRND STRL TRULIANT SZ 4 10MM - [id number] Joint Component INSERT TIB ACTIVIT-E KNEE CR CONSTRND STRL TRULIANT SZ 4 10MM [id number] [employer] N/A Right 1 Implanted       Anesthesia: General + block / local    Surgeon: Torsten Barone     Assistant: CONNOR Mccullough (Performing all or most of the following assistant-at-surgery services including but not limited to: proper patient positioning, sterile/prep and draping, placement of instruments/trackers, operative exposure, minor portions of bone / soft tissue excision, final irrigation and debridement, deep and superficial closure, application of final dressings)    EBL: minimal    Drains: none     Specimens: none     Complications: none     Condition: stable to PACU     INDICATIONS: Longstanding knee pain unresponsive

## 2024-11-21 NOTE — CARE COORDINATION
CM completed chart review; pt presented for pre-planned surgery. [facility] health had been referred during preop planning, CM opened referral in [facility], sending order and clinicals when available, AVS updated with contact information for HH. Per chart review, pt has own RW. CM following for additions or changes to discharge plan.       Transportation for d/c: spouse, family   Support post d/c: spouse, family   Does pt own DME needed for d/c: yes, RW   Accepting HH agency: [facility]   o [facility] offered: arranged preop      Jennifer Priest LMSW  Care Management  [phone]

## 2024-11-21 NOTE — PLAN OF CARE
Problem: Safety - Adult  Goal: Free from fall injury  Outcome: Progressing  Flowsheets (Taken 11/21/2024 1140)  Free From Fall Injury: Instruct family/caregiver on patient safety     Problem: Physical Therapy - Adult  Goal: By Discharge: Performs mobility at highest level of function for planned discharge setting.  See evaluation for individualized goals.  Description: FUNCTIONAL STATUS PRIOR TO ADMISSION: Patient was modified independent using a single point cane for functional mobility.    HOME SUPPORT PRIOR TO ADMISSION: The patient lived with wife but did not require assistance.    Physical Therapy Goals  Initiated 11/21/2024  1.  Patient will move from supine to sit and sit to supine, scoot up and down, and roll side to side in bed with independence within 4 day(s).    2.  Patient will perform sit to stand with modified independence within 4 day(s).  3.  Patient will transfer from bed to chair and chair to bed with modified independence using the least restrictive device within 4 day(s).  4.  Patient will ambulate with modified independence for 200 feet with the least restrictive device within 4 day(s).   5.  Patient will ascend/descend 16 stairs with 1 handrail(s) with supervision/set-up within 4 day(s).  6. Patient will perform TKR home exercise program per protocol with modified independence within 4 days.  7. Patient will demonstrate AROM 0-90 degrees in operative joint within 4 days.     11/21/2024 1608 by Marleny Baxter, PT  Outcome: Progressing

## 2024-11-22 VITALS
SYSTOLIC BLOOD PRESSURE: 144 MMHG | OXYGEN SATURATION: 95 % | TEMPERATURE: 97.3 F | HEIGHT: 72 IN | DIASTOLIC BLOOD PRESSURE: 91 MMHG | HEART RATE: 74 BPM | BODY MASS INDEX: 22.57 KG/M2 | RESPIRATION RATE: 16 BRPM | WEIGHT: 166.67 LBS

## 2024-11-22 LAB
ANION GAP SERPL CALC-SCNC: 4 MMOL/L (ref 2–12)
BUN SERPL-MCNC: 18 MG/DL (ref 6–20)
BUN/CREAT SERPL: 17 (ref 12–20)
CALCIUM SERPL-MCNC: 9.4 MG/DL (ref 8.5–10.1)
CHLORIDE SERPL-SCNC: 101 MMOL/L (ref 97–108)
CO2 SERPL-SCNC: 27 MMOL/L (ref 21–32)
CREAT SERPL-MCNC: 1.07 MG/DL (ref 0.7–1.3)
GLUCOSE SERPL-MCNC: 155 MG/DL (ref 65–100)
HCT VFR BLD AUTO: 37 % (ref 36.6–50.3)
HGB BLD-MCNC: 12.2 G/DL (ref 12.1–17)
POTASSIUM SERPL-SCNC: 4.1 MMOL/L (ref 3.5–5.1)
SODIUM SERPL-SCNC: 132 MMOL/L (ref 136–145)

## 2024-11-22 PROCEDURE — 80048 BASIC METABOLIC PNL TOTAL CA: CPT

## 2024-11-22 PROCEDURE — 97530 THERAPEUTIC ACTIVITIES: CPT

## 2024-11-22 PROCEDURE — 85018 HEMOGLOBIN: CPT

## 2024-11-22 PROCEDURE — 6360000002 HC RX W HCPCS: Performed by: ORTHOPAEDIC SURGERY

## 2024-11-22 PROCEDURE — 97116 GAIT TRAINING THERAPY: CPT

## 2024-11-22 PROCEDURE — 51798 US URINE CAPACITY MEASURE: CPT

## 2024-11-22 PROCEDURE — 36415 COLL VENOUS BLD VENIPUNCTURE: CPT

## 2024-11-22 PROCEDURE — 6370000000 HC RX 637 (ALT 250 FOR IP): Performed by: ORTHOPAEDIC SURGERY

## 2024-11-22 PROCEDURE — 85014 HEMATOCRIT: CPT

## 2024-11-22 PROCEDURE — 2580000003 HC RX 258: Performed by: ORTHOPAEDIC SURGERY

## 2024-11-22 RX ORDER — TRAMADOL HYDROCHLORIDE 50 MG/1
50 TABLET ORAL EVERY 6 HOURS PRN
Qty: 28 TABLET | Refills: 0 | Status: SHIPPED | OUTPATIENT
Start: 2024-11-22 | End: 2024-11-29

## 2024-11-22 RX ORDER — SENNA AND DOCUSATE SODIUM 50; 8.6 MG/1; MG/1
1 TABLET, FILM COATED ORAL 2 TIMES DAILY
Qty: 11 TABLET | Refills: 0 | Status: SHIPPED | OUTPATIENT
Start: 2024-11-22 | End: 2024-11-28

## 2024-11-22 RX ORDER — FAMOTIDINE 20 MG/1
20 TABLET, FILM COATED ORAL 2 TIMES DAILY
Qty: 60 TABLET | Refills: 3 | Status: SHIPPED | OUTPATIENT
Start: 2024-11-22

## 2024-11-22 RX ADMIN — KETOROLAC TROMETHAMINE 15 MG: 30 INJECTION, SOLUTION INTRAMUSCULAR at 00:36

## 2024-11-22 RX ADMIN — FAMOTIDINE 20 MG: 20 TABLET, FILM COATED ORAL at 08:20

## 2024-11-22 RX ADMIN — TERBINAFINE 250 MG: 250 TABLET ORAL at 08:19

## 2024-11-22 RX ADMIN — WATER 2000 MG: 1 INJECTION INTRAMUSCULAR; INTRAVENOUS; SUBCUTANEOUS at 00:36

## 2024-11-22 RX ADMIN — AMLODIPINE BESYLATE 2.5 MG: 2.5 TABLET ORAL at 08:21

## 2024-11-22 RX ADMIN — ACETAMINOPHEN 650 MG: 325 TABLET ORAL at 00:36

## 2024-11-22 RX ADMIN — SENNOSIDES AND DOCUSATE SODIUM 1 TABLET: 50; 8.6 TABLET ORAL at 08:20

## 2024-11-22 RX ADMIN — PREGABALIN 75 MG: 75 CAPSULE ORAL at 08:20

## 2024-11-22 RX ADMIN — KETOROLAC TROMETHAMINE 15 MG: 30 INJECTION, SOLUTION INTRAMUSCULAR at 06:44

## 2024-11-22 RX ADMIN — TRAMADOL HYDROCHLORIDE 100 MG: 50 TABLET, COATED ORAL at 06:42

## 2024-11-22 RX ADMIN — POLYETHYLENE GLYCOL 3350 17 G: 17 POWDER, FOR SOLUTION ORAL at 08:20

## 2024-11-22 RX ADMIN — ACETAMINOPHEN 650 MG: 325 TABLET ORAL at 08:20

## 2024-11-22 RX ADMIN — SODIUM CHLORIDE, PRESERVATIVE FREE 10 ML: 5 INJECTION INTRAVENOUS at 08:21

## 2024-11-22 ASSESSMENT — PAIN DESCRIPTION - ORIENTATION
ORIENTATION: RIGHT
ORIENTATION: RIGHT

## 2024-11-22 ASSESSMENT — PAIN SCALES - GENERAL
PAINLEVEL_OUTOF10: 5
PAINLEVEL_OUTOF10: 6

## 2024-11-22 ASSESSMENT — PAIN DESCRIPTION - DESCRIPTORS: DESCRIPTORS: ACHING

## 2024-11-22 ASSESSMENT — PAIN DESCRIPTION - PAIN TYPE: TYPE: ACUTE PAIN

## 2024-11-22 ASSESSMENT — PAIN DESCRIPTION - FREQUENCY: FREQUENCY: INTERMITTENT

## 2024-11-22 ASSESSMENT — PAIN - FUNCTIONAL ASSESSMENT: PAIN_FUNCTIONAL_ASSESSMENT: PREVENTS OR INTERFERES SOME ACTIVE ACTIVITIES AND ADLS

## 2024-11-22 ASSESSMENT — PAIN DESCRIPTION - LOCATION
LOCATION: KNEE
LOCATION: KNEE

## 2024-11-22 ASSESSMENT — PAIN DESCRIPTION - ONSET: ONSET: SUDDEN

## 2024-11-22 NOTE — PLAN OF CARE
Problem: Safety - Adult  Goal: Free from fall injury  11/22/2024 0115 by Ming Latham RN  Outcome: Progressing  11/21/2024 1751 by Maryjane Roberts LPN  Outcome: Progressing  Flowsheets (Taken 11/21/2024 1140)  Free From Fall Injury: Instruct family/caregiver on patient safety     Problem: Physical Therapy - Adult  Goal: By Discharge: Performs mobility at highest level of function for planned discharge setting.  See evaluation for individualized goals.  Description: FUNCTIONAL STATUS PRIOR TO ADMISSION: Patient was modified independent using a single point cane for functional mobility.    HOME SUPPORT PRIOR TO ADMISSION: The patient lived with wife but did not require assistance.    Physical Therapy Goals  Initiated 11/21/2024  1.  Patient will move from supine to sit and sit to supine, scoot up and down, and roll side to side in bed with independence within 4 day(s).    2.  Patient will perform sit to stand with modified independence within 4 day(s).  3.  Patient will transfer from bed to chair and chair to bed with modified independence using the least restrictive device within 4 day(s).  4.  Patient will ambulate with modified independence for 200 feet with the least restrictive device within 4 day(s).   5.  Patient will ascend/descend 16 stairs with 1 handrail(s) with supervision/set-up within 4 day(s).  6. Patient will perform TKR home exercise program per protocol with modified independence within 4 days.  7. Patient will demonstrate AROM 0-90 degrees in operative joint within 4 days.     11/21/2024 1608 by Marleny Baxter, PT  Outcome: Progressing     Problem: Respiratory - Adult  Goal: Achieves optimal ventilation and oxygenation  Outcome: Progressing     Problem: Cardiovascular - Adult  Goal: Maintains optimal cardiac output and hemodynamic stability  Outcome: Progressing     Problem: Skin/Tissue Integrity - Adult  Goal: Skin integrity remains intact  Outcome: Progressing  Goal: Incisions, wounds,

## 2024-11-22 NOTE — PROGRESS NOTES
End of Shift Note    Bedside shift change report given to THERESA Loza (oncoming nurse) by Ming Latham RN (offgoing nurse).  Report included the following information SBAR, Kardex, Intake/Output, Recent Results, and Pre Procedure Checklist    Shift worked:  7pm-7am     Shift summary and any significant changes:    Pt is A&Ox4  on room air.   Voiding     Pain meds was effective. Slept well overnight. No any kind of acute distress or complaint noticed at this time.     Concerns for physician to address:  ..     Zone phone for oncoming shift:   ..       Activity:     Number times ambulated in hallways past shift: 0  Number of times OOB to chair past shift: 0    Cardiac:   Cardiac Monitoring: No     Cardiac Rhythm: Sinus rhythm    Access:  Current line(s): PIV     Genitourinary:   Urinary Status: Voiding    Respiratory:   O2 Device: None (Room air)  Chronic home O2 use?:   Incentive spirometer at bedside: YES    GI:     Current diet:  ADULT DIET; Regular  Passing flatus: YES    Pain Management:   Patient states pain is manageable on current regimen: YES    Skin:  Bladimir Scale Score: 19  Interventions: Wound Offloading (Prevention Methods): Bed, pressure reduction mattress, Pillows, Repositioning, Turning    Patient Safety:  Fall Risk:    Fall Risk Interventions  Toilet Every 2 Hours-In Advance of Need: Yes  Hourly Visual Checks: Awake  Fall Visual Posted: Armband  Room Door Open: Yes  Alarm On: Bed  Patient Moved Closer to Nursing Station: No    Active Consults:   IP CONSULT TO CASE MANAGEMENT    Length of Stay:  Expected LOS: 1  Actual LOS: 0    Ming Latham RN

## 2024-11-22 NOTE — DISCHARGE SUMMARY
Ortho Discharge Summary    Patient ID:  Gunnar Lizarraga  [id number]  male  84 y.o.  1940    Admit date: 11/21/2024    Discharge date: 11/22/2024    Admitting Physician: Torsten Barone MD     Consulting Physician(s):   Treatment Team:   Torsten Barone MD Harris, Barton, MD Blair, Juli Hodge, Erin, PTA    Date of Surgery:   11/21/2024     Preoperative Diagnosis:  Localized osteoarthritis of right knee [M17.11]  Localized osteoarthritis of left knee [M17.12]    Postoperative Diagnosis:   * No post-op diagnosis entered *    Procedure(s):   RIGHT TOTAL KNEE ARTHROPLASTY WITH NAVIGATION, LEFT KNEE INJECTION     Anesthesia Type:   General     Surgeon: Torsten Barone MD                            HPI:  Pt is a 84 y.o. male who has a history of Localized osteoarthritis of right knee [M17.11]  Localized osteoarthritis of left knee [M17.12]  with pain and limitations of activities of daily living who presents at this time for a right RIGHT TOTAL KNEE ARTHROPLASTY WITH NAVIGATION, LEFT KNEE INJECTION following the failure of conservative management.    PMH:   Past Medical History:   Diagnosis Date    Allergic rhinitis 10/03/2017    Arthritis     At risk for sleep apnea 11/13/2024    STOP-BANG 5    Current use of long term anticoagulation 10/03/2017    Duodenitis 10/03/2017    Eczema 10/03/2017    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 Leung    Factor V Leiden mutation (HCC) 10/03/2017    Gastroesophageal reflux disease without esophagitis 10/03/2017    Herpes zoster virus infection of face and ear nerves 10/03/2017    History of allergic rhinitis 10/03/2017    purulent    History of colonic polyps 10/03/2017    History of DVT (deep vein thrombosis)     DVT    History of prostate cancer     History of prostate cancer     Prostate    History of renal cell carcinoma 10/03/2017    History of toe fracture 10/03/2017    left sided     nightly as needed for Sleep   docusate sodium (COLACE) 100 MG capsule 2024  Yes Yes   Sig: Take 1 capsule by mouth daily   econazole nitrate 1 % cream 11/15/2024  Yes No   Sig: Apply topically   ibandronate (BONIVA) 150 MG tablet 2024  Yes No   Si tab(s) orally once a month for 90 days   pravastatin (PRAVACHOL) 40 MG tablet 2024  No Yes   Sig: TAKE 1 TABLET BY MOUTH DAILY   pregabalin (LYRICA) 75 MG capsule   Yes No   Sig: Take 1 capsule by mouth 2 times daily. Max Daily Amount: 150 mg   rivaroxaban (XARELTO) 20 MG TABS tablet 2024 at 1800  No No   Sig: Take 1 tablet by mouth daily   terbinafine (LAMISIL) 250 MG tablet 11/15/2024  Yes No   Sig: Take 1 tablet by mouth three times a week   vitamin B-12 (CYANOCOBALAMIN) 1000 MCG tablet 11/15/2024  Yes No   Sig: Take 1 tablet by mouth Every Day   vitamin C (ASCORBIC ACID) 500 MG tablet 11/15/2024  Yes No   Sig: Take 1 tablet by mouth daily   zinc 50 MG TABS tablet 11/15/2024  Yes No   Sig: Take 1 tablet by mouth daily      Facility-Administered Medications: None        Allergies:    Allergies   Allergen Reactions    Quinolones      Other reaction(s): Unknown (comments) MAY CAUSE ACHILLES tENDONITIS        Hospital Course:  The patient underwent surgery.  Complications:  None; patient tolerated the procedure well. Was taken to the PACU in stable condition and then transferred to the ortho floor.      Perioperative Antibiotics:  Ancef     Postoperative Pain Management:  Tramadol    DVT Prophylaxis:   Aspirin 81mg BID     Postoperative transfusions:    Number of units banked PRBCs =   none     Post Op complications: none    Hemoglobin at discharge:    Lab Results   Component Value Date/Time    HGB 12.2 2024 01:35 AM    INR 1.1 2024 10:33 AM         Incision - clean, dry and intact. No significant erythema or swelling. Wound appears to be healing without any evidence of infection. Neurovascular exam found to be within normal limits.

## 2024-11-22 NOTE — PROGRESS NOTES
Spiritual Care Partner Volunteer visited patient at [facility] in [facility] on 11/22/2024   Documented by:    CARLITOS Elkins  [facility]   Paging Service [phone])

## 2024-11-22 NOTE — PROGRESS NOTES
Ortho / Neurosurgery Progress Note    POD# 1  s/p RIGHT TOTAL KNEE ARTHROPLASTY WITH NAVIGATION, LEFT KNEE INJECTION   Pt seen with no visitors this morning, he is awake, states he feels much better. Was straight cath X2 yesterday but now voiding independently. Tolerating diet. Orthostatic with PT yesterday. Denies fever, chills, nausea, vomiting. Sob, chest or abdominal pain.      Patient in bed    VSS Afebrile.    Visit Vitals  BP (!) 166/88   Pulse 73   Temp 97.5 °F (36.4 °C) (Oral)   Resp 16   Ht 1.829 m (6')   Wt 75.6 kg (166 lb 10.7 oz)   SpO2 94%   BMI 22.60 kg/m²             Labs    Lab Results   Component Value Date/Time    HGB 12.2 11/22/2024 01:35 AM      Lab Results   Component Value Date/Time    INR 1.1 11/11/2024 10:33 AM      Lab Results   Component Value Date/Time     11/22/2024 01:35 AM    K 4.1 11/22/2024 01:35 AM     11/22/2024 01:35 AM    CO2 27 11/22/2024 01:35 AM    BUN 18 11/22/2024 01:35 AM     Recent Glucose Results:   Glucose   Date Value Ref Range Status   11/22/2024 155 (H) 65 - 100 mg/dL Final   11/11/2024 93 65 - 100 mg/dL Final   05/28/2024 98 65 - 100 mg/dL Final           Body mass index is 22.6 kg/m². : A BMI > 30 is classified as obesity and > 40 is classified as morbid obesity.     Awake and alert. No acute distress.    Dressing: Silver Dressing C.D.I.   No significant erythema or swelling  Cryotherapy in place over incision.   BLE sensation to light touch intact  BLE motor intact. Strength 5/5    SCD for mechanical DVT proph while in bed        PLAN:  1) PT BID - WBAT.   2) DVT Prophylaxis: Xarelto   3) GI Prophylaxis - pepcid  4) Pain control - scheduled tylenol  and toradol, and prn  tramadol    5) Readiness for discharge:     [x] Vital Signs stable    [x] Labs stable    [x] + Voiding    [x] Wound intact, drainage minimal    [x] Tolerating PO intake     [] Cleared by PT (OT if applicable) for discharge   [x] Adequate pain control on oral medication alone

## 2024-11-22 NOTE — PLAN OF CARE
Problem: Physical Therapy - Adult  Goal: By Discharge: Performs mobility at highest level of function for planned discharge setting.  See evaluation for individualized goals.  Description: FUNCTIONAL STATUS PRIOR TO ADMISSION: Patient was modified independent using a single point cane for functional mobility.    HOME SUPPORT PRIOR TO ADMISSION: The patient lived with wife but did not require assistance.    Physical Therapy Goals  Initiated 11/21/2024  1.  Patient will move from supine to sit and sit to supine, scoot up and down, and roll side to side in bed with independence within 4 day(s).    2.  Patient will perform sit to stand with modified independence within 4 day(s).  3.  Patient will transfer from bed to chair and chair to bed with modified independence using the least restrictive device within 4 day(s).  4.  Patient will ambulate with modified independence for 200 feet with the least restrictive device within 4 day(s).   5.  Patient will ascend/descend 16 stairs with 1 handrail(s) with supervision/set-up within 4 day(s).  6. Patient will perform TKR home exercise program per protocol with modified independence within 4 days.  7. Patient will demonstrate AROM 0-90 degrees in operative joint within 4 days.     Outcome: Adequate for Discharge   PHYSICAL THERAPY TREATMENT    Patient: Gunnar Lizarraga (84 y.o. male)  Date: 11/22/2024  Diagnosis: Localized osteoarthritis of right knee [M17.11]  Localized osteoarthritis of left knee [M17.12]  S/P total knee arthroplasty, right [Z96.651] S/P total knee arthroplasty, right  Procedure(s) (LRB):  RIGHT TOTAL KNEE ARTHROPLASTY WITH NAVIGATION, LEFT KNEE INJECTION (Right) 1 Day Post-Op  Precautions: Weight Bearing Right Lower Extremity Weight Bearing: Weight Bearing As Tolerated                    ASSESSMENT:  Patient continues to benefit from skilled PT services and is progressing towards goals. Pt received semi fowlers in bed, agreeable to participate in therapy. BP

## 2024-11-22 NOTE — PLAN OF CARE
Problem: Safety - Adult  Goal: Free from fall injury  11/22/2024 0941 by Dee Way RN  Outcome: Progressing  11/22/2024 0115 by Ming Latham RN  Outcome: Progressing     Problem: Respiratory - Adult  Goal: Achieves optimal ventilation and oxygenation  11/22/2024 0941 by Dee Way RN  Outcome: Progressing  11/22/2024 0115 by Ming Latham RN  Outcome: Progressing     Problem: Cardiovascular - Adult  Goal: Maintains optimal cardiac output and hemodynamic stability  11/22/2024 0941 by Dee Way RN  Outcome: Progressing  11/22/2024 0115 by Ming Latham RN  Outcome: Progressing     Problem: Skin/Tissue Integrity - Adult  Goal: Skin integrity remains intact  11/22/2024 0941 by Dee Way RN  Outcome: Progressing  11/22/2024 0115 by Ming Latham RN  Outcome: Progressing  Goal: Incisions, wounds, or drain sites healing without S/S of infection  11/22/2024 0941 by Dee Way RN  Outcome: Progressing  11/22/2024 0115 by Ming Latham RN  Outcome: Progressing     Problem: Musculoskeletal - Adult  Goal: Return mobility to safest level of function  11/22/2024 0941 by Dee Way RN  Outcome: Progressing  11/22/2024 0115 by Ming Latham RN  Outcome: Progressing  Goal: Maintain proper alignment of affected body part  11/22/2024 0941 by Dee Way RN  Outcome: Progressing  11/22/2024 0115 by Ming Latham RN  Outcome: Progressing  Goal: Return ADL status to a safe level of function  11/22/2024 0941 by Dee Way RN  Outcome: Progressing  11/22/2024 0115 by Ming Latham RN  Outcome: Progressing     Problem: Genitourinary - Adult  Goal: Absence of urinary retention  11/22/2024 0941 by Dee Way RN  Outcome: Progressing  11/22/2024 0115 by Ming Latham RN  Outcome: Progressing  Goal: Urinary catheter remains patent  11/22/2024 0941 by Spieth, Dee M, RN  Outcome: Progressing  11/22/2024 0115 by Ming Latham, THERESA  Outcome:

## 2024-11-22 NOTE — PROGRESS NOTES
DISCHARGE NOTE FROM ORTHO NURSE    Patient determined to be stable for discharge by attending provider. I have reviewed the discharge instructions with the patient. They verbalized understanding and all questions were answered to their satisfaction. No complaints or further questions were expressed.      Medications sent to pharmacy. Appropriate educational materials and medication side effect teaching were provided.      PIV were removed prior to discharge.     Patient did not discharge with any line, monsivais, or drain.    Personal items and valuables accounted for at discharge by patient and/or family: Yes    Post-op patient: Yes-Patient given post-op discharge kit and instructed on use.    Dee Way, RN

## 2024-11-22 NOTE — PLAN OF CARE
Problem: Safety - Adult  Goal: Free from fall injury  11/22/2024 1040 by Dee Way RN  Outcome: Adequate for Discharge  11/22/2024 0941 by Dee Way RN  Outcome: Progressing  11/22/2024 0115 by Ming Latham RN  Outcome: Progressing     Problem: Physical Therapy - Adult  Goal: By Discharge: Performs mobility at highest level of function for planned discharge setting.  See evaluation for individualized goals.  Description: FUNCTIONAL STATUS PRIOR TO ADMISSION: Patient was modified independent using a single point cane for functional mobility.    HOME SUPPORT PRIOR TO ADMISSION: The patient lived with wife but did not require assistance.    Physical Therapy Goals  Initiated 11/21/2024  1.  Patient will move from supine to sit and sit to supine, scoot up and down, and roll side to side in bed with independence within 4 day(s).    2.  Patient will perform sit to stand with modified independence within 4 day(s).  3.  Patient will transfer from bed to chair and chair to bed with modified independence using the least restrictive device within 4 day(s).  4.  Patient will ambulate with modified independence for 200 feet with the least restrictive device within 4 day(s).   5.  Patient will ascend/descend 16 stairs with 1 handrail(s) with supervision/set-up within 4 day(s).  6. Patient will perform TKR home exercise program per protocol with modified independence within 4 days.  7. Patient will demonstrate AROM 0-90 degrees in operative joint within 4 days.     11/22/2024 1011 by Pamela Lacey PTA  Outcome: Adequate for Discharge     Problem: Respiratory - Adult  Goal: Achieves optimal ventilation and oxygenation  11/22/2024 1040 by Dee Way RN  Outcome: Adequate for Discharge  11/22/2024 0941 by Dee Way RN  Outcome: Progressing  11/22/2024 0115 by Ming Latham RN  Outcome: Progressing     Problem: Cardiovascular - Adult  Goal: Maintains optimal cardiac output and hemodynamic  Discharge  11/22/2024 0941 by Dee Way RN  Outcome: Progressing  11/22/2024 0115 by Ming Latham RN  Outcome: Progressing     Problem: Infection - Adult  Goal: Absence of infection at discharge  11/22/2024 1040 by Dee Way RN  Outcome: Adequate for Discharge  11/22/2024 0941 by Dee Way RN  Outcome: Progressing  11/22/2024 0115 by Ming Latham RN  Outcome: Progressing  Goal: Absence of infection during hospitalization  11/22/2024 1040 by Dee Way RN  Outcome: Adequate for Discharge  11/22/2024 0941 by Dee Way RN  Outcome: Progressing  11/22/2024 0115 by Ming Latham RN  Outcome: Progressing     Problem: Pain  Goal: Verbalizes/displays adequate comfort level or baseline comfort level  11/22/2024 1040 by Dee Way RN  Outcome: Adequate for Discharge  11/22/2024 0941 by Dee Way RN  Outcome: Progressing

## 2024-11-27 ENCOUNTER — TELEPHONE (OUTPATIENT)
Age: 84
End: 2024-11-27

## 2024-12-03 ENCOUNTER — TELEPHONE (OUTPATIENT)
Age: 84
End: 2024-12-03

## 2024-12-03 NOTE — TELEPHONE ENCOUNTER
Caller States:  Symptoms/concern: HIGH BLOOD PRESSURE  Readings:  November 25: 156/94  November 26: 130/80  November 27: 156/91  November 28: 160/99  November 29: 147/91  November 30: 129/84  December 1:  136/88  December 2:  171/101  Symptoms  Light headache from time to time  Denies other associated symptoms  Denies vision changes  Denies nausea and vomiting  Onset:   11/25 noticed increase  Also note:  Pt had knee replacement surgery on 11/21      Appointments:  Appointment offers during call:  Not offered--deferred  to nurse due to headaches with increased hypertension    Date of last appointment:    11/11/2024     CALL PT TO ADVISE  [phone]

## 2024-12-10 ENCOUNTER — TELEPHONE (OUTPATIENT)
Age: 84
End: 2024-12-10

## 2024-12-10 NOTE — TELEPHONE ENCOUNTER
12-5   139/85    12-6  147/95  12-7   144/91  12-8   150/88  12-9    140/87  12-10  140/80    Pt's b/p readings and pt has more previously if needed.     Pt was told to call in with his numbers.       Please call pt to advise.     Pt talks of knee replacement that he had on 11-21 and the pain he is having could be contributing to the b/p.     Pt would like to know what all this means. Please call

## 2024-12-11 PROBLEM — Z01.818 PRE-OP EXAMINATION: Status: RESOLVED | Noted: 2024-11-11 | Resolved: 2024-12-11

## 2025-02-03 ENCOUNTER — TELEPHONE (OUTPATIENT)
Age: 85
End: 2025-02-03

## 2025-02-26 ENCOUNTER — OFFICE VISIT (OUTPATIENT)
Age: 85
End: 2025-02-26
Payer: MEDICARE

## 2025-02-26 VITALS
RESPIRATION RATE: 15 BRPM | HEIGHT: 72 IN | OXYGEN SATURATION: 95 % | HEART RATE: 75 BPM | WEIGHT: 166.2 LBS | TEMPERATURE: 97.9 F | BODY MASS INDEX: 22.51 KG/M2 | SYSTOLIC BLOOD PRESSURE: 131 MMHG | DIASTOLIC BLOOD PRESSURE: 71 MMHG

## 2025-02-26 DIAGNOSIS — C61 PROSTATIC CANCER (HCC): ICD-10-CM

## 2025-02-26 DIAGNOSIS — R41.3 MEMORY LOSS: ICD-10-CM

## 2025-02-26 DIAGNOSIS — J06.9 UPPER RESPIRATORY TRACT INFECTION, UNSPECIFIED TYPE: Primary | ICD-10-CM

## 2025-02-26 DIAGNOSIS — I10 PRIMARY HYPERTENSION: ICD-10-CM

## 2025-02-26 DIAGNOSIS — E55.9 VITAMIN D DEFICIENCY: ICD-10-CM

## 2025-02-26 DIAGNOSIS — I10 ESSENTIAL HYPERTENSION: ICD-10-CM

## 2025-02-26 DIAGNOSIS — I82.409 RECURRENT DEEP VEIN THROMBOSIS (DVT) (HCC): ICD-10-CM

## 2025-02-26 DIAGNOSIS — E78.00 HYPERCHOLESTEROLEMIA: ICD-10-CM

## 2025-02-26 LAB
INFLUENZA A ANTIGEN, POC: NEGATIVE
INFLUENZA B ANTIGEN, POC: NEGATIVE
Lab: NORMAL
QC PASS/FAIL: NORMAL
SARS-COV-2, POC: NORMAL
VALID INTERNAL CONTROL, POC: YES

## 2025-02-26 PROCEDURE — 99214 OFFICE O/P EST MOD 30 MIN: CPT | Performed by: STUDENT IN AN ORGANIZED HEALTH CARE EDUCATION/TRAINING PROGRAM

## 2025-02-26 PROCEDURE — 87502 INFLUENZA DNA AMP PROBE: CPT | Performed by: STUDENT IN AN ORGANIZED HEALTH CARE EDUCATION/TRAINING PROGRAM

## 2025-02-26 PROCEDURE — 87635 SARS-COV-2 COVID-19 AMP PRB: CPT | Performed by: STUDENT IN AN ORGANIZED HEALTH CARE EDUCATION/TRAINING PROGRAM

## 2025-02-26 RX ORDER — AZITHROMYCIN 250 MG/1
TABLET, FILM COATED ORAL
Qty: 6 TABLET | Refills: 0 | Status: SHIPPED | OUTPATIENT
Start: 2025-02-26 | End: 2025-03-08

## 2025-02-26 RX ORDER — AMLODIPINE BESYLATE 5 MG/1
5 TABLET ORAL DAILY
Qty: 90 TABLET | Refills: 1 | Status: SHIPPED | OUTPATIENT
Start: 2025-02-26

## 2025-02-26 RX ORDER — ALBUTEROL SULFATE 90 UG/1
2 INHALANT RESPIRATORY (INHALATION) 4 TIMES DAILY PRN
Qty: 54 G | Refills: 1 | Status: SHIPPED | OUTPATIENT
Start: 2025-02-26

## 2025-02-26 RX ORDER — ADHESIVE BANDAGE 3/4"
BANDAGE TOPICAL
Qty: 1 EACH | Refills: 0 | Status: SHIPPED | OUTPATIENT
Start: 2025-02-26

## 2025-02-26 ASSESSMENT — PATIENT HEALTH QUESTIONNAIRE - PHQ9
SUM OF ALL RESPONSES TO PHQ QUESTIONS 1-9: 1
SUM OF ALL RESPONSES TO PHQ QUESTIONS 1-9: 1
SUM OF ALL RESPONSES TO PHQ9 QUESTIONS 1 & 2: 1
1. LITTLE INTEREST OR PLEASURE IN DOING THINGS: NOT AT ALL
SUM OF ALL RESPONSES TO PHQ QUESTIONS 1-9: 1
SUM OF ALL RESPONSES TO PHQ QUESTIONS 1-9: 1
2. FEELING DOWN, DEPRESSED OR HOPELESS: SEVERAL DAYS

## 2025-02-26 NOTE — PROGRESS NOTES
HISTORY OF PRESENT ILLNESS   Gunnar Lizarraga is a 84 y.o. male     PMH of controlled HTN, controlled HLD, BPH w hx of prostate cancer and RCC (hx of radiation tx, followed by urology, Dr. 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 Torsten Barone and ?podiatry), vitamin b12 and D deficiencies, and ED.    Presents for htn and cholesterol    12/9/24  B12 1223, Mg 2.0, PTH 62  Na 141, K 4.1, cr 0.79  Alt 12, AST 14, Ca 9.3, alk phos 119, Tbili 0.4,       Headaches and congestion, coughing, mostly dry, starting about 4 days ago. Fatigue as well.   No fevers. No change in muscle aches.   No change in breathing.   No covid shot this year but did get flu.   No sick contacts  He has been trying to rest but he is not sleeping very well.   He is using tylenol, delsym, mucinex.             He is doing some light exercises. He does leg kicks and curls, and stair steps.   He can walk about 50  yards, limited by pain in feet.   He  is able to tolerate walking up 1 flight of stairs w/o shortness of breath or chest pain. He does one flight of stairs, 4 times per day. He does 10 minute on the stationary bike 6 days per week.       SOCIAL HISTORY:   he was  for [employer].  Lives with his wife Naty Lizarraga.  - Tobacco use: none  - Alcohol use: none  - Drug use: none    Objective:  Last Weight Metrics:      2/26/2025     2:09 PM 11/21/2024    11:47 AM 11/13/2024    10:17 AM 11/11/2024     8:57 AM 11/7/2024     9:18 AM 10/30/2024     8:32 AM 5/28/2024    11:10 AM   Weight Loss Metrics   Height 6' 0\" 6' 0\" 6' 0\" 6' 0.008\" 6' 0.008\" 6' 0\" 6' 0\"   Weight - Scale 166 lbs 3 oz 166 lbs 11 oz 166 lbs 11 oz 167 lbs 166 lbs 168 lbs 171 lbs   BMI (Calculated) 22.6 kg/m2 22.7 kg/m2 22.7 kg/m2 22.7 kg/m2 22.6 kg/m2 22.8 kg/m2 23.2 kg/m2     Vitals:    02/26/25 1409   BP: 131/71

## 2025-02-27 ENCOUNTER — HOSPITAL ENCOUNTER (OUTPATIENT)
Facility: HOSPITAL | Age: 85
Discharge: HOME | End: 2025-02-27
Payer: MEDICARE

## 2025-02-27 DIAGNOSIS — J06.9 UPPER RESPIRATORY TRACT INFECTION, UNSPECIFIED TYPE: ICD-10-CM

## 2025-02-27 PROCEDURE — 71046 X-RAY EXAM CHEST 2 VIEWS: CPT

## 2025-03-03 ENCOUNTER — TELEPHONE (OUTPATIENT)
Age: 85
End: 2025-03-03

## 2025-03-03 NOTE — TELEPHONE ENCOUNTER
Pt states would like a call from clinical staff in regards to x-ray result on 02/27/25.    Please call to discuss.

## 2025-03-04 ENCOUNTER — TELEPHONE (OUTPATIENT)
Age: 85
End: 2025-03-04

## 2025-03-19 ENCOUNTER — TRANSCRIBE ORDERS (OUTPATIENT)
Facility: HOSPITAL | Age: 85
End: 2025-03-19

## 2025-03-19 ENCOUNTER — HOSPITAL ENCOUNTER (OUTPATIENT)
Facility: HOSPITAL | Age: 85
Discharge: HOME | End: 2025-03-22
Payer: MEDICARE

## 2025-03-19 DIAGNOSIS — C64.9 MALIGNANT NEOPLASM OF KIDNEY EXCLUDING RENAL PELVIS, UNSPECIFIED LATERALITY (HCC): ICD-10-CM

## 2025-03-19 DIAGNOSIS — C64.9 MALIGNANT NEOPLASM OF KIDNEY EXCLUDING RENAL PELVIS, UNSPECIFIED LATERALITY (HCC): Primary | ICD-10-CM

## 2025-03-19 PROCEDURE — 71046 X-RAY EXAM CHEST 2 VIEWS: CPT

## 2025-05-21 ENCOUNTER — TELEPHONE (OUTPATIENT)
Age: 85
End: 2025-05-21

## 2025-05-21 NOTE — TELEPHONE ENCOUNTER
Pt has had constipation for 5 days.  Nothing is working.  Does doctor want to call something in or advise.    Pt had knee replacement last year and has a lot of pain yet.  Pt only takes tylenol and that isn't helping too much.  Pt has to get the other knee done as well.    Pt taking a spoon metamucil and stool softener daily, but the last five days there hasn't been anything.    Last 2 nights merilax     Suppository today and got a little out per wife.     Please call to advise on all.

## 2025-05-22 NOTE — TELEPHONE ENCOUNTER
Spoke to patient and patient states that his constipation has gotten better and he no longer needs an earlier appointment of it. Patient states okay to keep awv appointment date of 6/11 and he will call if anything changes. - pd

## 2025-05-28 RX ORDER — RIVAROXABAN 20 MG/1
20 TABLET, FILM COATED ORAL DAILY
Qty: 90 TABLET | Refills: 3 | Status: SHIPPED | OUTPATIENT
Start: 2025-05-28

## 2025-06-11 ENCOUNTER — OFFICE VISIT (OUTPATIENT)
Age: 85
End: 2025-06-11
Payer: MEDICARE

## 2025-06-11 VITALS
RESPIRATION RATE: 20 BRPM | DIASTOLIC BLOOD PRESSURE: 82 MMHG | HEIGHT: 72 IN | WEIGHT: 166 LBS | BODY MASS INDEX: 22.48 KG/M2 | HEART RATE: 58 BPM | OXYGEN SATURATION: 97 % | SYSTOLIC BLOOD PRESSURE: 136 MMHG | TEMPERATURE: 97.9 F

## 2025-06-11 DIAGNOSIS — E78.00 HYPERCHOLESTEROLEMIA: ICD-10-CM

## 2025-06-11 DIAGNOSIS — C61 PROSTATIC CANCER (HCC): ICD-10-CM

## 2025-06-11 DIAGNOSIS — I10 PRIMARY HYPERTENSION: ICD-10-CM

## 2025-06-11 DIAGNOSIS — I74.9 EMBOLISM AND THROMBOSIS OF UNSPECIFIED ARTERY (HCC): ICD-10-CM

## 2025-06-11 DIAGNOSIS — D68.51 FACTOR V LEIDEN MUTATION: ICD-10-CM

## 2025-06-11 DIAGNOSIS — K59.00 CONSTIPATION, UNSPECIFIED CONSTIPATION TYPE: ICD-10-CM

## 2025-06-11 DIAGNOSIS — Z00.00 MEDICARE ANNUAL WELLNESS VISIT, SUBSEQUENT: Primary | ICD-10-CM

## 2025-06-11 DIAGNOSIS — C64.9 RENAL CELL CARCINOMA, UNSPECIFIED LATERALITY (HCC): ICD-10-CM

## 2025-06-11 DIAGNOSIS — I82.409 RECURRENT DEEP VEIN THROMBOSIS (DVT) (HCC): ICD-10-CM

## 2025-06-11 DIAGNOSIS — E55.9 VITAMIN D DEFICIENCY: ICD-10-CM

## 2025-06-11 PROCEDURE — G8420 CALC BMI NORM PARAMETERS: HCPCS | Performed by: STUDENT IN AN ORGANIZED HEALTH CARE EDUCATION/TRAINING PROGRAM

## 2025-06-11 PROCEDURE — 99214 OFFICE O/P EST MOD 30 MIN: CPT | Performed by: STUDENT IN AN ORGANIZED HEALTH CARE EDUCATION/TRAINING PROGRAM

## 2025-06-11 PROCEDURE — 1159F MED LIST DOCD IN RCRD: CPT | Performed by: STUDENT IN AN ORGANIZED HEALTH CARE EDUCATION/TRAINING PROGRAM

## 2025-06-11 PROCEDURE — 3075F SYST BP GE 130 - 139MM HG: CPT | Performed by: STUDENT IN AN ORGANIZED HEALTH CARE EDUCATION/TRAINING PROGRAM

## 2025-06-11 PROCEDURE — G8427 DOCREV CUR MEDS BY ELIG CLIN: HCPCS | Performed by: STUDENT IN AN ORGANIZED HEALTH CARE EDUCATION/TRAINING PROGRAM

## 2025-06-11 PROCEDURE — 1036F TOBACCO NON-USER: CPT | Performed by: STUDENT IN AN ORGANIZED HEALTH CARE EDUCATION/TRAINING PROGRAM

## 2025-06-11 PROCEDURE — 1123F ACP DISCUSS/DSCN MKR DOCD: CPT | Performed by: STUDENT IN AN ORGANIZED HEALTH CARE EDUCATION/TRAINING PROGRAM

## 2025-06-11 PROCEDURE — 3079F DIAST BP 80-89 MM HG: CPT | Performed by: STUDENT IN AN ORGANIZED HEALTH CARE EDUCATION/TRAINING PROGRAM

## 2025-06-11 PROCEDURE — G0439 PPPS, SUBSEQ VISIT: HCPCS | Performed by: STUDENT IN AN ORGANIZED HEALTH CARE EDUCATION/TRAINING PROGRAM

## 2025-06-11 ASSESSMENT — PATIENT HEALTH QUESTIONNAIRE - PHQ9
SUM OF ALL RESPONSES TO PHQ QUESTIONS 1-9: 0
2. FEELING DOWN, DEPRESSED OR HOPELESS: NOT AT ALL
SUM OF ALL RESPONSES TO PHQ QUESTIONS 1-9: 0
1. LITTLE INTEREST OR PLEASURE IN DOING THINGS: NOT AT ALL

## 2025-06-11 ASSESSMENT — LIFESTYLE VARIABLES
HOW MANY STANDARD DRINKS CONTAINING ALCOHOL DO YOU HAVE ON A TYPICAL DAY: PATIENT DOES NOT DRINK
HOW OFTEN DO YOU HAVE A DRINK CONTAINING ALCOHOL: NEVER

## 2025-06-11 NOTE — PATIENT INSTRUCTIONS
Start taking miralax daily.       Preventing Falls: Care Instructions  Injuries and health problems such as trouble walking or poor eyesight can increase your risk of falling. So can some medicines. But there are things you can do to help prevent falls. You can exercise to get stronger. You can also arrange your home to make it safer.    Talk to your doctor about the medicines you take. Ask if any of them increase the risk of falls and whether they can be changed or stopped.   Try to exercise regularly. It can help improve your strength and balance. This can help lower your risk of falling.         Practice fall safety and prevention.   Wear low-heeled shoes that fit well and give your feet good support. Talk to your doctor if you have foot problems that make this hard.  Carry a cellphone or wear a medical alert device that you can use to call for help.  Use stepladders instead of chairs to reach high objects. Don't climb if you're at risk for falls. Ask for help, if needed.  Wear the correct eyeglasses, if you need them.        Make your home safer.   Remove rugs, cords, clutter, and furniture from walkways.  Keep your house well lit. Use night-lights in hallways and bathrooms.  Install and use sturdy handrails on stairways.  Wear nonskid footwear, even inside. Don't walk barefoot or in socks without shoes.        Be safe outside.   Use handrails, curb cuts, and ramps whenever possible.  Keep your hands free by using a shoulder bag or backpack.  Try to walk in well-lit areas. Watch out for uneven ground, changes in pavement, and debris.  Be careful in the winter. Walk on the grass or gravel when sidewalks are slippery. Use de-icer on steps and walkways. Add non-slip devices to shoes.    Put grab bars and nonskid mats in your shower or tub and near the toilet. Try to use a shower chair or bath bench when bathing.   Get into a tub or shower by putting in your weaker leg first. Get out with your strong side first.

## 2025-06-11 NOTE — PROGRESS NOTES
Have you been to the ER, urgent care clinic since your last visit?  Hospitalized since your last visit?   NO    Have you seen or consulted any other health care providers outside our system since your last visit?   Dr. Charles// Ameena// routine check up  Dr. LU[facility]          
      CareTeam (Including outside providers/suppliers regularly involved in providing care):   Patient Care Team:  Milton Hubbard MD as PCP - General (Internal Medicine)  Milton Hubbard MD as PCP - Empaneled Provider     Recommendations for Preventive Services Due: see orders and patient instructions/AVS.  Recommended screening schedule for the next 5-10 years is provided to the patient in written form: see Patient Instructions/AVS.     Reviewed and updated this visit:  Allergies  Meds

## 2025-06-19 ENCOUNTER — TRANSCRIBE ORDERS (OUTPATIENT)
Facility: HOSPITAL | Age: 85
End: 2025-06-19

## 2025-06-19 DIAGNOSIS — M81.0 AGE RELATED OSTEOPOROSIS, UNSPECIFIED PATHOLOGICAL FRACTURE PRESENCE: Primary | ICD-10-CM

## 2025-06-28 DIAGNOSIS — I10 ESSENTIAL HYPERTENSION: ICD-10-CM

## 2025-06-30 RX ORDER — AMLODIPINE BESYLATE 5 MG/1
5 TABLET ORAL DAILY
Qty: 90 TABLET | Refills: 1 | Status: SHIPPED | OUTPATIENT
Start: 2025-06-30

## (undated) DEVICE — PIN EXT FIX SCHNZ 3 MM

## (undated) DEVICE — GLOVE ORTHO 8   MSG9480

## (undated) DEVICE — APPLICATOR MEDICATED 26 CC SOLUTION HI LT ORNG CHLORAPREP

## (undated) DEVICE — DRESSING FOAM POST OPERATIVE 4X10 IN MEPILEX BORDER AG

## (undated) DEVICE — Device: Brand: JELCO

## (undated) DEVICE — PACK SURG PROC KNEE USER GPS

## (undated) DEVICE — SOLUTION IRRIG 3000ML 0.9% SOD CHL USP UROMATIC PLAS CONT

## (undated) DEVICE — TRANSFER SET 3": Brand: MEDLINE INDUSTRIES, INC.

## (undated) DEVICE — SYRINGE 20ML LL S/C 50

## (undated) DEVICE — SUTURE ABSRB L30CM 2-0 VLT SPRL PDS + STRATAFIX SXPP1B410

## (undated) DEVICE — SUTURE VICRYL SZ 1 L36IN ABSRB UD L36MM CT-1 1/2 CIR J947H

## (undated) DEVICE — SUTURE ABSORBABLE MONOFILAMENT 1 CTX 36 CM 48 MM VIO PDS +

## (undated) DEVICE — STRYKER PERFORMANCE SERIES SAGITTAL BLADE: Brand: STRYKER PERFORMANCE SERIES

## (undated) DEVICE — TOTAL JOINT-MRMC: Brand: MEDLINE INDUSTRIES, INC.

## (undated) DEVICE — 60-7070-105 TRNQT,DPSB,PLC BLUE: Brand: MEDLINE RENEWAL

## (undated) DEVICE — SOLUTION IRRIG 1000ML STRL H2O USP PLAS POUR BTL

## (undated) DEVICE — BANDAGE COMPR M W6INXL10YD WHT BGE VELC E MTRX HK AND LOOP

## (undated) DEVICE — GLOVE SURG SZ 8 L12IN FNGR THK79MIL GRN LTX FREE

## (undated) DEVICE — 3M™ IOBAN™ 2 ANTIMICROBIAL INCISE DRAPE 6650EZ: Brand: IOBAN™ 2

## (undated) DEVICE — DRAPE,ORTHOMAX,EXTREMITY: Brand: MEDLINE

## (undated) DEVICE — HANDPIECE SET WITH COAXIAL HIGH FLOW TIP AND SUCTION TUBE: Brand: INTERPULSE

## (undated) DEVICE — PREP KIT PEEL PTCH POVIDONE IOD

## (undated) DEVICE — SUTURE STRATAFIX SZ 3-0 30CM NONABSORB UD 26MM FS 3/8 SXMP2B412

## (undated) DEVICE — LIQUIBAND RAPID ADHESIVE 36/CS 0.8ML: Brand: MEDLINE